# Patient Record
Sex: FEMALE | Race: WHITE | Employment: UNEMPLOYED | URBAN - METROPOLITAN AREA
[De-identification: names, ages, dates, MRNs, and addresses within clinical notes are randomized per-mention and may not be internally consistent; named-entity substitution may affect disease eponyms.]

---

## 2018-12-06 ENCOUNTER — OFFICE VISIT (OUTPATIENT)
Dept: FAMILY MEDICINE CLINIC | Age: 48
End: 2018-12-06

## 2018-12-06 ENCOUNTER — HOSPITAL ENCOUNTER (OUTPATIENT)
Dept: LAB | Age: 48
Discharge: HOME | End: 2018-12-06
Payer: MEDICAID

## 2018-12-06 VITALS
HEART RATE: 100 BPM | WEIGHT: 196.4 LBS | HEIGHT: 66 IN | TEMPERATURE: 97.3 F | DIASTOLIC BLOOD PRESSURE: 81 MMHG | RESPIRATION RATE: 16 BRPM | BODY MASS INDEX: 31.57 KG/M2 | SYSTOLIC BLOOD PRESSURE: 130 MMHG | OXYGEN SATURATION: 96 %

## 2018-12-06 DIAGNOSIS — R20.2 NUMBNESS AND TINGLING OF BOTH LOWER EXTREMITIES: ICD-10-CM

## 2018-12-06 DIAGNOSIS — R20.0 NUMBNESS AND TINGLING OF BOTH LOWER EXTREMITIES: ICD-10-CM

## 2018-12-06 DIAGNOSIS — Z76.89 ENCOUNTER TO ESTABLISH CARE: Primary | ICD-10-CM

## 2018-12-06 DIAGNOSIS — R20.0 NUMBNESS AND TINGLING OF BOTH UPPER EXTREMITIES: ICD-10-CM

## 2018-12-06 DIAGNOSIS — Z72.0 TOBACCO USE: ICD-10-CM

## 2018-12-06 DIAGNOSIS — K21.9 GASTROESOPHAGEAL REFLUX DISEASE, ESOPHAGITIS PRESENCE NOT SPECIFIED: ICD-10-CM

## 2018-12-06 DIAGNOSIS — E03.9 ACQUIRED HYPOTHYROIDISM: ICD-10-CM

## 2018-12-06 DIAGNOSIS — K52.832 LYMPHOCYTIC COLITIS: ICD-10-CM

## 2018-12-06 DIAGNOSIS — Z12.11 COLON CANCER SCREENING: ICD-10-CM

## 2018-12-06 DIAGNOSIS — F32.1 CURRENT MODERATE EPISODE OF MAJOR DEPRESSIVE DISORDER, UNSPECIFIED WHETHER RECURRENT (HCC): ICD-10-CM

## 2018-12-06 DIAGNOSIS — L65.9 HAIR LOSS: ICD-10-CM

## 2018-12-06 DIAGNOSIS — M79.7 FIBROMYALGIA: ICD-10-CM

## 2018-12-06 DIAGNOSIS — E78.5 HYPERLIPIDEMIA, UNSPECIFIED HYPERLIPIDEMIA TYPE: ICD-10-CM

## 2018-12-06 DIAGNOSIS — R20.2 NUMBNESS AND TINGLING OF BOTH UPPER EXTREMITIES: ICD-10-CM

## 2018-12-06 DIAGNOSIS — E66.9 OBESITY (BMI 30-39.9): ICD-10-CM

## 2018-12-06 DIAGNOSIS — Z23 ENCOUNTER FOR IMMUNIZATION: ICD-10-CM

## 2018-12-06 LAB
ALBUMIN SERPL-MCNC: 4.1 G/DL (ref 3.4–5)
ALBUMIN/GLOB SERPL: 1.3 {RATIO} (ref 0.8–1.7)
ALP SERPL-CCNC: 88 U/L (ref 45–117)
ALT SERPL-CCNC: 47 U/L (ref 13–56)
ANION GAP SERPL CALC-SCNC: 5 MMOL/L (ref 3–18)
AST SERPL-CCNC: 23 U/L (ref 15–37)
BASOPHILS # BLD: 0 K/UL (ref 0–0.1)
BASOPHILS NFR BLD: 1 % (ref 0–2)
BILIRUB SERPL-MCNC: 0.2 MG/DL (ref 0.2–1)
BUN SERPL-MCNC: 13 MG/DL (ref 7–18)
BUN/CREAT SERPL: 15 (ref 12–20)
CALCIUM SERPL-MCNC: 8.8 MG/DL (ref 8.5–10.1)
CHLORIDE SERPL-SCNC: 103 MMOL/L (ref 100–108)
CHOLEST SERPL-MCNC: 210 MG/DL
CO2 SERPL-SCNC: 29 MMOL/L (ref 21–32)
CREAT SERPL-MCNC: 0.88 MG/DL (ref 0.6–1.3)
DIFFERENTIAL METHOD BLD: ABNORMAL
EOSINOPHIL # BLD: 0.3 K/UL (ref 0–0.4)
EOSINOPHIL NFR BLD: 4 % (ref 0–5)
ERYTHROCYTE [DISTWIDTH] IN BLOOD BY AUTOMATED COUNT: 13.4 % (ref 11.6–14.5)
EST. AVERAGE GLUCOSE BLD GHB EST-MCNC: 114 MG/DL
GLOBULIN SER CALC-MCNC: 3.2 G/DL (ref 2–4)
GLUCOSE SERPL-MCNC: 101 MG/DL (ref 74–99)
HBA1C MFR BLD: 5.6 % (ref 4.2–5.6)
HCT VFR BLD AUTO: 45.5 % (ref 35–45)
HDLC SERPL-MCNC: 60 MG/DL (ref 40–60)
HDLC SERPL: 3.5 {RATIO} (ref 0–5)
HGB BLD-MCNC: 15 G/DL (ref 12–16)
LDLC SERPL CALC-MCNC: 126.4 MG/DL (ref 0–100)
LIPID PROFILE,FLP: ABNORMAL
LYMPHOCYTES # BLD: 2.5 K/UL (ref 0.9–3.6)
LYMPHOCYTES NFR BLD: 33 % (ref 21–52)
MCH RBC QN AUTO: 32.4 PG (ref 24–34)
MCHC RBC AUTO-ENTMCNC: 33 G/DL (ref 31–37)
MCV RBC AUTO: 98.3 FL (ref 74–97)
MONOCYTES # BLD: 0.3 K/UL (ref 0.05–1.2)
MONOCYTES NFR BLD: 4 % (ref 3–10)
NEUTS SEG # BLD: 4.5 K/UL (ref 1.8–8)
NEUTS SEG NFR BLD: 58 % (ref 40–73)
PLATELET # BLD AUTO: 272 K/UL (ref 135–420)
PMV BLD AUTO: 10.2 FL (ref 9.2–11.8)
POTASSIUM SERPL-SCNC: 4.7 MMOL/L (ref 3.5–5.5)
PROT SERPL-MCNC: 7.3 G/DL (ref 6.4–8.2)
RBC # BLD AUTO: 4.63 M/UL (ref 4.2–5.3)
SODIUM SERPL-SCNC: 137 MMOL/L (ref 136–145)
T4 FREE SERPL-MCNC: 0.8 NG/DL (ref 0.7–1.5)
TRIGL SERPL-MCNC: 118 MG/DL (ref ?–150)
TSH SERPL DL<=0.05 MIU/L-ACNC: 3.5 UIU/ML (ref 0.36–3.74)
VLDLC SERPL CALC-MCNC: 23.6 MG/DL
WBC # BLD AUTO: 7.7 K/UL (ref 4.6–13.2)

## 2018-12-06 PROCEDURE — 80053 COMPREHEN METABOLIC PANEL: CPT

## 2018-12-06 PROCEDURE — 85025 COMPLETE CBC W/AUTO DIFF WBC: CPT

## 2018-12-06 PROCEDURE — 84439 ASSAY OF FREE THYROXINE: CPT

## 2018-12-06 PROCEDURE — 84443 ASSAY THYROID STIM HORMONE: CPT

## 2018-12-06 PROCEDURE — 36415 COLL VENOUS BLD VENIPUNCTURE: CPT

## 2018-12-06 PROCEDURE — 80061 LIPID PANEL: CPT

## 2018-12-06 PROCEDURE — 83036 HEMOGLOBIN GLYCOSYLATED A1C: CPT

## 2018-12-06 RX ORDER — GABAPENTIN 300 MG/1
300 CAPSULE ORAL 3 TIMES DAILY
COMMUNITY
End: 2021-03-02 | Stop reason: DRUGHIGH

## 2018-12-06 RX ORDER — LEVOTHYROXINE SODIUM 112 UG/1
TABLET ORAL
COMMUNITY
End: 2020-05-12 | Stop reason: SDUPTHER

## 2018-12-06 RX ORDER — DULOXETIN HYDROCHLORIDE 60 MG/1
60 CAPSULE, DELAYED RELEASE ORAL DAILY
COMMUNITY

## 2018-12-06 RX ORDER — RANITIDINE 150 MG/1
150 TABLET, FILM COATED ORAL 2 TIMES DAILY
Qty: 180 TAB | Refills: 1 | Status: SHIPPED | OUTPATIENT
Start: 2018-12-06 | End: 2019-10-06 | Stop reason: SDUPTHER

## 2018-12-06 RX ORDER — ALPRAZOLAM 0.5 MG/1
2 TABLET ORAL
COMMUNITY
End: 2021-09-13 | Stop reason: DRUGHIGH

## 2018-12-06 RX ORDER — ARIPIPRAZOLE 10 MG/1
10 TABLET ORAL DAILY
COMMUNITY
End: 2022-04-26 | Stop reason: SINTOL

## 2018-12-06 NOTE — PROGRESS NOTES
HISTORY OF PRESENT ILLNESS  Amarjit Brito is a 46 y.o. female. Establish Care   The history is provided by the patient. Associated symptoms include shortness of breath. Pertinent negatives include no chest pain, no abdominal pain and no headaches. Past Medical History:   Diagnosis Date    Breast cyst     Depression     Emphysema lung ([address]     1 year and a half it was mentioned by ER Doc.  Fibromyalgia     Ovarian cyst        Past Surgical History:   Procedure Laterality Date    BREAST SURGERY PROCEDURE UNLISTED      HX APPENDECTOMY      HX GYN      Cyst, Uterine Ablation       Family History   Problem Relation Age of Onset    Heart Disease Mother     Heart Disease Father     Heart Disease Sister     MS Sister     Heart Disease Brother         3 heart attacks    Cancer Paternal Aunt         breast    Diabetes Neg Hx     Hypertension Neg Hx        Allergies   Allergen Reactions    Bactrim [Sulfamethoprim] Other (comments)     Thrush       Social History     Tobacco Use   Smoking Status Current Every Day Smoker    Packs/day: 0.50   Smokeless Tobacco Never Used     Social History     Substance and Sexual Activity   Alcohol Use Yes    Comment: [facility]     Health Maintenance Review:  Colonoscopy - ~10 years ago ? Lymphocytic colitis due, declines referral for colonoscopy, accepts FIT  Mammogram - within the past year? Pap Smear - over due    Immunizations:  Tetanus - due  Influenza - declines    Patient Active Problem List   Diagnosis Code    Current moderate episode of major depressive disorder (HCC) F32.1    Hyperlipidemia E78.5    Acquired hypothyroidism E03.9    Fibromyalgia M79.7    Obesity (BMI 30-39. 9) E66.9    Tobacco use Z72.0       Current Outpatient Medications:     DULoxetine (CYMBALTA) 60 mg capsule, Take 60 mg by mouth daily. , Disp: , Rfl:     gabapentin (NEURONTIN) 300 mg capsule, Take 300 mg by mouth three (3) times daily. , Disp: , Rfl:     ARIPiprazole (ABILIFY) 10 mg tablet, Take 10 mg by mouth daily. , Disp: , Rfl:     levothyroxine (SYNTHROID) 112 mcg tablet, Take  by mouth Daily (before breakfast). , Disp: , Rfl:     atorvastatin calcium (LIPITOR PO), Take  by mouth., Disp: , Rfl:     ALPRAZolam (XANAX) 0.5 mg tablet, Take  by mouth., Disp: , Rfl:       Review of Systems   Constitutional: Negative for chills, fever and weight loss. HENT: Negative for hearing loss. Eyes: Negative for blurred vision and double vision. Needs glasses   Respiratory: Positive for cough, shortness of breath and wheezing. Cardiovascular: Negative for chest pain, palpitations and leg swelling. Gastrointestinal: Positive for heartburn (quite often - takes TUMs). Negative for abdominal pain, blood in stool, constipation, diarrhea, melena, nausea and vomiting. Genitourinary: Negative for dysuria and urgency. Past uterine ablation   Musculoskeletal: Positive for myalgias (diffuse-fibromyalgia). Negative for joint pain. Skin: Negative for itching and rash. Diffuse hair loss   Neurological: Positive for dizziness (blance issue) and sensory change (ulnar hand numbness bilaterally-numb patch on the back of scalp, also numbness in feet-chronic). Negative for tingling, focal weakness and headaches. Endo/Heme/Allergies: Negative for environmental allergies. Psychiatric/Behavioral: Positive for depression. Negative for suicidal ideas. The patient is nervous/anxious. The patient does not have insomnia. Also reports PTSD, followed by psychiatry     Visit Vitals  /81 (BP 1 Location: Right arm, BP Patient Position: Sitting)   Pulse 100   Temp 97.3 °F (36.3 °C) (Oral)   Resp 16   Ht 5' 5.5\" (1.664 m)   Wt 196 lb 6.4 oz (89.1 kg)   SpO2 96%   BMI 32.19 kg/m²       Physical Exam   Constitutional: She is oriented to person, place, and time. She appears well-developed and well-nourished. HENT:   Head: Normocephalic.    Right Ear: Tympanic membrane and ear canal normal.   Left Ear: Tympanic membrane and ear canal normal.   Mouth/Throat: Oropharynx is clear and moist.   Eyes: Conjunctivae and EOM are normal. Pupils are equal, round, and reactive to light. Neck: Neck supple. Cardiovascular: Normal rate, regular rhythm, normal heart sounds and intact distal pulses. Pulmonary/Chest: Effort normal and breath sounds normal.   Abdominal: Soft. Bowel sounds are normal. She exhibits no mass. There is no tenderness. Musculoskeletal: She exhibits no edema. Neurological: She is alert and oriented to person, place, and time. She has normal reflexes. Skin: Skin is warm and dry. Psychiatric: She has a normal mood and affect. Her behavior is normal.   Nursing note and vitals reviewed. ASSESSMENT and PLAN    ICD-10-CM ICD-9-CM    1. Encounter to establish care Z76.89 V65.8    2. Current moderate episode of major depressive disorder, unspecified whether recurrent (HCC) F32.1 296.22    3. Hyperlipidemia, unspecified hyperlipidemia type E78.5 272.4 LIPID PANEL   4. Acquired hypothyroidism E03.9 244.9 TSH 3RD GENERATION      T4, FREE   5. Fibromyalgia M79.7 729.1    6. Obesity (BMI 30-39. 9) E66.9 278.00    7. Tobacco use Z72.0 305.1    8. Gastroesophageal reflux disease, esophagitis presence not specified K21.9 530.81 raNITIdine (ZANTAC) 150 mg tablet   9. Lymphocytic colitis K52.832 558.9    10. Hair loss L65.9 704.00 CBC WITH AUTOMATED DIFF   11. Numbness and tingling of both lower extremities R20.0 782.0 HEMOGLOBIN A1C WITH EAG    R36.6  METABOLIC PANEL, COMPREHENSIVE   12. Numbness and tingling of both upper extremities R20.0 782.0 HEMOGLOBIN A1C WITH EAG    T81.0  METABOLIC PANEL, COMPREHENSIVE   13. Encounter for immunization Z23 V03.89 TETANUS, DIPHTHERIA TOXOIDS AND ACELLULAR PERTUSSIS VACCINE (TDAP), IN INDIVIDS. >=7, IM   14. Colon cancer screening Z12.11 V76.51 OCCULT BLOOD IMMUNOASSAY,DIAGNOSTIC   Further disposition pending lab results if indicated.   Continue current medications. Take ranitidine (Zantac) 150 mg twice a day before breakfast and at bedtime. Avoid citrus, dairy, caffeine, tomato, alcohol and NSAIDs. Do not eat within 2-3 hours  of bedtime. Schedule well woman evaluation with Dr. Jatin Aragon. The patient was counseled on the dangers of tobacco use, and was advised to quit. Reviewed strategies to maximize success, including written materials. Avoid dietary starch and sugar and follow a program of regular aerobic exercise. Verbal and written recommendations provided. Return for follow up in 6 months, sooner with any problems.

## 2018-12-06 NOTE — PATIENT INSTRUCTIONS
Further disposition pending lab results if indicated. Continue current medications. Take ranitidine (Zantac) 150 mg twice a day before breakfast and at bedtime. Avoid citrus, dairy, caffeine, tomato, alcohol and NSAIDs. Do not eat within 2-3 hours  of bedtime. Schedule well woman evaluation with Dr. Adele Copeland. The patient was counseled on the dangers of tobacco use, and was advised to quit. Reviewed strategies to maximize success, including written materials. Avoid dietary starch and sugar and follow a program of regular aerobic exercise. Verbal and written recommendations provided. Return for follow up in 6 months, sooner with any problems. Well Visit, Women 48 to 72: Care Instructions  Your Care Instructions    Physical exams can help you stay healthy. Your doctor has checked your overall health and may have suggested ways to take good care of yourself. He or she also may have recommended tests. At home, you can help prevent illness with healthy eating, regular exercise, and other steps. Follow-up care is a key part of your treatment and safety. Be sure to make and go to all appointments, and call your doctor if you are having problems. It's also a good idea to know your test results and keep a list of the medicines you take. How can you care for yourself at home? · Reach and stay at a healthy weight. This will lower your risk for many problems, such as obesity, diabetes, heart disease, and high blood pressure. · Get at least 30 minutes of exercise on most days of the week. Walking is a good choice. You also may want to do other activities, such as running, swimming, cycling, or playing tennis or team sports. · Do not smoke. Smoking can make health problems worse. If you need help quitting, talk to your doctor about stop-smoking programs and medicines. These can increase your chances of quitting for good. · Protect your skin from too much sun.  When you're outdoors from 10 a.m. to 4 p.m., stay in the shade or cover up with clothing and a hat with a wide brim. Wear sunglasses that block UV rays. Even when it's cloudy, put broad-spectrum sunscreen (SPF 30 or higher) on any exposed skin. · See a dentist one or two times a year for checkups and to have your teeth cleaned. · Wear a seat belt in the car. · Limit alcohol to 1 drink a day. Too much alcohol can cause health problems. Follow your doctor's advice about when to have certain tests. These tests can spot problems early. · Cholesterol. Your doctor will tell you how often to have this done based on your age, family history, or other things that can increase your risk for heart attack and stroke. · Blood pressure. Have your blood pressure checked during a routine doctor visit. Your doctor will tell you how often to check your blood pressure based on your age, your blood pressure results, and other factors. · Mammogram. Ask your doctor how often you should have a mammogram, which is an X-ray of your breasts. A mammogram can spot breast cancer before it can be felt and when it is easiest to treat. · Pap test and pelvic exam. Ask your doctor how often you should have a Pap test. You may not need to have a Pap test as often as you used to. · Vision. Have your eyes checked every year or two or as often as your doctor suggests. Some experts recommend that you have yearly exams for glaucoma and other age-related eye problems starting at age 48. · Hearing. Tell your doctor if you notice any change in your hearing. You can have tests to find out how well you hear. · Diabetes. Ask your doctor whether you should have tests for diabetes. · Colon cancer. You should begin tests for colon cancer at age 48. You may have one of several tests. Your doctor will tell you how often to have tests based on your age and risk.  Risks include whether you already had a precancerous polyp removed from your colon or whether your parents, sisters and brothers, or children have had colon cancer. · Thyroid disease. Talk to your doctor about whether to have your thyroid checked as part of a regular physical exam. Women have an increased chance of a thyroid problem. · Osteoporosis. You should begin tests for bone density at age 72. If you are younger than 72, ask your doctor whether you have factors that may increase your risk for this disease. You may want to have this test before age 72. · Heart attack and stroke risk. At least every 4 to 6 years, you should have your risk for heart attack and stroke assessed. Your doctor uses factors such as your age, blood pressure, cholesterol, and whether you smoke or have diabetes to show what your risk for a heart attack or stroke is over the next 10 years. When should you call for help? Watch closely for changes in your health, and be sure to contact your doctor if you have any problems or symptoms that concern you. Where can you learn more? Go to http://ricardo-rashida.info/. Enter U022 in the search box to learn more about \"Well Visit, Women 50 to 72: Care Instructions. \"  Current as of: March 29, 2018  Content Version: 11.8  © 5774-3648 [employer]ppy. Care instructions adapted under license by [employer] (which disclaims liability or warranty for this information). If you have questions about a medical condition or this instruction, always ask your healthcare professional. [place] [address] any warranty or liability for your use of this information. Stopping Smoking: Care Instructions  Your Care Instructions  Cigarette smokers crave the nicotine in cigarettes. Giving it up is much harder than simply changing a habit. Your body has to stop craving the nicotine. It is hard to quit, but you can do it. There are many tools that people use to quit smoking. You may find that combining tools works best for you. There are several steps to quitting.  First you get ready to quit. Then you get support to help you. After that, you learn new skills and behaviors to become a nonsmoker. For many people, a necessary step is getting and using medicine. Your doctor will help you set up the plan that best meets your needs. You may want to attend a smoking cessation program to help you quit smoking. When you choose a program, look for one that has proven success. Ask your doctor for ideas. You will greatly increase your chances of success if you take medicine as well as get counseling or join a cessation program.  Some of the changes you feel when you first quit tobacco are uncomfortable. Your body will miss the nicotine at first, and you may feel short-tempered and grumpy. You may have trouble sleeping or concentrating. Medicine can help you deal with these symptoms. You may struggle with changing your smoking habits and rituals. The last step is the tricky one: Be prepared for the smoking urge to continue for a time. This is a lot to deal with, but keep at it. You will feel better. Follow-up care is a key part of your treatment and safety. Be sure to make and go to all appointments, and call your doctor if you are having problems. It's also a good idea to know your test results and keep a list of the medicines you take. How can you care for yourself at home? · Ask your family, friends, and coworkers for support. You have a better chance of quitting if you have help and support. · Join a support group, such as Nicotine Anonymous, for people who are trying to quit smoking. · Consider signing up for a smoking cessation program, such as the American Lung Association's Freedom from Smoking program.  · Get text messaging support. Go to the website at www.smokefree. gov to sign up for the [facility] program.  · Set a quit date. Pick your date carefully so that it is not right in the middle of a big deadline or stressful time. Once you quit, do not even take a puff.  Get rid of all ashtrays and lighters after your last cigarette. Clean your house and your clothes so that they do not smell of smoke. · Learn how to be a nonsmoker. Think about ways you can avoid those things that make you reach for a cigarette. ? Avoid situations that put you at greatest risk for smoking. For some people, it is hard to have a drink with friends without smoking. For others, they might skip a coffee break with coworkers who smoke. ? Change your daily routine. Take a different route to work or eat a meal in a different place. · Cut down on stress. Calm yourself or release tension by doing an activity you enjoy, such as reading a book, taking a hot bath, or gardening. · Talk to your doctor or pharmacist about nicotine replacement therapy, which replaces the nicotine in your body. You still get nicotine but you do not use tobacco. Nicotine replacement products help you slowly reduce the amount of nicotine you need. These products come in several forms, many of them available over-the-counter:  ? Nicotine patches  ? Nicotine gum and lozenges  ? Nicotine inhaler  · Ask your doctor about bupropion (Wellbutrin) or varenicline (Chantix), which are prescription medicines. They do not contain nicotine. They help you by reducing withdrawal symptoms, such as stress and anxiety. · Some people find hypnosis, acupuncture, and massage helpful for ending the smoking habit. · Eat a healthy diet and get regular exercise. Having healthy habits will help your body move past its craving for nicotine. · Be prepared to keep trying. Most people are not successful the first few times they try to quit. Do not get mad at yourself if you smoke again. Make a list of things you learned and think about when you want to try again, such as next week, next month, or next year. Where can you learn more? Go to http://ricardo-rashida.info/. Enter S143 in the search box to learn more about \"Stopping Smoking: Care Instructions. \"  Current as of: November 29, 2017  Content Version: 11.8  © 6253-6598 [employer]. Care instructions adapted under license by [employer] (which disclaims liability or warranty for this information). If you have questions about a medical condition or this instruction, always ask your healthcare professional. [address] any warranty or liability for your use of this information. Starting a Weight Loss Plan: Care Instructions  Your Care Instructions    If you are thinking about losing weight, it can be hard to know where to start. Your doctor can help you set up a weight loss plan that best meets your needs. You may want to take a class on nutrition or exercise, or join a weight loss support group. If you have questions about how to make changes to your eating or exercise habits, ask your doctor about seeing a registered dietitian or an exercise specialist.  It can be a big challenge to lose weight. But you do not have to make huge changes at once. Make small changes, and stick with them. When those changes become habit, add a few more changes. If you do not think you are ready to make changes right now, try to pick a date in the future. Make an appointment to see your doctor to discuss whether the time is right for you to start a plan. Follow-up care is a key part of your treatment and safety. Be sure to make and go to all appointments, and call your doctor if you are having problems. It's also a good idea to know your test results and keep a list of the medicines you take. How can you care for yourself at home? · Set realistic goals. Many people expect to lose much more weight than is likely. A weight loss of 5% to 10% of your body weight may be enough to improve your health. · Get family and friends involved to provide support. Talk to them about why you are trying to lose weight, and ask them to help.  They can help by participating in exercise and having meals with you, even if they may be eating something different. · Find what works best for you. If you do not have time or do not like to cook, a program that offers meal replacement bars or shakes may be better for you. Or if you like to prepare meals, finding a plan that includes daily menus and recipes may be best.  · Ask your doctor about other health professionals who can help you achieve your weight loss goals. ? A dietitian can help you make healthy changes in your diet. ? An exercise specialist or  can help you develop a safe and effective exercise program.  ? A counselor or psychiatrist can help you cope with issues such as depression, anxiety, or family problems that can make it hard to focus on weight loss. · Consider joining a support group for people who are trying to lose weight. Your doctor can suggest groups in your area. Where can you learn more? Go to http://ricardoNudgerashida.info/. Enter Z611 in the search box to learn more about \"Starting a Weight Loss Plan: Care Instructions. \"  Current as of: June 26, 2018  Content Version: 11.8  © 1187-5719 [employer]. Care instructions adapted under license by [employer] (which disclaims liability or warranty for this information). If you have questions about a medical condition or this instruction, always ask your healthcare professional. [address] any warranty or liability for your use of this information. Learning About Low-Carbohydrate Diets for Weight Loss  What is a low-carbohydrate diet? Low-carb diets avoid foods that are high in carbohydrate. These high-carb foods include pasta, bread, rice, cereal, fruits, and starchy vegetables. Instead, these diets usually have you eat foods that are high in fat and protein. Many people lose weight quickly on a low-carb diet. But the early weight loss is water.  People on this diet often gain the weight back after they start eating carbs again. Not all diet plans are safe or work well. A lot of the evidence shows that low-carb diets aren't healthy. That's because these diets often don't include healthy foods like fruits and vegetables. Losing weight safely means balancing protein, fat, and carbs with every meal and snack. And low-carb diets don't always provide the vitamins, minerals, and fiber you need. If you have a serious medical condition, talk to your doctor before you try any diet. These conditions include kidney disease, heart disease, type 2 diabetes, high cholesterol, and high blood pressure. If you are pregnant, it may not be safe for your baby if you are on a low-carb diet. How can you lose weight safely? You might have heard that a diet plan helped another person lose weight. But that doesn't mean that it will work for you. It is very hard to stay on a diet that includes lots of big changes in your eating habits. If you want to get to a healthy weight and stay there, making healthy lifestyle changes will often work better than dieting. These steps can help. · Make a plan for change. Work with your doctor to create a plan that is right for you. · See a dietitian. He or she can show you how to make healthy changes in your eating habits. · Manage stress. If you have a lot of stress in your life, it can be hard to focus on making healthy changes to your daily habits. · Track your food and activity. You are likely to do better at losing weight if you keep track of what you eat and what you do. Follow-up care is a key part of your treatment and safety. Be sure to make and go to all appointments, and call your doctor if you are having problems. It's also a good idea to know your test results and keep a list of the medicines you take. Where can you learn more? Go to http://ricardo-rashida.info/.   Enter [id number] in the search box to learn more about \"Learning About Low-Carbohydrate Diets for Weight Loss.\"  Current as of: March 29, 2018  Content Version: 11.8  © 7535-5395 [employer]. Care instructions adapted under license by [employer] (which disclaims liability or warranty for this information). If you have questions about a medical condition or this instruction, always ask your healthcare professional. [address] any warranty or liability for your use of this information. Gastroesophageal Reflux Disease (GERD): Care Instructions  Your Care Instructions    Gastroesophageal reflux disease (GERD) is the backward flow of stomach acid into the esophagus. The esophagus is the tube that leads from your throat to your stomach. A one-way valve prevents the stomach acid from moving up into this tube. When you have GERD, this valve does not close tightly enough. If you have mild GERD symptoms including heartburn, you may be able to control the problem with antacids or over-the-counter medicine. Changing your diet, losing weight, and making other lifestyle changes can also help reduce symptoms. Follow-up care is a key part of your treatment and safety. Be sure to make and go to all appointments, and call your doctor if you are having problems. It's also a good idea to know your test results and keep a list of the medicines you take. How can you care for yourself at home? · Take your medicines exactly as prescribed. Call your doctor if you think you are having a problem with your medicine. · Your doctor may recommend over-the-counter medicine. For mild or occasional indigestion, antacids, such as Tums, Gaviscon, Mylanta, or Maalox, may help. Your doctor also may recommend over-the-counter acid reducers, such as Pepcid AC, Tagamet HB, Zantac 75, or Prilosec. Read and follow all instructions on the label. If you use these medicines often, talk with your doctor. · Change your eating habits.   ? It's best to eat several small meals instead of two or three large meals.  ? After you eat, wait 2 to 3 hours before you lie down. ? Chocolate, mint, and alcohol can make GERD worse. ? Spicy foods, foods that have a lot of acid (like tomatoes and oranges), and coffee can make GERD symptoms worse in some people. If your symptoms are worse after you eat a certain food, you may want to stop eating that food to see if your symptoms get better. · Do not smoke or chew tobacco. Smoking can make GERD worse. If you need help quitting, talk to your doctor about stop-smoking programs and medicines. These can increase your chances of quitting for good. · If you have GERD symptoms at night, raise the head of your bed 6 to 8 inches by putting the frame on blocks or placing a foam wedge under the head of your mattress. (Adding extra pillows does not work.)  · Do not wear tight clothing around your middle. · Lose weight if you need to. Losing just 5 to 10 pounds can help. When should you call for help? Call your doctor now or seek immediate medical care if:    · You have new or different belly pain.     · Your stools are black and tarlike or have streaks of blood.    Watch closely for changes in your health, and be sure to contact your doctor if:    · Your symptoms have not improved after 2 days.     · Food seems to catch in your throat or chest.   Where can you learn more? Go to http://ricardo-rashida.info/. Enter D275 in the search box to learn more about \"Gastroesophageal Reflux Disease (GERD): Care Instructions. \"  Current as of: March 28, 2018  Content Version: 11.8  © 8907-9320 MovieLaLa. Care instructions adapted under license by [employer] (which disclaims liability or warranty for this information). If you have questions about a medical condition or this instruction, always ask your healthcare professional. [address] any warranty or liability for your use of this information.          Fecal Immunochemical Test (FIT): About This Test  What is it? A fecal immunochemical test, or FIT, checks for hidden blood in the stool. Your doctor gives you a kit that contains everything you need. At home, you follow simple steps to collect a small amount of stool. You return the kit to the doctor or to a lab. Why is this test done? This test is done to check for colorectal cancer and other types of gastrointestinal problems, such as hemorrhoids, anal fissures, and colon polyps, that can cause blood in the stools. How can you prepare for the test?  · Don't do the test during your menstrual period or if you are having bleeding from hemorrhoids. What happens during the test?  There are different types of home tests available. It is important to follow the instructions provided with any test.  Here are some general instructions:  · Check the expiration date on the package. Don't use a test kit after its expiration date. · Follow the instructions exactly. Do all the steps, in order, without skipping any of them. · After you finish your test, follow the instructions that you were given for returning the test.  There is a FIT test that shows the results right away. If your test shows that blood was found in your stool sample, call your doctor as soon as possible. Follow-up care is a key part of your treatment and safety. Be sure to make and go to all appointments, and call your doctor if you are having problems. It's also a good idea to keep a list of the medicines you take. Ask your doctor when you can expect to have your test results. Where can you learn more? Go to http://ricardo-rashida.info/. Enter M950 in the search box to learn more about \"Fecal Immunochemical Test (FIT): About This Test.\"  Current as of: March 28, 2018  Content Version: 11.8  © 9320-9336 [employer]whip. Care instructions adapted under license by [employer] (which disclaims liability or warranty for this information).  If you have questions about a medical condition or this instruction, always ask your healthcare professional. [place] [address] any warranty or liability for your use of this information.

## 2018-12-06 NOTE — PROGRESS NOTES
Matilde Delgado is a 46 y.o. female (: 1970) presenting to address:    Chief Complaint   Patient presents with   [facility].     Here to establish care. pt declined flu vaccine. There were no vitals filed for this visit. Hearing/Vision:   No exam data present    Learning Assessment:     Learning Assessment 2018   PRIMARY LEARNER Patient   HIGHEST LEVEL OF EDUCATION - PRIMARY LEARNER  DID NOT GRADUATE HIGH SCHOOL   BARRIERS PRIMARY LEARNER NONE   CO-LEARNER CAREGIVER No   PRIMARY LANGUAGE ENGLISH   LEARNER PREFERENCE PRIMARY LISTENING   ANSWERED BY patient   RELATIONSHIP SELF     Depression Screening:     PHQ over the last two weeks 2018   Little interest or pleasure in doing things Several days   Feeling down, depressed, irritable, or hopeless Several days   Total Score PHQ 2 2     Fall Risk Assessment:   No flowsheet data found. Abuse Screening:   No flowsheet data found. Coordination of Care Questionaire:   1. Have you been to the ER, urgent care clinic since your last visit? Hospitalized since your last visit? NO    2. Have you seen or consulted any other health care providers outside of the [address] [place] [address] since your last visit? Include any pap smears or colon screening. NO    Advanced Directive:   1. Do you have an Advanced Directive? NO    2. Would you like information on Advanced Directives?  NO

## 2018-12-16 ENCOUNTER — HOSPITAL ENCOUNTER (OUTPATIENT)
Dept: LAB | Age: 48
Discharge: HOME | End: 2018-12-16
Payer: MEDICAID

## 2018-12-16 DIAGNOSIS — Z12.11 COLON CANCER SCREENING: ICD-10-CM

## 2018-12-16 PROCEDURE — 82274 ASSAY TEST FOR BLOOD FECAL: CPT

## 2018-12-28 LAB — HEMOCCULT STL QL IA: NEGATIVE

## 2019-03-27 ENCOUNTER — OFFICE VISIT (OUTPATIENT)
Dept: FAMILY MEDICINE CLINIC | Age: 49
End: 2019-03-27

## 2019-03-27 ENCOUNTER — TELEPHONE (OUTPATIENT)
Dept: FAMILY MEDICINE CLINIC | Age: 49
End: 2019-03-27

## 2019-03-27 VITALS
WEIGHT: 206 LBS | HEART RATE: 95 BPM | DIASTOLIC BLOOD PRESSURE: 73 MMHG | RESPIRATION RATE: 22 BRPM | TEMPERATURE: 96.4 F | HEIGHT: 66 IN | BODY MASS INDEX: 33.11 KG/M2 | SYSTOLIC BLOOD PRESSURE: 132 MMHG | OXYGEN SATURATION: 99 %

## 2019-03-27 DIAGNOSIS — Z86.39 HISTORY OF VITAMIN D DEFICIENCY: Primary | ICD-10-CM

## 2019-03-27 DIAGNOSIS — E66.9 OBESITY (BMI 30-39.9): ICD-10-CM

## 2019-03-27 DIAGNOSIS — Z72.0 TOBACCO USE: ICD-10-CM

## 2019-03-27 DIAGNOSIS — J43.2 CENTRILOBULAR EMPHYSEMA (HCC): Primary | ICD-10-CM

## 2019-03-27 RX ORDER — AMITRIPTYLINE HYDROCHLORIDE 100 MG/1
TABLET, FILM COATED ORAL
COMMUNITY
End: 2020-05-12

## 2019-03-27 RX ORDER — FLUTICASONE PROPIONATE 110 UG/1
2 AEROSOL, METERED RESPIRATORY (INHALATION) EVERY 12 HOURS
Qty: 1 INHALER | Refills: 11 | Status: SHIPPED | OUTPATIENT
Start: 2019-03-27 | End: 2020-05-12 | Stop reason: SDUPTHER

## 2019-03-27 RX ORDER — ALBUTEROL SULFATE 90 UG/1
AEROSOL, METERED RESPIRATORY (INHALATION)
Qty: 1 INHALER | Refills: 11 | Status: SHIPPED | OUTPATIENT
Start: 2019-03-27 | End: 2020-05-12 | Stop reason: SDUPTHER

## 2019-03-27 NOTE — PROGRESS NOTES
Mally Niño is a 46 y.o. female (: 1970) presenting to address:    Chief Complaint   Patient presents with    Weight Gain    Cough     patient would like to discuss emphysema       Vitals:    19 1032   BP: 132/73   Pulse: 95   Resp: 22   Temp: 96.4 °F (35.8 °C)   TempSrc: Oral   SpO2: 99%   Weight: 206 lb (93.4 kg)   Height: 5' 5.5\" (1.664 m)       Hearing/Vision:   No exam data present    Learning Assessment:     Learning Assessment 2018   PRIMARY LEARNER Patient   HIGHEST LEVEL OF EDUCATION - PRIMARY LEARNER  DID NOT GRADUATE HIGH SCHOOL   BARRIERS PRIMARY LEARNER NONE   CO-LEARNER CAREGIVER No   PRIMARY LANGUAGE ENGLISH   LEARNER PREFERENCE PRIMARY LISTENING   ANSWERED BY patient   RELATIONSHIP SELF     Depression Screening:     3 most recent PHQ Screens 2018   Little interest or pleasure in doing things Several days   Feeling down, depressed, irritable, or hopeless Several days   Total Score PHQ 2 2     Fall Risk Assessment:   No flowsheet data found. Abuse Screening:   No flowsheet data found. Coordination of Care Questionaire:   1. Have you been to the ER, urgent care clinic since your last visit? Hospitalized since your last visit? NO    2. Have you seen or consulted any other health care providers outside of the [address] [place] [address] since your last visit? Include any pap smears or colon screening. NO    Advanced Directive:   1. Do you have an Advanced Directive? NO    2. Would you like information on Advanced Directives?  NO

## 2019-03-27 NOTE — TELEPHONE ENCOUNTER
Please advise that  is not here today, can't collect lab, but the order is in for a Vitamin D level which can be drawn anytime she can come by the office.

## 2019-03-27 NOTE — PATIENT INSTRUCTIONS
Albuterol inhaler, 2 puffs up to every 4 hours if needed for wheezing, cough, shortness of breath  Fluticasone inhaler - 2 puffs twice daily rinse mouth and throat after use. Return for follow up in 6 weeks, sooner with any problems. The patient was counseled on the dangers of tobacco use, and was advised to quit. Reviewed strategies to maximize success, including written materials. Avoid dietary starch and sugar and follow a program of regular aerobic exercise. Verbal and written recommendations provided. Chronic Obstructive Pulmonary Disease (COPD): Care Instructions  Your Care Instructions    Chronic obstructive pulmonary disease (COPD) is a general term for a group of lung diseases, including emphysema and chronic bronchitis. People with COPD have decreased airflow in and out of the lungs, which makes it hard to breathe. The airways also can get clogged with thick mucus. Cigarette smoking is a major cause of COPD. Although there is no cure for COPD, you can slow its progress. Following your treatment plan and taking care of yourself can help you feel better and live longer. Follow-up care is a key part of your treatment and safety. Be sure to make and go to all appointments, and call your doctor if you are having problems. It's also a good idea to know your test results and keep a list of the medicines you take. How can you care for yourself at home?   Staying healthy    · Do not smoke. This is the most important step you can take to prevent more damage to your lungs. If you need help quitting, talk to your doctor about stop-smoking programs and medicines. These can increase your chances of quitting for good.     · Avoid colds and flu. Get a pneumococcal vaccine shot. If you have had one before, ask your doctor whether you need a second dose. Get the flu vaccine every fall.  If you must be around people with colds or the flu, wash your hands often.     · Avoid secondhand smoke, air pollution, and high altitudes. Also avoid cold, dry air and hot, humid air. Stay at home with your windows closed when air pollution is bad.    Medicines and oxygen therapy    · Take your medicines exactly as prescribed. Call your doctor if you think you are having a problem with your medicine.     · You may be taking medicines such as:  ? Bronchodilators. These help open your airways and make breathing easier. Bronchodilators are either short-acting (work for 6 to 9 hours) or long-acting (work for 24 hours). You inhale most bronchodilators, so they start to act quickly. Always carry your quick-relief inhaler with you in case you need it while you are away from home. ? Corticosteroids (prednisone, budesonide). These reduce airway inflammation. They come in pill or inhaled form. You must take these medicines every day for them to work well.     · A spacer may help you get more inhaled medicine to your lungs. Ask your doctor or pharmacist if a spacer is right for you. If it is, ask how to use it properly.     · Do not take any vitamins, over-the-counter medicine, or herbal products without talking to your doctor first.     · If your doctor prescribed antibiotics, take them as directed. Do not stop taking them just because you feel better. You need to take the full course of antibiotics.     · Oxygen therapy boosts the amount of oxygen in your blood and helps you breathe easier. Use the flow rate your doctor has recommended, and do not change it without talking to your doctor first.   Activity    · Get regular exercise. Walking is an easy way to get exercise. Start out slowly, and walk a little more each day.     · Pay attention to your breathing.  You are exercising too hard if you cannot talk while you are exercising.     · Take short rest breaks when doing household chores and other activities.     · Learn breathing methods--such as breathing through pursed lips--to help you become less short of breath.     · If your doctor has not set you up with a pulmonary rehabilitation program, talk to him or her about whether rehab is right for you. Rehab includes exercise programs, education about your disease and how to manage it, help with diet and other changes, and emotional support. Diet    · Eat regular, healthy meals. Use bronchodilators about 1 hour before you eat to make it easier to eat. Eat several small meals instead of three large ones. Drink beverages at the end of the meal. Avoid foods that are hard to chew.     · Eat foods that contain protein so that you do not lose muscle mass.     · Talk with your doctor if you gain too much weight or if you lose weight without trying.    Mental health    · Talk to your family, friends, or a therapist about your feelings. It is normal to feel frightened, angry, hopeless, helpless, and even guilty. Talking openly about bad feelings can help you cope. If these feelings last, talk to your doctor. When should you call for help? Call 911 anytime you think you may need emergency care. For example, call if:    · You have severe trouble breathing.    Call your doctor now or seek immediate medical care if:    · You have new or worse trouble breathing.     · You cough up blood.     · You have a fever.    Watch closely for changes in your health, and be sure to contact your doctor if:    · You cough more deeply or more often, especially if you notice more mucus or a change in the color of your mucus.     · You have new or worse swelling in your legs or belly.     · You are not getting better as expected. Where can you learn more? Go to http://ricardo-rashida.info/. Stephanie Jensen in the search box to learn more about \"Chronic Obstructive Pulmonary Disease (COPD): Care Instructions. \"  Current as of: September 5, 2018  Content Version: 11.9  © 6378-1123 [employer].  Care instructions adapted under license by [employer] (which disclaims liability or warranty for this information). If you have questions about a medical condition or this instruction, always ask your healthcare professional. [address] any warranty or liability for your use of this information. Stopping Smoking: Care Instructions  Your Care Instructions  Cigarette smokers crave the nicotine in cigarettes. Giving it up is much harder than simply changing a habit. Your body has to stop craving the nicotine. It is hard to quit, but you can do it. There are many tools that people use to quit smoking. You may find that combining tools works best for you. There are several steps to quitting. First you get ready to quit. Then you get support to help you. After that, you learn new skills and behaviors to become a nonsmoker. For many people, a necessary step is getting and using medicine. Your doctor will help you set up the plan that best meets your needs. You may want to attend a smoking cessation program to help you quit smoking. When you choose a program, look for one that has proven success. Ask your doctor for ideas. You will greatly increase your chances of success if you take medicine as well as get counseling or join a cessation program.  Some of the changes you feel when you first quit tobacco are uncomfortable. Your body will miss the nicotine at first, and you may feel short-tempered and grumpy. You may have trouble sleeping or concentrating. Medicine can help you deal with these symptoms. You may struggle with changing your smoking habits and rituals. The last step is the tricky one: Be prepared for the smoking urge to continue for a time. This is a lot to deal with, but keep at it. You will feel better. Follow-up care is a key part of your treatment and safety. Be sure to make and go to all appointments, and call your doctor if you are having problems. It's also a good idea to know your test results and keep a list of the medicines you take.   How can you care for yourself at home? · Ask your family, friends, and coworkers for support. You have a better chance of quitting if you have help and support. · Join a support group, such as Nicotine Anonymous, for people who are trying to quit smoking. · Consider signing up for a smoking cessation program, such as the American Lung Association's Freedom from Smoking program.  · Get text messaging support. Go to the website at www.smokefree. gov to sign up for the [facility] program.  · Set a quit date. Pick your date carefully so that it is not right in the middle of a big deadline or stressful time. Once you quit, do not even take a puff. Get rid of all ashtrays and lighters after your last cigarette. Clean your house and your clothes so that they do not smell of smoke. · Learn how to be a nonsmoker. Think about ways you can avoid those things that make you reach for a cigarette. ? Avoid situations that put you at greatest risk for smoking. For some people, it is hard to have a drink with friends without smoking. For others, they might skip a coffee break with coworkers who smoke. ? Change your daily routine. Take a different route to work or eat a meal in a different place. · Cut down on stress. Calm yourself or release tension by doing an activity you enjoy, such as reading a book, taking a hot bath, or gardening. · Talk to your doctor or pharmacist about nicotine replacement therapy, which replaces the nicotine in your body. You still get nicotine but you do not use tobacco. Nicotine replacement products help you slowly reduce the amount of nicotine you need. These products come in several forms, many of them available over-the-counter:  ? Nicotine patches  ? Nicotine gum and lozenges  ? Nicotine inhaler  · Ask your doctor about bupropion (Wellbutrin) or varenicline (Chantix), which are prescription medicines. They do not contain nicotine. They help you by reducing withdrawal symptoms, such as stress and anxiety.   · Some people find hypnosis, acupuncture, and massage helpful for ending the smoking habit. · Eat a healthy diet and get regular exercise. Having healthy habits will help your body move past its craving for nicotine. · Be prepared to keep trying. Most people are not successful the first few times they try to quit. Do not get mad at yourself if you smoke again. Make a list of things you learned and think about when you want to try again, such as next week, next month, or next year. Where can you learn more? Go to http://ricardo-rashida.info/. Enter M594 in the search box to learn more about \"Stopping Smoking: Care Instructions. \"  Current as of: September 26, 2018  Content Version: 11.9  © 3838-4640 [employer]. Care instructions adapted under license by [employer] (which disclaims liability or warranty for this information). If you have questions about a medical condition or this instruction, always ask your healthcare professional. [place] [address] any warranty or liability for your use of this information. Starting a Weight Loss Plan: Care Instructions  Your Care Instructions    If you are thinking about losing weight, it can be hard to know where to start. Your doctor can help you set up a weight loss plan that best meets your needs. You may want to take a class on nutrition or exercise, or join a weight loss support group. If you have questions about how to make changes to your eating or exercise habits, ask your doctor about seeing a registered dietitian or an exercise specialist.  It can be a big challenge to lose weight. But you do not have to make huge changes at once. Make small changes, and stick with them. When those changes become habit, add a few more changes. If you do not think you are ready to make changes right now, try to pick a date in the future.  Make an appointment to see your doctor to discuss whether the time is right for you to start a plan. Follow-up care is a key part of your treatment and safety. Be sure to make and go to all appointments, and call your doctor if you are having problems. It's also a good idea to know your test results and keep a list of the medicines you take. How can you care for yourself at home? · Set realistic goals. Many people expect to lose much more weight than is likely. A weight loss of 5% to 10% of your body weight may be enough to improve your health. · Get family and friends involved to provide support. Talk to them about why you are trying to lose weight, and ask them to help. They can help by participating in exercise and having meals with you, even if they may be eating something different. · Find what works best for you. If you do not have time or do not like to cook, a program that offers meal replacement bars or shakes may be better for you. Or if you like to prepare meals, finding a plan that includes daily menus and recipes may be best.  · Ask your doctor about other health professionals who can help you achieve your weight loss goals. ? A dietitian can help you make healthy changes in your diet. ? An exercise specialist or  can help you develop a safe and effective exercise program.  ? A counselor or psychiatrist can help you cope with issues such as depression, anxiety, or family problems that can make it hard to focus on weight loss. · Consider joining a support group for people who are trying to lose weight. Your doctor can suggest groups in your area. Where can you learn more? Go to http://ricardo-rashida.info/. Enter E430 in the search box to learn more about \"Starting a Weight Loss Plan: Care Instructions. \"  Current as of: June 25, 2018  Content Version: 11.9  © 0307-1465 [employer]. Care instructions adapted under license by [employer] (which disclaims liability or warranty for this information).  If you have questions about a medical condition or this instruction, always ask your healthcare professional. [address] any warranty or liability for your use of this information. Learning About the Glycemic Index  What is the glycemic index? The glycemic index (GI) is a rating system for foods that contain carbohydrate. It helps you know how quickly these foods raise blood sugar. Carbohydrate raises blood sugar more quickly than other nutrients, like proteins and fats. Some carbohydrate foods raise blood sugar faster than others. · Low glycemic foods release sugar into the blood slowly. · High glycemic foods make blood sugar rise quickly. How does it work? Foods in the index are ranked by number. · High glycemic index foods are rated 70 and above. · Medium glycemic index foods are 56 to 69. · Low glycemic index foods are 55 or less. What do you need to know? Some people who have diabetes use the glycemic index to help them plan meals and manage blood sugar. · If you have diabetes, talk with your doctor, a dietitian, or a certified diabetes educator before using a low glycemic index eating plan. · Eating low glycemic foods is most helpful when used along with another eating plan for diabetes, such as carbohydrate counting. Counting carbs helps you know how much carbohydrate you're eating. The amount of carbohydrate you eat is more important than the glycemic index of foods in helping you control your blood sugar. · The rating of a food can change depending on ripeness, how it is prepared (juiced, mashed, ground), how it is cooked, and how long it is stored. · People respond differently to the glycemic content of foods. Many things affect the glycemic index. The only way to know for sure how a food affects your blood sugar is to check your blood sugar before and after you eat that food. · High GI foods are rarely eaten on their own.  This means that the glycemic index might not be helpful unless you're eating a food by itself. Eating foods together can change their rating. What are examples of foods in the glycemic index? · High glycemic index foods include:  ? Watermelon. ? Baked potatoes, such as a russet. ? Pumpkin. ? Instant oatmeal.  ? White bread. · Medium glycemic index foods include:  ? Hamburger buns (white). ? Brown rice. · Low glycemic index foods include:  ? Apples. ? Sweet potatoes. ? Whole-grain bread. ? Whole wheat spaghetti. ? Dried beans and lentils. Where can you learn more? Go to http://ricardo-rashida.info/. Enter B941 in the search box to learn more about \"Learning About the Glycemic Index. \"  Current as of: July 25, 2018  Content Version: 11.9  © 1141-4507 [employer]. Care instructions adapted under license by [employer] (which disclaims liability or warranty for this information). If you have questions about a medical condition or this instruction, always ask your healthcare professional. [place] [address] any warranty or liability for your use of this information. Counting Carbohydrates: Care Instructions  Your Care Instructions    You don't have to eat special foods when you have diabetes. You just have to be careful to eat healthy foods. Carbohydrates (carbs) raise blood sugar higher and quicker than any other nutrient. Carbs are found in desserts, breads and cereals, and fruit. They're also in starchy vegetables. These include potatoes, corn, and grains such as rice and pasta. Carbs are also in milk and yogurt. The more carbs you eat at one time, the higher your blood sugar will rise. Spreading carbs all through the day helps keep your blood sugar levels within your target range. Counting carbs is one of the best ways to keep your blood sugar under control.   If you use insulin, counting carbs helps you match the right amount of insulin to the number of grams of carbs in a meal. Then you can change your diet and insulin dose as needed. Testing your blood sugar several times a day can help you learn how carbs affect your blood sugar. A registered dietitian or certified diabetes educator can help you plan meals and snacks. Follow-up care is a key part of your treatment and safety. Be sure to make and go to all appointments, and call your doctor if you are having problems. It's also a good idea to know your test results and keep a list of the medicines you take. How can you care for yourself at home? Know your daily amount of carbohydrates  Your daily amount depends on several things, such as your weight, how active you are, which diabetes medicines you take, and what your goals are for your blood sugar levels. A registered dietitian or certified diabetes educator can help you plan how many carbs to include in each meal and snack. For most adults, a guideline for the daily amount of carbs is:  · 45 to 60 grams at each meal. That's about the same as 3 to 4 carbohydrate servings. · 15 to 20 grams at each snack. That's about the same as 1 carbohydrate serving. Count carbs  Counting carbs lets you know how much rapid-acting insulin to take before you eat. If you use an insulin pump, you get a constant rate of insulin during the day. So the pump must be programmed at meals. This gives you extra insulin to cover the rise in blood sugar after meals. If you take insulin:  · Learn your own insulin-to-carb ratio. You and your diabetes health professional will figure out the ratio. You can do this by testing your blood sugar after meals. For example, you may need a certain amount of insulin for every 15 grams of carbs. · Add up the carb grams in a meal. Then you can figure out how many units of insulin to take based on your insulin-to-carb ratio. · Exercise lowers blood sugar. You can use less insulin than you would if you were not doing exercise. Keep in mind that timing matters.  If you exercise within 1 hour after a meal, your body may need less insulin for that meal than it would if you exercised 3 hours after the meal. Test your blood sugar to find out how exercise affects your need for insulin. If you do or don't take insulin:  · Look at labels on packaged foods. This can tell you how many carbs are in a serving. You can also use guides from the American Diabetes Association. · Be aware of portions, or serving sizes. If a package has two servings and you eat the whole package, you need to double the number of grams of carbohydrate listed for one serving. · Protein, fat, and fiber do not raise blood sugar as much as carbs do. If you eat a lot of these nutrients in a meal, your blood sugar will rise more slowly than it would otherwise. Eat from all food groups  · Eat at least three meals a day. · Plan meals to include food from all the food groups. The food groups include grains, fruits, dairy, proteins, and vegetables. · Talk to your dietitian or diabetes educator about ways to add limited amounts of sweets into your meal plan. · If you drink alcohol, talk to your doctor. It may not be recommended when you are taking certain diabetes medicines. Where can you learn more? Go to http://ricardo-rashida.info/. Enter S420 in the search box to learn more about \"Counting Carbohydrates: Care Instructions. \"  Current as of: July 25, 2018  Content Version: 11.9  © 6984-3552 [employer]. Care instructions adapted under license by [employer] (which disclaims liability or warranty for this information). If you have questions about a medical condition or this instruction, always ask your healthcare professional. [place] [address] any warranty or liability for your use of this information.

## 2019-03-27 NOTE — PROGRESS NOTES
HISTORY OF PRESENT ILLNESS  Ramone Perez is a 46 y.o. female. Cough   The history is provided by the patient and medical records. Pertinent negatives include no chest pain. Patient Active Problem List   Diagnosis Code    Current moderate episode of major depressive disorder (HCC) F32.1    Hyperlipidemia E78.5    Acquired hypothyroidism E03.9    Fibromyalgia M79.7    Obesity (BMI 30-39. 9) E66.9    Tobacco use Z72.0    Gastroesophageal reflux disease K21.9    Lymphocytic colitis K52.832       Current Outpatient Medications:     amitriptyline (ELAVIL) 100 mg tablet, Take  by mouth nightly., Disp: , Rfl:     DULoxetine (CYMBALTA) 60 mg capsule, Take 60 mg by mouth daily. , Disp: , Rfl:     gabapentin (NEURONTIN) 300 mg capsule, Take 300 mg by mouth three (3) times daily. , Disp: , Rfl:     ARIPiprazole (ABILIFY) 10 mg tablet, Take 10 mg by mouth daily. , Disp: , Rfl:     levothyroxine (SYNTHROID) 112 mcg tablet, Take  by mouth Daily (before breakfast). , Disp: , Rfl:     atorvastatin calcium (LIPITOR PO), Take  by mouth., Disp: , Rfl:     ALPRAZolam (XANAX) 0.5 mg tablet, Take  by mouth., Disp: , Rfl:     raNITIdine (ZANTAC) 150 mg tablet, Take 1 Tab by mouth two (2) times a day., Disp: 180 Tab, Rfl: 1    ibuprofen (MOTRIN) 600 mg tablet, Take 1 Tab by mouth every six (6) hours as needed for Pain., Disp: 20 Tab, Rfl: 0    Allergies   Allergen Reactions    Bactrim [Sulfamethoprim] Unknown (comments)    Bactrim [Sulfamethoprim] Other (comments)     Thrush         Review of Systems   Constitutional: Negative for chills, fever and weight loss. Inability to lose weight   Respiratory: Positive for cough, sputum production (a few months) and wheezing. Cardiovascular: Negative for chest pain and palpitations.      Visit Vitals  /73 (BP 1 Location: Left arm, BP Patient Position: Sitting)   Pulse 95   Temp 96.4 °F (35.8 °C) (Oral)   Resp 22   Ht 5' 5.5\" (1.664 m)   Wt 206 lb (93.4 kg)   SpO2 99%   BMI 33.76 kg/m²       Physical Exam   Constitutional: She is oriented to person, place, and time. She appears well-developed and well-nourished. HENT:   Head: Normocephalic. Eyes: Conjunctivae and EOM are normal.   Neck: Neck supple. Cardiovascular: Normal rate, regular rhythm and normal heart sounds. Pulmonary/Chest: Effort normal and breath sounds normal.   Musculoskeletal: She exhibits no edema. Neurological: She is alert and oriented to person, place, and time. Skin: Skin is warm and dry. Psychiatric: She has a normal mood and affect. Her behavior is normal.   Nursing note and vitals reviewed. Chest CT from 2017 showed central lobar emphysema  ASSESSMENT and PLAN    ICD-10-CM ICD-9-CM    1. Centrilobular emphysema (HCC) J43.2 492.8 albuterol (PROVENTIL HFA, VENTOLIN HFA, PROAIR HFA) 90 mcg/actuation inhaler      fluticasone propionate (FLOVENT HFA) 110 mcg/actuation inhaler   2. Tobacco use Z72.0 305.1    3. Obesity (BMI 30-39. 9) E66.9 278.00    Albuterol inhaler, 2 puffs up to every 4 hours if needed for wheezing, cough, shortness of breath  Fluticasone inhaler - 2 puffs twice daily rinse mouth and throat after use. Return for follow up in 6 weeks, sooner with any problems. The patient was counseled on the dangers of tobacco use, and was advised to quit. Reviewed strategies to maximize success, including written materials. Avoid dietary starch and sugar and follow a program of regular aerobic exercise. Verbal and written recommendations provided.

## 2019-03-28 NOTE — TELEPHONE ENCOUNTER
Called and left message for patient that her orders are in and she can just stop by the office to get labs drawn.

## 2019-03-29 ENCOUNTER — HOSPITAL ENCOUNTER (OUTPATIENT)
Dept: LAB | Age: 49
Discharge: HOME | End: 2019-03-29
Payer: COMMERCIAL

## 2019-03-29 ENCOUNTER — TELEPHONE (OUTPATIENT)
Dept: FAMILY MEDICINE CLINIC | Age: 49
End: 2019-03-29

## 2019-03-29 DIAGNOSIS — Z86.39 HISTORY OF VITAMIN D DEFICIENCY: ICD-10-CM

## 2019-03-29 PROCEDURE — 36415 COLL VENOUS BLD VENIPUNCTURE: CPT

## 2019-03-29 PROCEDURE — 82306 VITAMIN D 25 HYDROXY: CPT

## 2019-03-29 NOTE — TELEPHONE ENCOUNTER
Pt called to request a transfer of care from Dr. Phoebe Everett to Jaswinder Tamayo. It states that she originally requested  and she was unavailable but states that she will be much happier with Jaswinder Tamayo and she heard that she deals with fibromyalgia and pt has that so she would like to see her for that too.  Pt call back [phone]

## 2019-03-29 NOTE — TELEPHONE ENCOUNTER
First of all, I am not a fibromyalgia specialist.  Also, I am not in agreement with patients switching among doctors within the same practice. Please give her some alternate practices in the area which she can call to establish herself with a new PCP.

## 2019-03-30 DIAGNOSIS — E55.9 VITAMIN D DEFICIENCY: Primary | ICD-10-CM

## 2019-03-30 LAB — 25(OH)D3 SERPL-MCNC: 17.1 NG/ML (ref 30–100)

## 2019-03-30 RX ORDER — ERGOCALCIFEROL 1.25 MG/1
50000 CAPSULE ORAL
Qty: 9 CAP | Refills: 0 | Status: SHIPPED | OUTPATIENT
Start: 2019-03-30 | End: 2019-07-24 | Stop reason: SDUPTHER

## 2019-03-30 NOTE — PROGRESS NOTES
The Vitamin D level was quite low. I recommend Vitamin D2, (ergocalciferol) 50,000 international units weekly, to be continued indefinitely pending future Vitamin D levels. The prescription(s) has been sent to the patient's pharmacy. I also recommend starting OTC Vitamin D3, 2,000 international units daily. A vitamin D level should be checked again in 6 months. I have been advised that she has chosen not to continue with me as her primary care provider so follow-up and refills will need to be with her new PCP.

## 2019-04-02 ENCOUNTER — TELEPHONE (OUTPATIENT)
Dept: FAMILY MEDICINE CLINIC | Age: 49
End: 2019-04-02

## 2019-04-02 NOTE — TELEPHONE ENCOUNTER
Pt called to switch providers from [facility] to [facility], told her Dr. Suzie Mo answer and now pt is wanting to switch to [facility] [place] ([facility]) or [facility]. \" Told pt that Dr. Farzaneh Acevedo is definitely not taking on new cases and that since Dr. Joon Noble has put a note on her chart, then the other provides will be able to see that too and it is not recommended to hop from one provider to another just because of dissatisfaction. Pt claims that it is more than that and Dr. Nisreen Mcneill is very dismissive with her medical needs and that he \"seemed like he just didn't care. \" Pt states other medical needs that she feel like Dr. Nisreen Mcneill has not addressed. Gave her the option of receiving care at [facility] and pt just asked for Dr. Honorio Lu. Told her that all I could do is ask and we will get back to her with an answer.  [phone]

## 2019-04-03 NOTE — TELEPHONE ENCOUNTER
No, recommend providers at [facility], [facility]..

## 2019-05-08 ENCOUNTER — HOSPITAL ENCOUNTER (OUTPATIENT)
Dept: LAB | Age: 49
Discharge: HOME | End: 2019-05-08
Payer: MEDICAID

## 2019-05-08 ENCOUNTER — OFFICE VISIT (OUTPATIENT)
Dept: FAMILY MEDICINE CLINIC | Age: 49
End: 2019-05-08

## 2019-05-08 VITALS
TEMPERATURE: 98.4 F | SYSTOLIC BLOOD PRESSURE: 136 MMHG | WEIGHT: 207.6 LBS | RESPIRATION RATE: 16 BRPM | BODY MASS INDEX: 33.37 KG/M2 | HEART RATE: 97 BPM | OXYGEN SATURATION: 98 % | HEIGHT: 66 IN | DIASTOLIC BLOOD PRESSURE: 86 MMHG

## 2019-05-08 DIAGNOSIS — E66.9 OBESITY (BMI 30-39.9): ICD-10-CM

## 2019-05-08 DIAGNOSIS — Z72.0 TOBACCO USE: ICD-10-CM

## 2019-05-08 DIAGNOSIS — L60.9 FINGERNAIL PROBLEM: ICD-10-CM

## 2019-05-08 DIAGNOSIS — R60.0 BILATERAL LOWER EXTREMITY EDEMA: ICD-10-CM

## 2019-05-08 DIAGNOSIS — E03.9 ACQUIRED HYPOTHYROIDISM: ICD-10-CM

## 2019-05-08 DIAGNOSIS — R53.82 CHRONIC FATIGUE: ICD-10-CM

## 2019-05-08 DIAGNOSIS — K21.9 GASTROESOPHAGEAL REFLUX DISEASE, ESOPHAGITIS PRESENCE NOT SPECIFIED: ICD-10-CM

## 2019-05-08 DIAGNOSIS — F32.1 CURRENT MODERATE EPISODE OF MAJOR DEPRESSIVE DISORDER, UNSPECIFIED WHETHER RECURRENT (HCC): ICD-10-CM

## 2019-05-08 DIAGNOSIS — E55.9 VITAMIN D DEFICIENCY: ICD-10-CM

## 2019-05-08 DIAGNOSIS — J43.2 CENTRILOBULAR EMPHYSEMA (HCC): ICD-10-CM

## 2019-05-08 DIAGNOSIS — M79.7 FIBROMYALGIA: Primary | ICD-10-CM

## 2019-05-08 DIAGNOSIS — E78.5 HYPERLIPIDEMIA, UNSPECIFIED HYPERLIPIDEMIA TYPE: ICD-10-CM

## 2019-05-08 LAB
ALBUMIN SERPL-MCNC: 4 G/DL (ref 3.4–5)
ALBUMIN/GLOB SERPL: 1.3 {RATIO} (ref 0.8–1.7)
ALP SERPL-CCNC: 98 U/L (ref 45–117)
ALT SERPL-CCNC: 45 U/L (ref 13–56)
ANION GAP SERPL CALC-SCNC: 9 MMOL/L (ref 3–18)
AST SERPL-CCNC: 34 U/L (ref 15–37)
BASOPHILS # BLD: 0 K/UL (ref 0–0.1)
BASOPHILS NFR BLD: 0 % (ref 0–2)
BILIRUB SERPL-MCNC: 0.5 MG/DL (ref 0.2–1)
BUN SERPL-MCNC: 11 MG/DL (ref 7–18)
BUN/CREAT SERPL: 12 (ref 12–20)
CALCIUM SERPL-MCNC: 9.1 MG/DL (ref 8.5–10.1)
CHLORIDE SERPL-SCNC: 101 MMOL/L (ref 100–108)
CHOLEST SERPL-MCNC: 200 MG/DL
CO2 SERPL-SCNC: 27 MMOL/L (ref 21–32)
CREAT SERPL-MCNC: 0.95 MG/DL (ref 0.6–1.3)
DIFFERENTIAL METHOD BLD: NORMAL
EOSINOPHIL # BLD: 0.3 K/UL (ref 0–0.4)
EOSINOPHIL NFR BLD: 4 % (ref 0–5)
ERYTHROCYTE [DISTWIDTH] IN BLOOD BY AUTOMATED COUNT: 12.9 % (ref 11.6–14.5)
GLOBULIN SER CALC-MCNC: 3.1 G/DL (ref 2–4)
GLUCOSE SERPL-MCNC: 94 MG/DL (ref 74–99)
HCT VFR BLD AUTO: 43.8 % (ref 35–45)
HDLC SERPL-MCNC: 44 MG/DL (ref 40–60)
HDLC SERPL: 4.5 {RATIO} (ref 0–5)
HGB BLD-MCNC: 14.2 G/DL (ref 12–16)
LDLC SERPL CALC-MCNC: 112.6 MG/DL (ref 0–100)
LIPID PROFILE,FLP: ABNORMAL
LYMPHOCYTES # BLD: 2.6 K/UL (ref 0.9–3.6)
LYMPHOCYTES NFR BLD: 35 % (ref 21–52)
MCH RBC QN AUTO: 31.1 PG (ref 24–34)
MCHC RBC AUTO-ENTMCNC: 32.4 G/DL (ref 31–37)
MCV RBC AUTO: 96.1 FL (ref 74–97)
MONOCYTES # BLD: 0.4 K/UL (ref 0.05–1.2)
MONOCYTES NFR BLD: 5 % (ref 3–10)
NEUTS SEG # BLD: 4.2 K/UL (ref 1.8–8)
NEUTS SEG NFR BLD: 56 % (ref 40–73)
PLATELET # BLD AUTO: 246 K/UL (ref 135–420)
PMV BLD AUTO: 10.4 FL (ref 9.2–11.8)
POTASSIUM SERPL-SCNC: 4.3 MMOL/L (ref 3.5–5.5)
PROT SERPL-MCNC: 7.1 G/DL (ref 6.4–8.2)
RBC # BLD AUTO: 4.56 M/UL (ref 4.2–5.3)
SODIUM SERPL-SCNC: 137 MMOL/L (ref 136–145)
T4 FREE SERPL-MCNC: 0.9 NG/DL (ref 0.7–1.5)
TRIGL SERPL-MCNC: 217 MG/DL (ref ?–150)
TSH SERPL DL<=0.05 MIU/L-ACNC: 1.69 UIU/ML (ref 0.36–3.74)
VLDLC SERPL CALC-MCNC: 43.4 MG/DL
WBC # BLD AUTO: 7.4 K/UL (ref 4.6–13.2)

## 2019-05-08 PROCEDURE — 36415 COLL VENOUS BLD VENIPUNCTURE: CPT

## 2019-05-08 PROCEDURE — 80061 LIPID PANEL: CPT

## 2019-05-08 PROCEDURE — 82306 VITAMIN D 25 HYDROXY: CPT

## 2019-05-08 PROCEDURE — 84443 ASSAY THYROID STIM HORMONE: CPT

## 2019-05-08 PROCEDURE — 80053 COMPREHEN METABOLIC PANEL: CPT

## 2019-05-08 PROCEDURE — 84439 ASSAY OF FREE THYROXINE: CPT

## 2019-05-08 PROCEDURE — 85025 COMPLETE CBC W/AUTO DIFF WBC: CPT

## 2019-05-08 RX ORDER — METHYLPREDNISOLONE 4 MG/1
TABLET ORAL
Qty: 1 DOSE PACK | Refills: 1 | Status: SHIPPED | OUTPATIENT
Start: 2019-05-08 | End: 2019-06-10 | Stop reason: ALTCHOICE

## 2019-05-08 NOTE — PROGRESS NOTES
Karthik Henderson is a 46 y.o. female (: 1970) presenting to address: Chief Complaint Patient presents with  Vitamin D Deficiency f/u  Weight Gain Vitals:  
 19 1305 BP: 136/86 Pulse: 97 Resp: 16 Temp: 98.4 °F (36.9 °C) TempSrc: Oral  
SpO2: 98% Weight: 207 lb 9.6 oz (94.2 kg) Height: 5' 5.5\" (1.664 m) PainSc:   6 PainLoc: Generalized Hearing/Vision: No exam data present Learning Assessment:  
 
Learning Assessment 2018 PRIMARY LEARNER Patient HIGHEST LEVEL OF EDUCATION - PRIMARY LEARNER  DID NOT GRADUATE HIGH SCHOOL  
BARRIERS PRIMARY LEARNER NONE  
CO-LEARNER CAREGIVER No  
PRIMARY LANGUAGE ENGLISH  
LEARNER PREFERENCE PRIMARY LISTENING  
ANSWERED BY patient RELATIONSHIP SELF Depression Screening:  
 
3 most recent PHQ Screens 2018 Little interest or pleasure in doing things Several days Feeling down, depressed, irritable, or hopeless Several days Total Score PHQ 2 2 Fall Risk Assessment:  
 
Fall Risk Assessment, last 12 mths 2019 Able to walk? Yes Fall in past 12 months? No  
 
Abuse Screening:  
 
Abuse Screening Questionnaire 2019 Do you ever feel afraid of your partner? Danyell Ores Are you in a relationship with someone who physically or mentally threatens you? Danyell Ores Is it safe for you to go home? Zeeshan Guardian Coordination of Care Questionaire: 1. Have you been to the ER, urgent care clinic since your last visit? Hospitalized since your last visit? NO 
 
2. Have you seen or consulted any other health care providers outside of the [address] [place] [address] since your last visit? Include any pap smears or colon screening. NO Advanced Directive: 1. Do you have an Advanced Directive? NO 
 
2. Would you like information on Advanced Directives?  NO

## 2019-05-08 NOTE — PATIENT INSTRUCTIONS
Further disposition pending lab results if indicated. Elevate the legs above horizontal as often as possible. Avoid salt and prolonged sitting and standing. Consider support hose or compression stockings. Avoid dietary starch sugar and salt and is much as possible follow a program of regular aerobic exercise. Medrol Dosepak as per Dosepak instructions, may repeat Dosepak x1 if needed. Dermatology referral 
  
Leg and Ankle Edema: Care Instructions Your Care Instructions Swelling in the legs, ankles, and feet is called edema. It is common after you sit or stand for a while. Long plane flights or car rides often cause swelling in the legs and feet. You may also have swelling if you have to stand for long periods of time at your job. Problems with the veins in the legs (varicose veins) and changes in hormones can also cause swelling. Sometimes the swelling in the ankles and feet is caused by a more serious problem, such as heart failure, infection, blood clots, or liver or kidney disease. Follow-up care is a key part of your treatment and safety. Be sure to make and go to all appointments, and call your doctor if you are having problems. It's also a good idea to know your test results and keep a list of the medicines you take. How can you care for yourself at home? · If your doctor gave you medicine, take it as prescribed. Call your doctor if you think you are having a problem with your medicine. · Whenever you are resting, raise your legs up. Try to keep the swollen area higher than the level of your heart. · Take breaks from standing or sitting in one position. ? Walk around to increase the blood flow in your lower legs. ? Move your feet and ankles often while you stand, or tighten and relax your leg muscles. · Wear support stockings. Put them on in the morning, before swelling gets worse. · Eat a balanced diet. Lose weight if you need to. · Limit the amount of salt (sodium) in your diet. Salt holds fluid in the body and may increase swelling. When should you call for help? Call 911 anytime you think you may need emergency care. For example, call if: 
  · You have symptoms of a blood clot in your lung (called a pulmonary embolism). These may include: 
? Sudden chest pain. ? Trouble breathing. ? Coughing up blood.  
 Call your doctor now or seek immediate medical care if: 
  · You have signs of a blood clot, such as: 
? Pain in your calf, back of the knee, thigh, or groin. ? Redness and swelling in your leg or groin.  
  · You have symptoms of infection, such as: 
? Increased pain, swelling, warmth, or redness. ? Red streaks or pus. ? A fever.  
 Watch closely for changes in your health, and be sure to contact your doctor if: 
  · Your swelling is getting worse.  
  · You have new or worsening pain in your legs.  
  · You do not get better as expected. Where can you learn more? Go to http://ricardo-rashida.info/. Enter E600 in the search box to learn more about \"Leg and Ankle Edema: Care Instructions. \" Current as of: September 23, 2018 Content Version: 11.9 © 4008-5233 [employer]. Care instructions adapted under license by [employer] (which disclaims liability or warranty for this information). If you have questions about a medical condition or this instruction, always ask your healthcare professional. [place] [address] any warranty or liability for your use of this information.

## 2019-05-08 NOTE — PROGRESS NOTES
HISTORY OF PRESENT ILLNESS Deepika Olson is a 46 y.o. female. Follow Up Chronic Condition The history is provided by the patient and medical records. Pertinent negatives include no chest pain, no abdominal pain and no shortness of breath. Mr#: [id number] Patient Active Problem List  
Diagnosis Code  Current moderate episode of major depressive disorder (HCC) F32.1  Hyperlipidemia E78.5  Acquired hypothyroidism E03.9  Fibromyalgia M79.7  Obesity (BMI 30-39. 9) E66.9  Tobacco use Z72.0  Gastroesophageal reflux disease K21.9  Lymphocytic colitis K52.832  Vitamin D deficiency E55.9 Current Outpatient Medications:  
  ergocalciferol (ERGOCALCIFEROL) 50,000 unit capsule, Take 1 Cap by mouth every seven (7) days. , Disp: 9 Cap, Rfl: 0 
  amitriptyline (ELAVIL) 100 mg tablet, Take  by mouth nightly., Disp: , Rfl:  
  albuterol (PROVENTIL HFA, VENTOLIN HFA, PROAIR HFA) 90 mcg/actuation inhaler, Albuterol inhaler, 2 puffs up to every 4 hours if needed for wheezing, cough, shortness of breath, Disp: 1 Inhaler, Rfl: 11 
  fluticasone propionate (FLOVENT HFA) 110 mcg/actuation inhaler, Take 2 Puffs by inhalation every twelve (12) hours. , Disp: 1 Inhaler, Rfl: 11 
  DULoxetine (CYMBALTA) 60 mg capsule, Take 60 mg by mouth daily. , Disp: , Rfl:  
  gabapentin (NEURONTIN) 300 mg capsule, Take 300 mg by mouth three (3) times daily. , Disp: , Rfl:  
  ARIPiprazole (ABILIFY) 10 mg tablet, Take 10 mg by mouth daily. , Disp: , Rfl:  
  levothyroxine (SYNTHROID) 112 mcg tablet, Take  by mouth Daily (before breakfast). , Disp: , Rfl:  
  atorvastatin calcium (LIPITOR PO), Take  by mouth., Disp: , Rfl:  
  ALPRAZolam (XANAX) 0.5 mg tablet, Take  by mouth., Disp: , Rfl:  
  raNITIdine (ZANTAC) 150 mg tablet, Take 1 Tab by mouth two (2) times a day., Disp: 180 Tab, Rfl: 1   ibuprofen (MOTRIN) 600 mg tablet, Take 1 Tab by mouth every six (6) hours as needed for Pain., Disp: 20 Tab, Rfl: 0 
 Allergies Allergen Reactions  Bactrim [Sulfamethoprim] Unknown (comments)  Bactrim [Sulfamethoprim] Other (comments) Patrice Murillo Review of Systems Constitutional: Positive for malaise/fatigue ( Feels excessively fatigued). Negative for fever and weight loss. Concerned about persistent weight gain despite appropriate diet Eyes: Negative for blurred vision. Respiratory: Positive for cough ( better with inhaler). Negative for shortness of breath. Cardiovascular: Positive for leg swelling ( That her ankles and feet swell off and on through the day regardless of her position and this occurs without associated pain). Negative for chest pain, palpitations and orthopnea. Gastrointestinal: Positive for heartburn ( Well-controlled on ranitidine). Negative for abdominal pain. Musculoskeletal: Positive for myalgias ( Reports that symptoms have flared recently, previously diagnosed with fibromyalgia). Recurrent swelling of feet and ankles, not painful Skin: Negative for rash. horizontal ridges on fingernails Neurological: Negative for dizziness, speech change and focal weakness. Psychiatric/Behavioral: Positive for depression ( Feels symptoms are adequately controlled on current medications). Negative for suicidal ideas. Visit Vitals /86 (BP 1 Location: Right arm, BP Patient Position: Sitting) Pulse 97 Temp 98.4 °F (36.9 °C) (Oral) Resp 16 Ht 5' 5.5\" (1.664 m) Wt 207 lb 9.6 oz (94.2 kg) SpO2 98% BMI 34.02 kg/m² Physical Exam  
Constitutional: She is oriented to person, place, and time. She appears well-developed and well-nourished. HENT:  
Head: Normocephalic. Eyes: Conjunctivae and EOM are normal.  
Neck: Neck supple. Cardiovascular: Normal rate, regular rhythm, normal heart sounds and intact distal pulses.   
Pulmonary/Chest: Effort normal and breath sounds normal.  
 Abdominal: Soft. Bowel sounds are normal. There is no tenderness. There is no rebound and no guarding. Musculoskeletal: She exhibits edema ( Trace pretibial edema, no ankle or pedal edema noted). Neurological: She is alert and oriented to person, place, and time. Skin: Skin is warm and dry. Superficial, uniform transverse ridges on all fingernails, appearances not in any way consistent with beau's lines Psychiatric: She has a normal mood and affect. Her behavior is normal.  
Nursing note and vitals reviewed. Results for Meera Campo (MRN [id number]) as of 5/8/2019 15:56 Ref. Range 12/6/2018 14:30 3/29/2019 08:57 Cholesterol, total Latest Ref Range: <200 MG/ (H) HDL Cholesterol Latest Ref Range: 40 - 60 MG/DL 60   
CHOL/HDL Ratio Latest Ref Range: 0 - 5.0   3.5 LDL, calculated Latest Ref Range: 0 - 100 MG/.4 (H) VLDL, calculated Latest Units: MG/DL 23.6 Hemoglobin A1c, (calculated) Latest Ref Range: 4.2 - 5.6 % 5.6 Est. average glucose Latest Units: mg/dL 114   
T4, Free Latest Ref Range: 0.7 - 1.5 NG/DL 0.8 TSH Latest Ref Range: 0.36 - 3.74 uIU/mL 3.50 Glucose Latest Ref Range: 74 - 99 mg/dL 101 (H) Vitamin D 25-Hydroxy Latest Ref Range: 30 - 100 ng/mL  17.1 (L) ASSESSMENT and PLAN 
  ICD-10-CM ICD-9-CM 1. Fibromyalgia M79.7 729.1 2. Acquired hypothyroidism E03.9 244.9 TSH 3RD GENERATION  
   T4, FREE 3. Hyperlipidemia, unspecified hyperlipidemia type E78.5 272.4 LIPID PANEL 4. Centrilobular emphysema (HCC) J43.2 492.8 5. Tobacco use Z72.0 305.1 6. Gastroesophageal reflux disease, esophagitis presence not specified K21.9 530.81   
7. Current moderate episode of major depressive disorder, unspecified whether recurrent (HCC) F32.1 296.22   
8. Obesity (BMI 30-39. 9) E66.9 278.00   
9. Vitamin D deficiency E55.9 268.9 VITAMIN D, 25 HYDROXY 10. Bilateral lower extremity edema M29.5 956.3 METABOLIC PANEL, COMPREHENSIVE  
 11. Fingernail problem L60.9 703.9 CBC WITH AUTOMATED DIFF  
   REFERRAL TO DERMATOLOGY 12. Chronic fatigue X05.34 812.90 METABOLIC PANEL, COMPREHENSIVE  
   TSH 3RD GENERATION  
   T4, FREE  
   CBC WITH AUTOMATED DIFF Further disposition pending lab results if indicated. Elevate the legs above horizontal as often as possible. Avoid salt and prolonged sitting and standing. Consider support hose or compression stockings. Avoid dietary starch sugar and salt and is much as possible follow a program of regular aerobic exercise. Medrol Dosepak as per Dosepak instructions, may repeat Dosepak x1 if needed.  
Dermatology referral

## 2019-05-09 LAB — 25(OH)D3 SERPL-MCNC: 35.2 NG/ML (ref 30–100)

## 2019-05-09 NOTE — PROGRESS NOTES
Please advise Ms. Carlene Aleman that her lab results show her thyroid and vitamin D levels in the normal range on her current medications which should be continued. Her cholesterol levels are minimally elevated on her current dose of atorvastatin which does not need to be increased, however she should continue to do her best to avoid dietary starch and sugar.   Her comprehensive metabolic panel and complete blood count results were completely normal.

## 2019-05-10 ENCOUNTER — TELEPHONE (OUTPATIENT)
Dept: FAMILY MEDICINE CLINIC | Age: 49
End: 2019-05-10

## 2019-05-10 NOTE — TELEPHONE ENCOUNTER
Pt called returning a call for their results. The nurse was unavailable so I informed them of Dr Monse Laws results. Pt voiced understanding and does not have any concerns at this time.

## 2019-05-17 NOTE — PROGRESS NOTES
Spoke with patient regarding recent lab results; gave patient referral name and number for dermatology.

## 2019-05-22 ENCOUNTER — TELEPHONE (OUTPATIENT)
Dept: FAMILY MEDICINE CLINIC | Age: 49
End: 2019-05-22

## 2019-05-22 NOTE — TELEPHONE ENCOUNTER
Please advised that the prednisone was prescribed to help her through the pain associated with a fibromyalgia flare. It is not recommended or safe to take prednisone frequently. It suppresses immunity, exacerbates edema, increase his weight gain and blood sugar levels.

## 2019-05-22 NOTE — TELEPHONE ENCOUNTER
Pt called today stating that was here to see Dr. Sasha Mccarty and he prescribed her 2 rounds of steroids for chronic fatigue. Pt states that the doses really helped and would like another refill.  [phone]

## 2019-05-22 NOTE — TELEPHONE ENCOUNTER
Spoke with patient regarding the side effects of long term steroid use; patient states she understands.

## 2019-06-09 NOTE — PROGRESS NOTES
HISTORY OF PRESENT ILLNESS  Jaskaran Puentes is a 46 y.o. female. Ms. Fannie Puentes presents for follow-up of chronic pain associated with fibromyalgia and is especially concerned about episodic swelling in her lower legs. Follow Up Chronic Condition   The history is provided by the patient and medical records. This is a chronic problem. Pertinent negatives include no chest pain, no abdominal pain and no shortness of breath. Mr#: [id number]      Patient Active Problem List   Diagnosis Code    [facility] F32.1    Hyperlipidemia E78.5    Acquired hypothyroidism E03.9    Fibromyalgia M79.7    Obesity (BMI 30-39. 9) E66.9    Tobacco use Z72.0    Gastroesophageal reflux disease K21.9    Lymphocytic colitis K52.832    Vitamin D deficiency E55.9         Current Outpatient Medications:     methylPREDNISolone (MEDROL DOSEPACK) 4 mg tablet, Follow Dosepak instructions, Disp: 1 Dose Pack, Rfl: 1    ergocalciferol (ERGOCALCIFEROL) 50,000 unit capsule, Take 1 Cap by mouth every seven (7) days. , Disp: 9 Cap, Rfl: 0    amitriptyline (ELAVIL) 100 mg tablet, Take  by mouth nightly., Disp: , Rfl:     albuterol (PROVENTIL HFA, VENTOLIN HFA, PROAIR HFA) 90 mcg/actuation inhaler, Albuterol inhaler, 2 puffs up to every 4 hours if needed for wheezing, cough, shortness of breath, Disp: 1 Inhaler, Rfl: 11    fluticasone propionate (FLOVENT HFA) 110 mcg/actuation inhaler, Take 2 Puffs by inhalation every twelve (12) hours. , Disp: 1 Inhaler, Rfl: 11    DULoxetine (CYMBALTA) 60 mg capsule, Take 60 mg by mouth daily. , Disp: , Rfl:     gabapentin (NEURONTIN) 300 mg capsule, Take 300 mg by mouth three (3) times daily. , Disp: , Rfl:     ARIPiprazole (ABILIFY) 10 mg tablet, Take 10 mg by mouth daily. , Disp: , Rfl:     levothyroxine (SYNTHROID) 112 mcg tablet, Take  by mouth Daily (before breakfast). , Disp: , Rfl:     atorvastatin calcium (LIPITOR PO), Take 20 mg by mouth daily. , Disp: , Rfl:     ALPRAZolam (XANAX) 0.5 mg tablet, Take  by mouth., Disp: , Rfl:     raNITIdine (ZANTAC) 150 mg tablet, Take 1 Tab by mouth two (2) times a day., Disp: 180 Tab, Rfl: 1    ibuprofen (MOTRIN) 600 mg tablet, Take 1 Tab by mouth every six (6) hours as needed for Pain., Disp: 20 Tab, Rfl: 0     Allergies   Allergen Reactions    Bactrim [Sulfamethoprim] Unknown (comments)    Bactrim [Sulfamethoprim] Other (comments)     Thrush         Review of Systems   Constitutional: Negative for fever and weight loss. Respiratory: Negative for shortness of breath. Cardiovascular: Positive for leg swelling ( Intermittent). Negative for chest pain and palpitations. Gastrointestinal: Negative for abdominal pain. Musculoskeletal: Positive for back pain ( Chronic) and myalgias ( Chronic). Neurological: Negative for focal weakness. Psychiatric/Behavioral: Positive for depression. Negative for suicidal ideas. The patient is nervous/anxious. Followed by psychiatry, doing reasonably well on current medications     Visit Vitals  /64 (BP 1 Location: Right arm, BP Patient Position: Sitting)   Pulse (!) 103   Temp 97.7 °F (36.5 °C) (Oral)   Resp 16   Ht 5' 5.5\" (1.664 m)   Wt 209 lb (94.8 kg)   SpO2 90%   BMI 34.25 kg/m²       Physical Exam   Constitutional: She is oriented to person, place, and time. She appears well-developed and well-nourished. HENT:   Head: Normocephalic. Eyes: Conjunctivae and EOM are normal.   Neck: Neck supple. Cardiovascular: Normal rate, regular rhythm and normal heart sounds. Pulmonary/Chest: Effort normal and breath sounds normal.   Musculoskeletal: She exhibits edema ( 1+ pitting edema, pretibial, bilateral). Neurological: She is alert and oriented to person, place, and time. Skin: Skin is warm and dry. Psychiatric: She has a normal mood and affect. Her behavior is normal.   Nursing note and vitals reviewed.       ASSESSMENT and PLAN    ICD-10-CM ICD-9-CM 1. Fibromyalgia M79.7 729.1    2. Bilateral lower extremity edema R60.0 782.3    3. Acquired hypothyroidism E50.1 858.4 METABOLIC PANEL, BASIC      TSH 3RD GENERATION      T4, FREE   4. Current moderate episode of major depressive disorder, unspecified whether recurrent (HCC) Z20.3 058.84 METABOLIC PANEL, BASIC   5. Vitamin D deficiency E55.9 268.9 VITAMIN D, 25 HYDROXY   6. Obesity (BMI 30-39. 9) E66.9 278.00    7. Hyperlipidemia, unspecified hyperlipidemia type E78.5 272.4 LIPID PANEL      METABOLIC PANEL, BASIC   Check with insurance to determine which rheumatologists except insurance and call back with a name for referral.  Elevate the legs above horizontal as often as possible. Avoid salt and prolonged sitting and standing. Consider support hose or compression stockings. Avoid dietary starch and sugar and is much as possible follow a program of regular aerobic exercise. Continue current medications. Return for follow-up in 5 months, sooner with any problems. Return for fasting lab a few days before that appointment.

## 2019-06-10 ENCOUNTER — OFFICE VISIT (OUTPATIENT)
Dept: FAMILY MEDICINE CLINIC | Age: 49
End: 2019-06-10

## 2019-06-10 VITALS
DIASTOLIC BLOOD PRESSURE: 64 MMHG | SYSTOLIC BLOOD PRESSURE: 106 MMHG | HEART RATE: 103 BPM | OXYGEN SATURATION: 90 % | RESPIRATION RATE: 16 BRPM | BODY MASS INDEX: 33.59 KG/M2 | TEMPERATURE: 97.7 F | WEIGHT: 209 LBS | HEIGHT: 66 IN

## 2019-06-10 DIAGNOSIS — R60.0 BILATERAL LOWER EXTREMITY EDEMA: ICD-10-CM

## 2019-06-10 DIAGNOSIS — E03.9 ACQUIRED HYPOTHYROIDISM: ICD-10-CM

## 2019-06-10 DIAGNOSIS — E66.9 OBESITY (BMI 30-39.9): ICD-10-CM

## 2019-06-10 DIAGNOSIS — E55.9 VITAMIN D DEFICIENCY: ICD-10-CM

## 2019-06-10 DIAGNOSIS — F32.1 CURRENT MODERATE EPISODE OF MAJOR DEPRESSIVE DISORDER, UNSPECIFIED WHETHER RECURRENT (HCC): ICD-10-CM

## 2019-06-10 DIAGNOSIS — M79.7 FIBROMYALGIA: Primary | ICD-10-CM

## 2019-06-10 DIAGNOSIS — E78.5 HYPERLIPIDEMIA, UNSPECIFIED HYPERLIPIDEMIA TYPE: ICD-10-CM

## 2019-06-10 RX ORDER — ACETAMINOPHEN 500 MG
TABLET ORAL
COMMUNITY

## 2019-06-10 NOTE — PATIENT INSTRUCTIONS
Check with insurance to determine which rheumatologists except insurance and call back with a name for referral. 
Elevate the legs above horizontal as often as possible. Avoid salt and prolonged sitting and standing. Consider support hose or compression stockings. Avoid dietary starch and sugar and is much as possible follow a program of regular aerobic exercise. Continue current medications. Return for follow-up in 5 months, sooner with any problems. Return for fasting lab a few days before that appointment. Learning About Venous Insufficiency What is it? Venous insufficiency is a problem with the flow of blood from the veins of the legs back to the heart. It's also called chronic venous insufficiency or chronic venous stasis. Your veins bring blood back to the heart after it flows through your body. Veins have valves that keep the blood moving in one directiontoward the heart. But with venous insufficiency, the veins of the legs might not work as they should. This can allow blood to leak backward. Fluid can pool in the legs. This can lead to problems that include varicose veins. What causes it? Venous insufficiency is sometimes caused by deep vein thrombosis and high blood pressure inside leg veins. You are more likely to have venous insufficiency if you: · Are older. · Are female. · Are overweight. · Don't get enough physical activity. · Smoke. · Have a family history of varicose veins. What are the symptoms? Symptoms of venous insufficiency affect the legs. They may include: · Swelling, often in the ankles. · A rash. · Varicose veins. · Itching. · Cramping. · Skin sores (ulcers). · Aching or a feeling of heaviness. · Changes in skin color. How is it diagnosed? Your doctor can diagnose venous insufficiency by examining your legs and by using a type of ultrasound test (duplex Doppler) to find out how well blood is flowing in your legs. How is it treated? To reduce swelling and relieve pain caused by venous insufficiency, you can wear compression stockings. They are tighter at the ankles than at the top of the legs. They also can help venous skin ulcers heal. But there are different types of stockings, and they need to fit right. So your doctor will recommend what you need. You also can try to: · Get more exercise, especially walking. It can increase blood flow. · Avoid standing still or sitting for a long time, which can make the fluid pool in your legs. · Try not to sit with your legs crossed at the knee. · Keep your legs raised above your heart when you're lying down. This reduces swelling. If these treatments don't work, you may need medicine or a procedure to help relieve symptoms. Procedures might be done to close the vein, to remove the vein, or to improve blood flow. Follow-up care is a key part of your treatment and safety. Be sure to make and go to all appointments, and call your doctor if you are having problems. It's also a good idea to know your test results and keep a list of the medicines you take. Current as of: September 26, 2018 Content Version: 11.9 © 8213-8526 [employer]. Care instructions adapted under license by [employer] (which disclaims liability or warranty for this information). If you have questions about a medical condition or this instruction, always ask your healthcare professional. [address] any warranty or liability for your use of this information. Low Back Pain: Exercises Your Care Instructions Here are some examples of typical rehabilitation exercises for your condition. Start each exercise slowly. Ease off the exercise if you start to have pain. Your doctor or physical therapist will tell you when you can start these exercises and which ones will work best for you. How to do the exercises Press-up 1. Lie on your stomach, supporting your body with your forearms. 2. Press your elbows down into the floor to raise your upper back. As you do this, relax your stomach muscles and allow your back to arch without using your back muscles. As your press up, do not let your hips or pelvis come off the floor. 3. Hold for 15 to 30 seconds, then relax. 4. Repeat 2 to 4 times. Alternate arm and leg (bird dog) exercise 1. Start on the floor, on your hands and knees. 2. Tighten your belly muscles. 3. Raise one leg off the floor, and hold it straight out behind you. Be careful not to let your hip drop down, because that will twist your trunk. 4. Hold for about 6 seconds, then lower your leg and switch to the other leg. 5. Repeat 8 to 12 times on each leg. 6. Over time, work up to holding for 10 to 30 seconds each time. 7. If you feel stable and secure with your leg raised, try raising the opposite arm straight out in front of you at the same time. Knee-to-chest exercise 1. Lie on your back with your knees bent and your feet flat on the floor. 2. Bring one knee to your chest, keeping the other foot flat on the floor (or keeping the other leg straight, whichever feels better on your lower back). 3. Keep your lower back pressed to the floor. Hold for at least 15 to 30 seconds. 4. Relax, and lower the knee to the starting position. 5. Repeat with the other leg. Repeat 2 to 4 times with each leg. 6. To get more stretch, put your other leg flat on the floor while pulling your knee to your chest. 
 
Curl-ups 1. Lie on the floor on your back with your knees bent at a 90-degree angle. Your feet should be flat on the floor, about 12 inches from your buttocks. 2. Cross your arms over your chest. If this bothers your neck, try putting your hands behind your neck (not your head), with your elbows spread apart. 3. Slowly tighten your belly muscles and raise your shoulder blades off the floor. 4. Keep your head in line with your body, and do not press your chin to your chest. 
5. Hold this position for 1 or 2 seconds, then slowly lower yourself back down to the floor. 6. Repeat 8 to 12 times. Pelvic tilt exercise 1. Lie on your back with your knees bent. 2. \"Brace\" your stomach. This means to tighten your muscles by pulling in and imagining your belly button moving toward your spine. You should feel like your back is pressing to the floor and your hips and pelvis are rocking back. 3. Hold for about 6 seconds while you breathe smoothly. 4. Repeat 8 to 12 times. Heel dig bridging 1. Lie on your back with both knees bent and your ankles bent so that only your heels are digging into the floor. Your knees should be bent about 90 degrees. 2. Then push your heels into the floor, squeeze your buttocks, and lift your hips off the floor until your shoulders, hips, and knees are all in a straight line. 3. Hold for about 6 seconds as you continue to breathe normally, and then slowly lower your hips back down to the floor and rest for up to 10 seconds. 4. Do 8 to 12 repetitions. Hamstring stretch in doorway 1. Lie on your back in a doorway, with one leg through the open door. 2. Slide your leg up the wall to straighten your knee. You should feel a gentle stretch down the back of your leg. 3. Hold the stretch for at least 15 to 30 seconds. Do not arch your back, point your toes, or bend either knee. Keep one heel touching the floor and the other heel touching the wall. 4. Repeat with your other leg. 5. Do 2 to 4 times for each leg. Hip flexor stretch 1. Kneel on the floor with one knee bent and one leg behind you. Place your forward knee over your foot. Keep your other knee touching the floor. 2. Slowly push your hips forward until you feel a stretch in the upper thigh of your rear leg. 3. Hold the stretch for at least 15 to 30 seconds. Repeat with your other leg. 4. Do 2 to 4 times on each side. Wall sit 1. Stand with your back 10 to 12 inches away from a wall. 2. Lean into the wall until your back is flat against it. 3. Slowly slide down until your knees are slightly bent, pressing your lower back into the wall. 4. Hold for about 6 seconds, then slide back up the wall. 5. Repeat 8 to 12 times. Follow-up care is a key part of your treatment and safety. Be sure to make and go to all appointments, and call your doctor if you are having problems. It's also a good idea to know your test results and keep a list of the medicines you take. Where can you learn more? Go to http://ricardo-rashida.info/. Enter W874 in the search box to learn more about \"Low Back Pain: Exercises. \" Current as of: September 20, 2018 Content Version: 11.9 © 4396-0469 [employer]. Care instructions adapted under license by [employer] (which disclaims liability or warranty for this information). If you have questions about a medical condition or this instruction, always ask your healthcare professional. [address] any warranty or liability for your use of this information. Counting Carbohydrates: Care Instructions Your Care Instructions You don't have to eat special foods when you have diabetes. You just have to be careful to eat healthy foods. Carbohydrates (carbs) raise blood sugar higher and quicker than any other nutrient. Carbs are found in desserts, breads and cereals, and fruit. They're also in starchy vegetables. These include potatoes, corn, and grains such as rice and pasta. Carbs are also in milk and yogurt. The more carbs you eat at one time, the higher your blood sugar will rise. Spreading carbs all through the day helps keep your blood sugar levels within your target range. Counting carbs is one of the best ways to keep your blood sugar under control. If you use insulin, counting carbs helps you match the right amount of insulin to the number of grams of carbs in a meal. Then you can change your diet and insulin dose as needed. Testing your blood sugar several times a day can help you learn how carbs affect your blood sugar. A registered dietitian or certified diabetes educator can help you plan meals and snacks. Follow-up care is a key part of your treatment and safety. Be sure to make and go to all appointments, and call your doctor if you are having problems. It's also a good idea to know your test results and keep a list of the medicines you take. How can you care for yourself at home? Know your daily amount of carbohydrates Your daily amount depends on several things, such as your weight, how active you are, which diabetes medicines you take, and what your goals are for your blood sugar levels. A registered dietitian or certified diabetes educator can help you plan how many carbs to include in each meal and snack. For most adults, a guideline for the daily amount of carbs is: · 45 to 60 grams at each meal. That's about the same as 3 to 4 carbohydrate servings. · 15 to 20 grams at each snack. That's about the same as 1 carbohydrate serving. Count carbs Counting carbs lets you know how much rapid-acting insulin to take before you eat. If you use an insulin pump, you get a constant rate of insulin during the day. So the pump must be programmed at meals. This gives you extra insulin to cover the rise in blood sugar after meals. If you take insulin: 
· Learn your own insulin-to-carb ratio. You and your diabetes health professional will figure out the ratio. You can do this by testing your blood sugar after meals. For example, you may need a certain amount of insulin for every 15 grams of carbs. · Add up the carb grams in a meal. Then you can figure out how many units of insulin to take based on your insulin-to-carb ratio. · Exercise lowers blood sugar. You can use less insulin than you would if you were not doing exercise. Keep in mind that timing matters. If you exercise within 1 hour after a meal, your body may need less insulin for that meal than it would if you exercised 3 hours after the meal. Test your blood sugar to find out how exercise affects your need for insulin. If you do or don't take insulin: 
· Look at labels on packaged foods. This can tell you how many carbs are in a serving. You can also use guides from the American Diabetes Association. · Be aware of portions, or serving sizes. If a package has two servings and you eat the whole package, you need to double the number of grams of carbohydrate listed for one serving. · Protein, fat, and fiber do not raise blood sugar as much as carbs do. If you eat a lot of these nutrients in a meal, your blood sugar will rise more slowly than it would otherwise. Eat from all food groups · Eat at least three meals a day. · Plan meals to include food from all the food groups. The food groups include grains, fruits, dairy, proteins, and vegetables. · Talk to your dietitian or diabetes educator about ways to add limited amounts of sweets into your meal plan. · If you drink alcohol, talk to your doctor. It may not be recommended when you are taking certain diabetes medicines. Where can you learn more? Go to http://ricardo-rashida.info/. Enter I157 in the search box to learn more about \"Counting Carbohydrates: Care Instructions. \" Current as of: July 25, 2018 Content Version: 11.9 © 0732-1100 [employer]. Care instructions adapted under license by [employer] (which disclaims liability or warranty for this information). If you have questions about a medical condition or this instruction, always ask your healthcare professional. [address] any warranty or liability for your use of this information.

## 2019-06-10 NOTE — PROGRESS NOTES
Jose Luis Jo is a 46 y.o. female (: 1970) presenting to address:    Chief Complaint   Patient presents with    Ankle swelling     happens 2-3 times/week    Medication Evaluation     steroids helped a lot    Back Pain       Vitals:    06/10/19 1317   BP: 106/64   Pulse: (!) 103   Resp: 16   Temp: 97.7 °F (36.5 °C)   TempSrc: Oral   SpO2: 90%   Weight: 209 lb (94.8 kg)   Height: 5' 5.5\" (1.664 m)   PainSc:   6   PainLoc: Generalized       Hearing/Vision:   No exam data present    Learning Assessment:     Learning Assessment 2018   PRIMARY LEARNER Patient   HIGHEST LEVEL OF EDUCATION - PRIMARY LEARNER  DID NOT GRADUATE HIGH SCHOOL   BARRIERS PRIMARY LEARNER NONE   CO-LEARNER CAREGIVER No   PRIMARY LANGUAGE ENGLISH   LEARNER PREFERENCE PRIMARY LISTENING   ANSWERED BY patient   RELATIONSHIP SELF     Depression Screening:     3 most recent PHQ Screens 2018   Little interest or pleasure in doing things Several days   Feeling down, depressed, irritable, or hopeless Several days   Total Score PHQ 2 2     Fall Risk Assessment:     Fall Risk Assessment, last 12 mths 2019   Able to walk? Yes   Fall in past 12 months? No     Abuse Screening:     Abuse Screening Questionnaire 2019   Do you ever feel afraid of your partner? N   Are you in a relationship with someone who physically or mentally threatens you? N   Is it safe for you to go home? Y     Coordination of Care Questionaire:   1. Have you been to the ER, urgent care clinic since your last visit? Hospitalized since your last visit? NO    2. Have you seen or consulted any other health care providers outside of the [address] [place] [address] since your last visit? Include any pap smears or colon screening. YES; sees derm on 19    Advanced Directive:   1. Do you have an Advanced Directive? NO    2. Would you like information on Advanced Directives?  NO

## 2019-06-18 ENCOUNTER — TELEPHONE (OUTPATIENT)
Dept: FAMILY MEDICINE CLINIC | Age: 49
End: 2019-06-18

## 2019-06-18 NOTE — TELEPHONE ENCOUNTER
Patient called and requested a referral for rheumatology to the following:    [facility] [address] [facility], [address]  [phone] phone  [phone] fax

## 2019-07-01 ENCOUNTER — OFFICE VISIT (OUTPATIENT)
Dept: OBGYN CLINIC | Age: 49
End: 2019-07-01

## 2019-07-01 ENCOUNTER — HOSPITAL ENCOUNTER (OUTPATIENT)
Dept: LAB | Age: 49
Discharge: HOME | End: 2019-07-01
Payer: MEDICAID

## 2019-07-01 VITALS
HEIGHT: 65 IN | DIASTOLIC BLOOD PRESSURE: 75 MMHG | HEART RATE: 100 BPM | WEIGHT: 210 LBS | RESPIRATION RATE: 18 BRPM | SYSTOLIC BLOOD PRESSURE: 98 MMHG | BODY MASS INDEX: 34.99 KG/M2

## 2019-07-01 DIAGNOSIS — Z01.419 ENCOUNTER FOR WELL WOMAN EXAM WITH ROUTINE GYNECOLOGICAL EXAM: Primary | ICD-10-CM

## 2019-07-01 PROCEDURE — 87624 HPV HI-RISK TYP POOLED RSLT: CPT

## 2019-07-01 PROCEDURE — 88175 CYTOPATH C/V AUTO FLUID REDO: CPT

## 2019-07-01 NOTE — PROGRESS NOTES
Subjective:   46 y.o. female for Well Woman Check. No LMP recorded. Patient has had an ablation. Social History: single partner, contraception - none. Pertinent past medical hstory: current smoker, no history of HTN, DVT, CAD, DM, liver disease, or migraines. Current Outpatient Medications   Medication Sig Dispense Refill    acetaminophen (TYLENOL EXTRA STRENGTH) 500 mg tablet Take  by mouth every six (6) hours as needed for Pain.  ergocalciferol (ERGOCALCIFEROL) 50,000 unit capsule Take 1 Cap by mouth every seven (7) days. 9 Cap 0    amitriptyline (ELAVIL) 100 mg tablet Take  by mouth nightly.  albuterol (PROVENTIL HFA, VENTOLIN HFA, PROAIR HFA) 90 mcg/actuation inhaler Albuterol inhaler, 2 puffs up to every 4 hours if needed for wheezing, cough, shortness of breath 1 Inhaler 11    fluticasone propionate (FLOVENT HFA) 110 mcg/actuation inhaler Take 2 Puffs by inhalation every twelve (12) hours. 1 Inhaler 11    DULoxetine (CYMBALTA) 60 mg capsule Take 60 mg by mouth daily.  gabapentin (NEURONTIN) 300 mg capsule Take 300 mg by mouth three (3) times daily.  ARIPiprazole (ABILIFY) 10 mg tablet Take 10 mg by mouth daily.  levothyroxine (SYNTHROID) 112 mcg tablet Take  by mouth Daily (before breakfast).  atorvastatin calcium (LIPITOR PO) Take 20 mg by mouth daily.  ALPRAZolam (XANAX) 0.5 mg tablet Take  by mouth.  raNITIdine (ZANTAC) 150 mg tablet Take 1 Tab by mouth two (2) times a day. 180 Tab 1     Allergies   Allergen Reactions    Bactrim [Sulfamethoprim] Unknown (comments)    Bactrim [Sulfamethoprim] Other (comments)     Thrush     Past Medical History:   Diagnosis Date    Breast cyst     Depression     Emphysema lung ([address]     1 year and a half it was mentioned by ER Doc.     Fibromyalgia     Hyperlipidemia     Hypothyroidism     Ovarian cyst      Past Surgical History:   Procedure Laterality Date    BREAST SURGERY PROCEDURE UNLISTED      HX APPENDECTOMY  HX GYN      Cyst, Uterine Ablation    HX TUBAL LIGATION       Family History   Problem Relation Age of Onset    Heart Disease Mother     Heart Disease Father     Heart Disease Sister     MS Sister     Heart Disease Brother         3 heart attacks    Cancer Paternal Aunt         breast    Diabetes Neg Hx     Hypertension Neg Hx      Social History     Tobacco Use    Smoking status: Current Every Day Smoker     Packs/day: 0.50    Smokeless tobacco: Never Used   Substance Use Topics    Alcohol use: Yes     Comment: occ        ROS:  Feeling well. No dyspnea or chest pain on exertion. No abdominal pain, change in bowel habits, black or bloody stools. No urinary tract symptoms. GYN ROS: no breast pain or new or enlarging lumps on self exam, no vaginal bleeding, no discharge or pelvic pain, she complains of hot flashes, night sweats, and weight gain. No neurological complaints. Objective:     Visit Vitals  BP 98/75 (BP 1 Location: Left arm, BP Patient Position: Sitting)   Pulse 100   Resp 18   Ht 5' 5\" (1.651 m)   Wt 210 lb (95.3 kg)   BMI 34.95 kg/m²     The patient appears well, alert, oriented x 3, in no distress. ENT normal.  Neck supple. No adenopathy or thyromegaly. RICHARD. Lungs are clear, good air entry, no wheezes, rhonchi or rales. S1 and S2 normal, no murmurs, regular rate and rhythm. Abdomen soft without tenderness, guarding, mass or organomegaly. Extremities show no edema, normal peripheral pulses. Neurological is normal, no focal findings. BREAST EXAM: breasts appear normal, no suspicious masses, no skin or nipple changes or axillary nodes    PELVIC EXAM: normal external genitalia, vulva, vagina, cervix, uterus and adnexa, exam limited by obesity, PAP: Pap smear done today, HPV test    Assessment/Plan:   well woman  no contraindication to begin hormonal therapy  mammogram  pap smear  return annually or prn  Plan of care discussed. Patient expressed understanding.

## 2019-07-03 ENCOUNTER — TELEPHONE (OUTPATIENT)
Dept: OBGYN CLINIC | Age: 49
End: 2019-07-03

## 2019-07-03 DIAGNOSIS — N60.02 BILATERAL BREAST CYSTS: Primary | ICD-10-CM

## 2019-07-03 DIAGNOSIS — N60.01 BILATERAL BREAST CYSTS: Primary | ICD-10-CM

## 2019-07-03 RX ORDER — ESTRADIOL AND NORETHINDRONE ACETATE .5; .1 MG/1; MG/1
1 TABLET ORAL DAILY
Qty: 90 TAB | Refills: 3 | Status: SHIPPED | OUTPATIENT
Start: 2019-07-03 | End: 2020-05-12

## 2019-07-03 NOTE — TELEPHONE ENCOUNTER
Rx for Activella sent to the patient's pharmacy. I don't know anything about a rheumatology referral- is this message for a different provider?

## 2019-07-03 NOTE — TELEPHONE ENCOUNTER
Patient called stating that she was seen yesterday and prescribed Prempro and was told by Dr. Mino Osuna that her insurance may not cover it and to call back and she would prescribe something else. Patient states that insurance did not cover the above medication.  Please advise

## 2019-07-03 NOTE — TELEPHONE ENCOUNTER
Pt called to say that she found a Rheumotologist that takes her insurance. Eugene Wolff or any other provider  @ [place] [address].  Phone # [phone]

## 2019-07-03 NOTE — TELEPHONE ENCOUNTER
Patient called [facility]ry to schedule her mammogram. Patient informed the order needs to be changed to diagnostic due to cysts in breast tissue. Please fax updated order to [phone].  Apt is scheduled on [email or web address]

## 2019-07-08 ENCOUNTER — OFFICE VISIT (OUTPATIENT)
Dept: FAMILY MEDICINE CLINIC | Age: 49
End: 2019-07-08

## 2019-07-08 ENCOUNTER — TELEPHONE (OUTPATIENT)
Dept: FAMILY MEDICINE CLINIC | Age: 49
End: 2019-07-08

## 2019-07-08 VITALS
RESPIRATION RATE: 14 BRPM | TEMPERATURE: 98.1 F | HEART RATE: 87 BPM | BODY MASS INDEX: 36.49 KG/M2 | DIASTOLIC BLOOD PRESSURE: 76 MMHG | SYSTOLIC BLOOD PRESSURE: 121 MMHG | HEIGHT: 65 IN | OXYGEN SATURATION: 95 % | WEIGHT: 219 LBS

## 2019-07-08 DIAGNOSIS — E66.9 OBESITY (BMI 30-39.9): ICD-10-CM

## 2019-07-08 DIAGNOSIS — M79.7 FIBROMYALGIA: ICD-10-CM

## 2019-07-08 DIAGNOSIS — R60.0 BILATERAL LOWER EXTREMITY EDEMA: Primary | ICD-10-CM

## 2019-07-08 NOTE — TELEPHONE ENCOUNTER
Pt would like to change pcp to Cheryl Weber after seeing her today. Cheryl Weber states that pt needs one month follow up. Pt is more comfortable with female provider.  F/u scheduled for 8/12

## 2019-07-08 NOTE — PROGRESS NOTES
Madison Farah is a 46 y.o. female (: 1970) presenting to address:    Chief Complaint   Patient presents with    Foot Swelling     x 5 days however on & off x couple months     Ankle swelling       Vitals:    19 1200   BP: 121/76   Pulse: 87   Resp: 14   Temp: 98.1 °F (36.7 °C)   TempSrc: Oral   SpO2: 95%   Weight: 219 lb (99.3 kg)   Height: 5' 5\" (1.651 m)   PainSc:   5   PainLoc: Foot       Hearing/Vision:   No exam data present    Learning Assessment:     Learning Assessment 2018   PRIMARY LEARNER Patient   HIGHEST LEVEL OF EDUCATION - PRIMARY LEARNER  DID NOT GRADUATE HIGH SCHOOL   BARRIERS PRIMARY LEARNER NONE   CO-LEARNER CAREGIVER No   PRIMARY LANGUAGE ENGLISH   LEARNER PREFERENCE PRIMARY LISTENING   ANSWERED BY patient   RELATIONSHIP SELF     Depression Screening:     3 most recent PHQ Screens 2018   Little interest or pleasure in doing things Several days   Feeling down, depressed, irritable, or hopeless Several days   Total Score PHQ 2 2     Fall Risk Assessment:     Fall Risk Assessment, last 12 mths 2019   Able to walk? Yes   Fall in past 12 months? No     Abuse Screening:     Abuse Screening Questionnaire 2019   Do you ever feel afraid of your partner? N   Are you in a relationship with someone who physically or mentally threatens you? N   Is it safe for you to go home? Y     Coordination of Care Questionaire:   1. Have you been to the ER, urgent care clinic since your last visit? Hospitalized since your last visit? NO    2. Have you seen or consulted any other health care providers outside of the [address] [place] [address] since your last visit? Include any pap smears or colon screening. YES GYN upstairs. Advanced Directive:   1. Do you have an Advanced Directive? NO    2. Would you like information on Advanced Directives?  NO

## 2019-07-08 NOTE — PATIENT INSTRUCTIONS
[facility]  [address] [facility], [address], [address] [place] [address]  Phone: [phone]  Fax: [phone]    Patient recently saw Nimco Ayers will be starting hormone replacement therapy. Continue with GYN for follow-up on this. See back 4 weeks for follow-up on fluid in bilateral lower extremities. Patient given instruction low-sodium diet, increase water, elevation as needed, and monitor swelling. Use compression stocking during day.

## 2019-07-08 NOTE — PROGRESS NOTES
Apollo Freeman is a 46 y.o.  female and presents with    Chief Complaint   Patient presents with    Foot Swelling     x 5 days however on & off x couple months     Ankle swelling         Subjective:  Patient presents with complaints of foot swelling ankle swelling x5 days however has had intermittent swelling x2 months. Patient states previously elevating her feet in the evenings has helped reduce mild swelling. Patient denies watching diet to include low sodium increasing water or other interventions. Patient recently saw GYN and is starting hormone replacement this week. GYN per patient stated swelling could be associated with some cycle and hormone changes. Current Outpatient Medications   Medication Sig Dispense Refill    estradiol-norethindrone (ACTIVELLA) 0.5-0.1 mg per tablet Take 1 Tab by mouth daily. 90 Tab 3    acetaminophen (TYLENOL EXTRA STRENGTH) 500 mg tablet Take  by mouth every six (6) hours as needed for Pain.  amitriptyline (ELAVIL) 100 mg tablet Take  by mouth nightly.  albuterol (PROVENTIL HFA, VENTOLIN HFA, PROAIR HFA) 90 mcg/actuation inhaler Albuterol inhaler, 2 puffs up to every 4 hours if needed for wheezing, cough, shortness of breath 1 Inhaler 11    fluticasone propionate (FLOVENT HFA) 110 mcg/actuation inhaler Take 2 Puffs by inhalation every twelve (12) hours. 1 Inhaler 11    DULoxetine (CYMBALTA) 60 mg capsule Take 60 mg by mouth daily.  gabapentin (NEURONTIN) 300 mg capsule Take 300 mg by mouth three (3) times daily.  ARIPiprazole (ABILIFY) 10 mg tablet Take 10 mg by mouth daily.  levothyroxine (SYNTHROID) 112 mcg tablet Take  by mouth Daily (before breakfast).  atorvastatin calcium (LIPITOR PO) Take 20 mg by mouth daily.  ALPRAZolam (XANAX) 0.5 mg tablet Take 1 mg by mouth three (3) times daily as needed.  raNITIdine (ZANTAC) 150 mg tablet Take 1 Tab by mouth two (2) times a day.  180 Tab 1    estrogen, conjugated,-medroxyPROGESTERone (PREMPRO) 0.3-1.5 mg tab Take 1 Tab by mouth daily. 90 Tab 3    ergocalciferol (ERGOCALCIFEROL) 50,000 unit capsule Take 1 Cap by mouth every seven (7) days. 9 Cap 0     Allergies   Allergen Reactions    Bactrim [Sulfamethoprim] Unknown (comments)    Bactrim [Sulfamethoprim] Other (comments)     Thrush       Review of Systems   Constitutional: Negative for chills and fever. HENT: Negative. Eyes: Negative for blurred vision. Respiratory: Negative for shortness of breath. Cardiovascular: Positive for leg swelling. Negative for chest pain. Gastrointestinal: Negative for abdominal pain, constipation, diarrhea, nausea and vomiting. Genitourinary: Negative. Musculoskeletal: Positive for joint pain and myalgias. Skin: Negative. Neurological: Negative for headaches. Psychiatric/Behavioral: Negative. Objective:  Vitals:    07/08/19 1200   BP: 121/76   Pulse: 87   Resp: 14   Temp: 98.1 °F (36.7 °C)   TempSrc: Oral   SpO2: 95%   Weight: 219 lb (99.3 kg)   Height: 5' 5\" (1.651 m)   PainSc:   5   PainLoc: Foot     General:   Well-groomed, well-nourished, in no distress, pleasant, alert, appropriate    Cardiovasc:   RRR,  no murmur, rubs or gallops. Pulmonary:    Lungs clear bilaterally, no wheezing, rales or rhonchi. Extremities:   No erythema, deformity,  + to [place] again take something he had Dr. Darlene Pack in the region 3 patient there is less graduate so position  Which she can I make an or TTP,  LEs warm and well-perfused. 1+ PE bilateral lower extremities. Assessment/Plan:      1. Bilateral lower extremity edema  Patient recently saw Odessa Rhodes will be starting hormone replacement therapy. Continue with GYN for follow-up on this. See back 4 weeks for follow-up on fluid in bilateral lower extremities. Can consider prn lasix but for now should follow instructions.  Patient given instruction low-sodium diet, increase water, elevation as needed, watch diet and decrease calories for weight mgmt, and monitor swelling. Use compression stocking during day. 2. Fibromyalgia  Continue with arthritis consultants     3. Obesity (BMI 30-39. 9)  Patient recently saw Jason Urena will be starting hormone replacement therapy. As above work on weight control     I have discussed the diagnosis with the patient and the intended plan as seen in the above orders. The patient has received an after-visit summary and questions were answered concerning future plans. I have discussed medication side effects and warnings with the patient as well. I have reviewed the plan of care with the patient, accepted their input and they are in agreement with the treatment goals. Follow-up and Dispositions    · Return in about 1 month (around 8/5/2019). More than 1/2 of this 15 minute visit was spent in counselling and coordination of care, as described above.     Berna Tavarez [facility]-BC

## 2019-07-08 NOTE — PROGRESS NOTES
Pap letter written  and given to Dr Babita Butler  for signature  and given to her  nurse Conchita Dawson To mail  to mail.

## 2019-07-10 ENCOUNTER — TELEPHONE (OUTPATIENT)
Dept: FAMILY MEDICINE CLINIC | Age: 49
End: 2019-07-10

## 2019-07-10 NOTE — TELEPHONE ENCOUNTER
Discussed with patient a rheumatologist that accepts her insurance:    [facility] & [facility]  [address] [facility] [place], [address] [facility]  [phone]

## 2019-07-10 NOTE — TELEPHONE ENCOUNTER
Pt said the labs, notes and referral could be sent to [address] [place] [address] Rheumatology & Pain Management  [address] [place] [address], [address] [facility]

## 2019-07-10 NOTE — TELEPHONE ENCOUNTER
Faxed referral and office notes to [facility] [place] [facility].

## 2019-07-24 DIAGNOSIS — E55.9 VITAMIN D DEFICIENCY: ICD-10-CM

## 2019-07-26 RX ORDER — ERGOCALCIFEROL 1.25 MG/1
CAPSULE ORAL
Qty: 9 CAP | Refills: 0 | Status: SHIPPED | OUTPATIENT
Start: 2019-07-26 | End: 2020-05-12 | Stop reason: SDUPTHER

## 2019-08-12 ENCOUNTER — OFFICE VISIT (OUTPATIENT)
Dept: FAMILY MEDICINE CLINIC | Age: 49
End: 2019-08-12

## 2019-08-12 VITALS
DIASTOLIC BLOOD PRESSURE: 78 MMHG | TEMPERATURE: 97.3 F | BODY MASS INDEX: 36.32 KG/M2 | HEIGHT: 65 IN | OXYGEN SATURATION: 91 % | SYSTOLIC BLOOD PRESSURE: 118 MMHG | RESPIRATION RATE: 20 BRPM | HEART RATE: 104 BPM | WEIGHT: 218 LBS

## 2019-08-12 DIAGNOSIS — R60.0 BILATERAL LOWER EXTREMITY EDEMA: Primary | ICD-10-CM

## 2019-08-12 DIAGNOSIS — E66.9 OBESITY (BMI 30-39.9): ICD-10-CM

## 2019-08-12 RX ORDER — FUROSEMIDE 20 MG/1
20 TABLET ORAL DAILY
Qty: 30 TAB | Refills: 0 | Status: SHIPPED | OUTPATIENT
Start: 2019-08-12 | End: 2019-09-12 | Stop reason: SDUPTHER

## 2019-08-12 NOTE — PATIENT INSTRUCTIONS
Leg and Ankle Edema: Care Instructions  Your Care Instructions  Swelling in the legs, ankles, and feet is called edema. It is common after you sit or stand for a while. Long plane flights or car rides often cause swelling in the legs and feet. You may also have swelling if you have to stand for long periods of time at your job. Problems with the veins in the legs (varicose veins) and changes in hormones can also cause swelling. Sometimes the swelling in the ankles and feet is caused by a more serious problem, such as heart failure, infection, blood clots, or liver or kidney disease. Follow-up care is a key part of your treatment and safety. Be sure to make and go to all appointments, and call your doctor if you are having problems. It's also a good idea to know your test results and keep a list of the medicines you take. How can you care for yourself at home? · If your doctor gave you medicine, take it as prescribed. Call your doctor if you think you are having a problem with your medicine. · Whenever you are resting, raise your legs up. Try to keep the swollen area higher than the level of your heart. · Take breaks from standing or sitting in one position. ? Walk around to increase the blood flow in your lower legs. ? Move your feet and ankles often while you stand, or tighten and relax your leg muscles. · Wear support stockings. Put them on in the morning, before swelling gets worse. · Eat a balanced diet. Lose weight if you need to. · Limit the amount of salt (sodium) in your diet. Salt holds fluid in the body and may increase swelling. When should you call for help? Call 911 anytime you think you may need emergency care. For example, call if:    · You have symptoms of a blood clot in your lung (called a pulmonary embolism). These may include:  ? Sudden chest pain. ? Trouble breathing. ?  Coughing up blood.    Call your doctor now or seek immediate medical care if:    · You have signs of a blood clot, such as:  ? Pain in your calf, back of the knee, thigh, or groin. ? Redness and swelling in your leg or groin.     · You have symptoms of infection, such as:  ? Increased pain, swelling, warmth, or redness. ? Red streaks or pus. ? A fever.    Watch closely for changes in your health, and be sure to contact your doctor if:    · Your swelling is getting worse.     · You have new or worsening pain in your legs.     · You do not get better as expected. Where can you learn more? Go to http://ricardo-rashida.info/. Enter X893 in the search box to learn more about \"Leg and Ankle Edema: Care Instructions. \"  Current as of: September 23, 2018  Content Version: 12.1  © 3193-9436 [employer]. Care instructions adapted under license by [employer] (which disclaims liability or warranty for this information). If you have questions about a medical condition or this instruction, always ask your healthcare professional. [address] any warranty or liability for your use of this information.

## 2019-08-12 NOTE — PROGRESS NOTES
Rubio Anderson is a 46 y.o. female (: 1970) presenting to address:    Chief Complaint   Patient presents with    Back Pain     patient c/o back, ankle, and foot pain         pain scale 6/10       Vitals:    19 1303   BP: 118/78   Pulse: (!) 104   Resp: 20   Temp: 97.3 °F (36.3 °C)   TempSrc: Oral   SpO2: 91%   Weight: 218 lb (98.9 kg)   Height: 5' 5\" (1.651 m)   PainSc:   6   PainLoc: Back       Hearing/Vision:   No exam data present    Learning Assessment:     Learning Assessment 2018   PRIMARY LEARNER Patient   HIGHEST LEVEL OF EDUCATION - PRIMARY LEARNER  DID NOT GRADUATE HIGH SCHOOL   BARRIERS PRIMARY LEARNER NONE   CO-LEARNER CAREGIVER No   PRIMARY LANGUAGE ENGLISH   LEARNER PREFERENCE PRIMARY LISTENING   ANSWERED BY patient   RELATIONSHIP SELF     Depression Screening:     3 most recent PHQ Screens 2018   Little interest or pleasure in doing things Several days   Feeling down, depressed, irritable, or hopeless Several days   Total Score PHQ 2 2     Fall Risk Assessment:     Fall Risk Assessment, last 12 mths 2019   Able to walk? Yes   Fall in past 12 months? No     Abuse Screening:     Abuse Screening Questionnaire 2019   Do you ever feel afraid of your partner? N   Are you in a relationship with someone who physically or mentally threatens you? N   Is it safe for you to go home? Y     Coordination of Care Questionaire:   1. Have you been to the ER, urgent care clinic since your last visit? Hospitalized since your last visit? NO    2. Have you seen or consulted any other health care providers outside of the [address] [place] [address] since your last visit? Include any pap smears or colon screening. NO    Advanced Directive:   1. Do you have an Advanced Directive? NO    2. Would you like information on Advanced Directives?  NO

## 2019-08-12 NOTE — PROGRESS NOTES
Misael Melgar is a 46 y.o.  female and presents with    Chief Complaint   Patient presents with    Back Pain     patient c/o ankle, and foot pain         pain scale 6/10         Subjective:  Patient presents with continued concerns of bilateral lower extremity swelling. Patient states pain is a 6 out of 10 when swelling is present. Patient has been following low-fat, low-sodium diet and has increased her water intake while trying to walk daily. Patient states mild improvement for 1 to 2 weeks, but after that no noticeable improvement. Current Outpatient Medications   Medication Sig Dispense Refill    furosemide (LASIX) 20 mg tablet Take 1 Tab by mouth daily. 30 Tab 0    VITAMIN D2 50,000 unit capsule TAKE 1 CAPSULE BY MOUTH ONCE A WEEK 9 Cap 0    estradiol-norethindrone (ACTIVELLA) 0.5-0.1 mg per tablet Take 1 Tab by mouth daily. 90 Tab 3    estrogen, conjugated,-medroxyPROGESTERone (PREMPRO) 0.3-1.5 mg tab Take 1 Tab by mouth daily. 90 Tab 3    acetaminophen (TYLENOL EXTRA STRENGTH) 500 mg tablet Take  by mouth every six (6) hours as needed for Pain.  amitriptyline (ELAVIL) 100 mg tablet Take  by mouth nightly.  albuterol (PROVENTIL HFA, VENTOLIN HFA, PROAIR HFA) 90 mcg/actuation inhaler Albuterol inhaler, 2 puffs up to every 4 hours if needed for wheezing, cough, shortness of breath 1 Inhaler 11    fluticasone propionate (FLOVENT HFA) 110 mcg/actuation inhaler Take 2 Puffs by inhalation every twelve (12) hours. 1 Inhaler 11    DULoxetine (CYMBALTA) 60 mg capsule Take 60 mg by mouth daily.  gabapentin (NEURONTIN) 300 mg capsule Take 300 mg by mouth three (3) times daily.  ARIPiprazole (ABILIFY) 10 mg tablet Take 10 mg by mouth daily.  levothyroxine (SYNTHROID) 112 mcg tablet Take  by mouth Daily (before breakfast).  atorvastatin calcium (LIPITOR PO) Take 20 mg by mouth daily.  ALPRAZolam (XANAX) 0.5 mg tablet Take 1 mg by mouth three (3) times daily as needed.  raNITIdine (ZANTAC) 150 mg tablet Take 1 Tab by mouth two (2) times a day. 180 Tab 1     Allergies   Allergen Reactions    Bactrim [Sulfamethoprim] Unknown (comments)    Bactrim [Sulfamethoprim] Other (comments)     Thrush       Review of Systems   Constitutional: Negative for chills and fever. HENT: Negative. Eyes: Negative for blurred vision. Respiratory: Negative for shortness of breath. Cardiovascular: Positive for leg swelling. Negative for chest pain. Gastrointestinal: Negative for abdominal pain, constipation, diarrhea, nausea and vomiting. Genitourinary: Negative. Musculoskeletal: Positive for joint pain. Skin: Negative. Neurological: Negative for headaches. Psychiatric/Behavioral: Negative. Objective:  Vitals:    08/12/19 1303   BP: 118/78   Pulse: (!) 104   Resp: 20   Temp: 97.3 °F (36.3 °C)   TempSrc: Oral   SpO2: 91%   Weight: 218 lb (98.9 kg)   Height: 5' 5\" (1.651 m)   PainSc:   6   PainLoc: Back     General:   Well-groomed, well-nourished, in no distress, pleasant, alert, appropriate    Cardiovasc:   RRR,  no murmur, rubs or gallops. Pulmonary:    Lungs clear bilaterally, no wheezing, rales or rhonchi. Abdomen:   Obese Abdomen soft, normal bowel sounds. No masses, tenderness,    rebound/rigidity or CVA tenderness. No hepatosplenomegaly. Extremities:   No erythema, deformity, LEs warm and well-perfused. + 2 pitting edema, and TTP today on exam.       Assessment/Plan:      1. Bilateral lower extremity edema  We will start Lasix advised patient to do intermittent use 3 days each time to start with a fluid does not improve can change to once daily dosing see back in 2 weeks for laboratory testing. Advised to eat potassium rich diet as she may deplete in this area due to Lasix use. - NT-PRO BNP; Future  - METABOLIC PANEL, BASIC; Future  - CBC W/O DIFF; Future    2. Obesity (BMI 30-39. 9)  Advised weight may play apart in condition to some degree and discussed good diet and exercise regiment for patient. Should continue to focus on weight mgmt for better health.   - METABOLIC PANEL, BASIC; Future  - CBC W/O DIFF; Future    I have discussed the diagnosis with the patient and the intended plan as seen in the above orders. The patient has received an after-visit summary and questions were answered concerning future plans. I have discussed medication side effects and warnings with the patient as well. I have reviewed the plan of care with the patient, accepted their input and they are in agreement with the treatment goals. Follow-up and Dispositions    · Return in about 1 month (around 9/9/2019). More than 1/2 of this 15 minute visit was spent in counselling and coordination of care, as described above.     Dinah MIRAMONTES-BC

## 2019-09-12 ENCOUNTER — HOSPITAL ENCOUNTER (OUTPATIENT)
Dept: LAB | Age: 49
Discharge: HOME | End: 2019-09-12
Payer: MEDICAID

## 2019-09-12 ENCOUNTER — OFFICE VISIT (OUTPATIENT)
Dept: FAMILY MEDICINE CLINIC | Age: 49
End: 2019-09-12

## 2019-09-12 VITALS
OXYGEN SATURATION: 96 % | BODY MASS INDEX: 36.49 KG/M2 | WEIGHT: 219 LBS | HEART RATE: 98 BPM | HEIGHT: 65 IN | SYSTOLIC BLOOD PRESSURE: 125 MMHG | RESPIRATION RATE: 20 BRPM | DIASTOLIC BLOOD PRESSURE: 79 MMHG | TEMPERATURE: 98.8 F

## 2019-09-12 DIAGNOSIS — J02.9 SORE THROAT: ICD-10-CM

## 2019-09-12 DIAGNOSIS — E66.9 OBESITY (BMI 30-39.9): ICD-10-CM

## 2019-09-12 DIAGNOSIS — R60.0 BILATERAL LOWER EXTREMITY EDEMA: ICD-10-CM

## 2019-09-12 DIAGNOSIS — E78.5 HYPERLIPIDEMIA, UNSPECIFIED HYPERLIPIDEMIA TYPE: ICD-10-CM

## 2019-09-12 DIAGNOSIS — Z23 ENCOUNTER FOR IMMUNIZATION: ICD-10-CM

## 2019-09-12 DIAGNOSIS — R60.0 BILATERAL LOWER EXTREMITY EDEMA: Primary | ICD-10-CM

## 2019-09-12 LAB
ANION GAP SERPL CALC-SCNC: 10 MMOL/L (ref 3–18)
BUN SERPL-MCNC: 12 MG/DL (ref 7–18)
BUN/CREAT SERPL: 11 (ref 12–20)
CALCIUM SERPL-MCNC: 10.1 MG/DL (ref 8.5–10.1)
CHLORIDE SERPL-SCNC: 99 MMOL/L (ref 100–111)
CO2 SERPL-SCNC: 27 MMOL/L (ref 21–32)
CREAT SERPL-MCNC: 1.09 MG/DL (ref 0.6–1.3)
ERYTHROCYTE [DISTWIDTH] IN BLOOD BY AUTOMATED COUNT: 13.3 % (ref 11.6–14.5)
GLUCOSE SERPL-MCNC: 112 MG/DL (ref 74–99)
HCT VFR BLD AUTO: 45 % (ref 35–45)
HGB BLD-MCNC: 14.8 G/DL (ref 12–16)
MCH RBC QN AUTO: 31.8 PG (ref 24–34)
MCHC RBC AUTO-ENTMCNC: 32.9 G/DL (ref 31–37)
MCV RBC AUTO: 96.8 FL (ref 74–97)
PLATELET # BLD AUTO: 274 K/UL (ref 135–420)
PMV BLD AUTO: 10.7 FL (ref 9.2–11.8)
POTASSIUM SERPL-SCNC: 4.4 MMOL/L (ref 3.5–5.5)
RBC # BLD AUTO: 4.65 M/UL (ref 4.2–5.3)
SODIUM SERPL-SCNC: 136 MMOL/L (ref 136–145)
TSH SERPL DL<=0.05 MIU/L-ACNC: 1.73 UIU/ML (ref 0.36–3.74)
WBC # BLD AUTO: 7.5 K/UL (ref 4.6–13.2)

## 2019-09-12 PROCEDURE — 80061 LIPID PANEL: CPT

## 2019-09-12 PROCEDURE — 84439 ASSAY OF FREE THYROXINE: CPT

## 2019-09-12 PROCEDURE — 84443 ASSAY THYROID STIM HORMONE: CPT

## 2019-09-12 PROCEDURE — 80048 BASIC METABOLIC PNL TOTAL CA: CPT

## 2019-09-12 PROCEDURE — 85027 COMPLETE CBC AUTOMATED: CPT

## 2019-09-12 PROCEDURE — 82306 VITAMIN D 25 HYDROXY: CPT

## 2019-09-12 PROCEDURE — 36415 COLL VENOUS BLD VENIPUNCTURE: CPT

## 2019-09-12 RX ORDER — FUROSEMIDE 20 MG/1
20 TABLET ORAL DAILY
Qty: 90 TAB | Refills: 3 | Status: SHIPPED | OUTPATIENT
Start: 2019-09-12 | End: 2020-06-17

## 2019-09-12 NOTE — PROGRESS NOTES
Cass Aggarwal is a 46 y.o. female (: 1970) presenting to address:    Chief Complaint   Patient presents with    Medication Evaluation     edema    Sore Throat       Vitals:    19 1432   BP: 125/79   Pulse: 98   Resp: 20   Temp: 98.8 °F (37.1 °C)   TempSrc: Oral   SpO2: 96%   Weight: 219 lb (99.3 kg)   Height: 5' 5\" (1.651 m)   PainSc:   0 - No pain       Hearing/Vision:   No exam data present    Learning Assessment:     Learning Assessment 2018   PRIMARY LEARNER Patient   HIGHEST LEVEL OF EDUCATION - PRIMARY LEARNER  DID NOT GRADUATE HIGH SCHOOL   BARRIERS PRIMARY LEARNER NONE   CO-LEARNER CAREGIVER No   PRIMARY LANGUAGE ENGLISH   LEARNER PREFERENCE PRIMARY LISTENING   ANSWERED BY patient   RELATIONSHIP SELF     Depression Screening:     3 most recent PHQ Screens 2018   Little interest or pleasure in doing things Several days   Feeling down, depressed, irritable, or hopeless Several days   Total Score PHQ 2 2     Fall Risk Assessment:     Fall Risk Assessment, last 12 mths 2019   Able to walk? Yes   Fall in past 12 months? No     Abuse Screening:     Abuse Screening Questionnaire 2019   Do you ever feel afraid of your partner? N   Are you in a relationship with someone who physically or mentally threatens you? N   Is it safe for you to go home? Y     Coordination of Care Questionaire:   1. Have you been to the ER, urgent care clinic since your last visit? Hospitalized since your last visit? NO    2. Have you seen or consulted any other health care providers outside of the [address] [place] [address] since your last visit? Include any pap smears or colon screening. YES Dr. Nika Gusman    Advanced Directive:   1. Do you have an Advanced Directive? NO    2. Would you like information on Advanced Directives? NO    Flu Immunization/s administered 2019 by Sally Taylor with  consent. Patient tolerated procedure well. No reactions noted.

## 2019-09-13 LAB
25(OH)D3 SERPL-MCNC: 29.1 NG/ML (ref 30–100)
CHOLEST SERPL-MCNC: 285 MG/DL
HDLC SERPL-MCNC: 49 MG/DL (ref 40–60)
HDLC SERPL: 5.8 {RATIO} (ref 0–5)
LDLC SERPL CALC-MCNC: 172.2 MG/DL (ref 0–100)
LIPID PROFILE,FLP: ABNORMAL
T4 FREE SERPL-MCNC: 1 NG/DL (ref 0.7–1.5)
TRIGL SERPL-MCNC: 319 MG/DL (ref ?–150)
VLDLC SERPL CALC-MCNC: 63.8 MG/DL

## 2019-09-18 NOTE — PATIENT INSTRUCTIONS
Leg and Ankle Edema: Care Instructions  Your Care Instructions  Swelling in the legs, ankles, and feet is called edema. It is common after you sit or stand for a while. Long plane flights or car rides often cause swelling in the legs and feet. You may also have swelling if you have to stand for long periods of time at your job. Problems with the veins in the legs (varicose veins) and changes in hormones can also cause swelling. Sometimes the swelling in the ankles and feet is caused by a more serious problem, such as heart failure, infection, blood clots, or liver or kidney disease. Follow-up care is a key part of your treatment and safety. Be sure to make and go to all appointments, and call your doctor if you are having problems. It's also a good idea to know your test results and keep a list of the medicines you take. How can you care for yourself at home? · If your doctor gave you medicine, take it as prescribed. Call your doctor if you think you are having a problem with your medicine. · Whenever you are resting, raise your legs up. Try to keep the swollen area higher than the level of your heart. · Take breaks from standing or sitting in one position. ? Walk around to increase the blood flow in your lower legs. ? Move your feet and ankles often while you stand, or tighten and relax your leg muscles. · Wear support stockings. Put them on in the morning, before swelling gets worse. · Eat a balanced diet. Lose weight if you need to. · Limit the amount of salt (sodium) in your diet. Salt holds fluid in the body and may increase swelling. When should you call for help? Call 911 anytime you think you may need emergency care. For example, call if:    · You have symptoms of a blood clot in your lung (called a pulmonary embolism). These may include:  ? Sudden chest pain. ? Trouble breathing. ?  Coughing up blood.    Call your doctor now or seek immediate medical care if:    · You have signs of a blood clot, such as:  ? Pain in your calf, back of the knee, thigh, or groin. ? Redness and swelling in your leg or groin.     · You have symptoms of infection, such as:  ? Increased pain, swelling, warmth, or redness. ? Red streaks or pus. ? A fever.    Watch closely for changes in your health, and be sure to contact your doctor if:    · Your swelling is getting worse.     · You have new or worsening pain in your legs.     · You do not get better as expected. Where can you learn more? Go to http://ricardo-rashida.info/. Enter W232 in the search box to learn more about \"Leg and Ankle Edema: Care Instructions. \"  Current as of: September 23, 2018  Content Version: 12.1  © 3108-9351 [employer]. Care instructions adapted under license by [employer] (which disclaims liability or warranty for this information). If you have questions about a medical condition or this instruction, always ask your healthcare professional. [place] [address] any warranty or liability for your use of this information.

## 2019-09-18 NOTE — PROGRESS NOTES
Overall labs look pretty good, patient has some changes to kidney function mild we will continue to monitor repeat labs in 6 months, cholesterol is elevated need to confirm patient is still taking Lipitor 20 mg daily at bedtime if so will adjust medication to higher dose. If patient is not currently taking medication will restart.

## 2019-09-18 NOTE — PROGRESS NOTES
Marleny Quiñonez is a 46 y.o.  female and presents with    Chief Complaint   Patient presents with    Medication Evaluation     edema    Sore Throat         Subjective:  Patient presents for one-month follow-up medication evaluation on edema. Patient started Lasix and states medication working well fluid significantly reduced at this time. Patient also with sore throat started 1 day ago would like further evaluation. Patient denies Any other upper respiratory symptoms. Current Outpatient Medications   Medication Sig Dispense Refill    furosemide (LASIX) 20 mg tablet Take 1 Tab by mouth daily. 90 Tab 3    VITAMIN D2 50,000 unit capsule TAKE 1 CAPSULE BY MOUTH ONCE A WEEK 9 Cap 0    estradiol-norethindrone (ACTIVELLA) 0.5-0.1 mg per tablet Take 1 Tab by mouth daily. 90 Tab 3    estrogen, conjugated,-medroxyPROGESTERone (PREMPRO) 0.3-1.5 mg tab Take 1 Tab by mouth daily. 90 Tab 3    acetaminophen (TYLENOL EXTRA STRENGTH) 500 mg tablet Take  by mouth every six (6) hours as needed for Pain.  amitriptyline (ELAVIL) 100 mg tablet Take  by mouth nightly.  albuterol (PROVENTIL HFA, VENTOLIN HFA, PROAIR HFA) 90 mcg/actuation inhaler Albuterol inhaler, 2 puffs up to every 4 hours if needed for wheezing, cough, shortness of breath 1 Inhaler 11    fluticasone propionate (FLOVENT HFA) 110 mcg/actuation inhaler Take 2 Puffs by inhalation every twelve (12) hours. 1 Inhaler 11    DULoxetine (CYMBALTA) 60 mg capsule Take 60 mg by mouth daily.  gabapentin (NEURONTIN) 300 mg capsule Take 300 mg by mouth three (3) times daily.  ARIPiprazole (ABILIFY) 10 mg tablet Take 10 mg by mouth daily.  levothyroxine (SYNTHROID) 112 mcg tablet Take  by mouth Daily (before breakfast).  atorvastatin calcium (LIPITOR PO) Take 20 mg by mouth daily.  ALPRAZolam (XANAX) 0.5 mg tablet Take 2 mg by mouth three (3) times daily as needed.  Indications: patient taking 2 mg as needed      raNITIdine (ZANTAC) 150 mg tablet Take 1 Tab by mouth two (2) times a day. 180 Tab 1     Allergies   Allergen Reactions    Bactrim [Sulfamethoprim] Unknown (comments)    Bactrim [Sulfamethoprim] Other (comments)     Thrush       Review of Systems   Constitutional: Negative for chills and fever. HENT: Positive for sore throat. Eyes: Negative for blurred vision. Respiratory: Negative for shortness of breath. Cardiovascular: Positive for leg swelling. Negative for chest pain. Gastrointestinal: Negative for abdominal pain, constipation, diarrhea, nausea and vomiting. Neurological: Negative for headaches. Objective:  Vitals:    09/12/19 1432   BP: 125/79   Pulse: 98   Resp: 20   Temp: 98.8 °F (37.1 °C)   TempSrc: Oral   SpO2: 96%   Weight: 219 lb (99.3 kg)   Height: 5' 5\" (1.651 m)   PainSc:   0 - No pain     General:   Well-groomed, well-nourished, in no distress, pleasant, alert, appropriate    Throat:  No erythema or exudate  Cardiovasc:   RRR,  no murmur, rubs or gallops. Pulmonary:    Lungs clear bilaterally, no wheezing, rales or rhonchi. Abdomen:   Abdomen soft, normal bowel sounds. No masses, tenderness,    rebound/rigidity or CVA tenderness. No hepatosplenomegaly. Extremities:   No erythema, deformity, or TTP,  LEs warm and well-perfused. + trace edema bilateral pitting      Assessment/Plan:      1. Bilateral lower extremity edema  Continue Lasix for now no change    2. Encounter for immunization  - INFLUENZA VIRUS VAC QUAD,SPLIT,PRESV FREE SYRINGE IM    3. Hyperlipidemia, unspecified hyperlipidemia type  Labs will call with results    4. Sore throat  No findings on exam today recommend gargling salt water 3 times a day, throat lozenges as needed return to care if worsens. I have discussed the diagnosis with the patient and the intended plan as seen in the above orders. The patient has received an after-visit summary and questions were answered concerning future plans.   I have discussed medication side effects and warnings with the patient as well. I have reviewed the plan of care with the patient, accepted their input and they are in agreement with the treatment goals. More than 1/2 of this 15 minute visit was spent face to face in counselling and coordination of care, as described above. This document may have been created with the aid of dictation software. Text may contain errors, particularly phonetic errors.      [facility]

## 2019-09-19 RX ORDER — ATORVASTATIN CALCIUM 40 MG/1
40 TABLET, FILM COATED ORAL DAILY
Qty: 90 TAB | Refills: 0 | Status: SHIPPED | OUTPATIENT
Start: 2019-09-19 | End: 2020-03-03

## 2019-10-06 DIAGNOSIS — K21.9 GASTROESOPHAGEAL REFLUX DISEASE, ESOPHAGITIS PRESENCE NOT SPECIFIED: ICD-10-CM

## 2019-10-08 RX ORDER — RANITIDINE 150 MG/1
TABLET, FILM COATED ORAL
Qty: 180 TAB | Refills: 1 | Status: SHIPPED | OUTPATIENT
Start: 2019-10-08 | End: 2020-03-03

## 2019-10-22 ENCOUNTER — APPOINTMENT (OUTPATIENT)
Dept: PHYSICAL THERAPY | Age: 49
End: 2019-10-22

## 2019-10-25 ENCOUNTER — APPOINTMENT (OUTPATIENT)
Dept: PHYSICAL THERAPY | Age: 49
End: 2019-10-25

## 2019-10-29 ENCOUNTER — HOSPITAL ENCOUNTER (OUTPATIENT)
Dept: PHYSICAL THERAPY | Age: 49
Discharge: HOME | End: 2019-10-29
Payer: MEDICAID

## 2019-10-29 PROCEDURE — 97162 PT EVAL MOD COMPLEX 30 MIN: CPT

## 2019-10-29 PROCEDURE — 97140 MANUAL THERAPY 1/> REGIONS: CPT

## 2019-10-29 NOTE — PROGRESS NOTES
[address] [place] [address], [address] - Phone: [phone]  Fax: [phone] OF CARE / [address] [facility]  Patient Name: Norman Luke : 1970   Medical   Diagnosis: Low back pain [M54.5] Treatment Diagnosis: Low back pain [M54.5]   Onset Date: ~1yr ago     Referral Source: Laz Aragon MD Start of Care [facility]): 10/29/2019   Prior Hospitalization: See medical history Provider #: [id number]   Prior Level of Function: Pain free standing   Comorbidities: Fibromyalgia, depression, OA, Thyroid Problem   Medications: Verified on Patient Summary List   The Plan of Care and following information is based on the information from the initial evaluation.   ===========================================================================================  Assessment / key information:  Norman Luke is a 44 y.o.  yo female with Dx of Low back pain [M54.5]. She reports insidious onset of mid to lower back pain ~1yr ago. She currently rates her pain as 7/10 at worst, 3/10 at best, primarily located at lower thoracic to lower lumbar region. She complains of difficulty and increase pain with prolonged standing. Objective Findings:  Lumbar ROM: Flx  = limited by 30%, Ext = limited by 50%, Rot: R = limited by 50%, L = limited by 20%. Manual Muscle Testing: L hip abd is Reduced. All other LE strength are WNL. Lumbar/SI Screening:  R iliosacral ant innominate, L3-5 L FRS noted.   Pt instructed in HEP and will f/u in clinic for PT.  ===========================================================================================  Eval Complexity: History HIGH Complexity :3+ comorbidities / personal factors will impact the outcome/ POC ;  Examination  MEDIUM Complexity : 3 Standardized tests and measures addressing body structure, function, activity limitation and / or participation in recreation ; Presentation MEDIUM Complexity : Evolving with changing characteristics ; Decision Making MEDIUM Complexity : FOTO score of 26-74; Overall Complexity MEDIUM  Problem List: pain affecting function, decrease ROM, decrease strength, decrease ADL/ functional abilitiies, decrease activity tolerance, decrease flexibility/ joint mobility and decrease transfer abilities   Treatment Plan may include any combination of the following: Therapeutic exercise, Therapeutic activities, Neuromuscular re-education, Physical agent/modality, Manual therapy, Patient education, Self Care training and Functional mobility training  Patient / Family readiness to learn indicated by: asking questions  Persons(s) to be included in education: patient (P)  Barriers to Learning/Limitations: None  Measures taken, if barriers to learning:    Patient Goal (s): Decrease pain    Patient self reported health status: fair  Rehabilitation Potential: fair     Short Term Goals: To be accomplished in  1-2  weeks:  1. Independent with HEP. 2. Decrease max pain 25-50% to assist with ADLs   Long Term Goals: To be accomplished in  3-4  weeks:  1. Decrease max pain 50-75% to assist with ADLs  2. Increase FOTO score to 50% to show functional improvment. 3.  Will rate  >/= +5 on Global Rating of Change and be prepared to DC to HEP. Frequency / Duration:   Patient to be seen  2-3  times per week for 3-4  weeks:  Patient / Caregiver education and instruction: self care and exercises    Therapist Signature: Paige Vasquez, BRENDEN, OCS, SCS, CSCS Date: 86/51/0187   Certification Period: na Time: 11:59 AM   ===========================================================================================  I certify that the above Physical Therapy Services are being furnished while the patient is under my care. I agree with the treatment plan and certify that this therapy is necessary.     Physician Signature:        Date:       Time:     Please sign and return to [facility] or you may fax the signed copy to [phone]. Thank you.

## 2019-11-06 ENCOUNTER — HOSPITAL ENCOUNTER (OUTPATIENT)
Dept: PHYSICAL THERAPY | Age: 49
Discharge: HOME | End: 2019-11-06
Payer: MEDICAID

## 2019-11-06 PROCEDURE — 97110 THERAPEUTIC EXERCISES: CPT

## 2019-11-06 PROCEDURE — 97140 MANUAL THERAPY 1/> REGIONS: CPT

## 2019-11-06 NOTE — PROGRESS NOTES
PHYSICAL THERAPY - DAILY TREATMENT NOTE    Patient Name: Shannon Montgomery        Date: 2019  : 1970   yes Patient  Verified  Visit #:   2   of   12  Insurance: Payor: Rabia Montez / Plan: [facility] [employer] / Product Type: Managed Care Medicaid /      In time: 2:28 PM Out time: 3:06 PM   Total Treatment Time: 38     Medicare/BCBS Time Tracking (below)   Total Timed Codes (min):  n/a 1:1 Treatment Time:  n/a     TREATMENT AREA =  Low back pain [M54.5]    SUBJECTIVE  Pain Level (on 0 to 10 scale):  6  / 10   Medication Changes/New allergies or changes in medical history, any new surgeries or procedures?    no  If yes, update Summary List   Subjective Functional Status/Changes:  []  No changes reported     Patient reports having to do a lot of sweeping and vacuuming a few hours before coming in because her pet pig made a mess. OBJECTIVE    23 min Therapeutic Exercise:  [x]  See flow sheet   Rationale:      increase ROM and increase strength to improve the patients ability to perform prolonged walking activities. 15 min Manual Therapy: MET correction for R anterior innominate; DTM to L/S and T/S paraspinals   Rationale:      decrease pain, increase ROM and increase tissue extensibility to improve patient's ability to perform prolonged standing activities. Billed With/As:   [x] TE   [] TA   [] Neuro   [] Self Care Patient Education: [x] Review HEP    [] Progressed/Changed HEP based on:   [] positioning   [] body mechanics   [] transfers   [] heat/ice application    [] other:      Other Objective/Functional Measures:    Initiated therex per POC to improve spinal mobility and glute/core stability for functional ADLs. Req'd cues 100 % of therex for proper form/technique due to 1st F/U appt. Challenged with glute and TA activation during therex. Increase TTP along R>L L/S paraspinals and upper glutes during MT.       Post Treatment Pain Level (on 0 to 10) scale:   5  / 10 ASSESSMENT  Assessment/Changes in Function:     Patient noted minimal reduction in pain level following PT session indicating fair tolerance with initiation of PT interventions. []  See Progress Note/Recertification   Patient will continue to benefit from skilled PT services to modify and progress therapeutic interventions, address functional mobility deficits, address ROM deficits, address strength deficits, analyze and address soft tissue restrictions, analyze and cue movement patterns, analyze and modify body mechanics/ergonomics and assess and modify postural abnormalities to attain remaining goals. Progress toward goals / Updated goals: · Short Term Goals: To be accomplished in  1-2  weeks:  1. Independent with HEP. 2. Decrease max pain 25-50% to assist with ADLs  · Long Term Goals: To be accomplished in  3-4  weeks:  1. Decrease max pain 50-75% to assist with ADLs  2. Increase FOTO score to 50% to show functional improvment. 3.  Will rate  >/= +5 on Global Rating of Change and be prepared to DC to HEP.     No significant progress towards PT goals due to 1st F/U appt     PLAN  [x]  Upgrade activities as tolerated yes Continue plan of care   []  Discharge due to :    []  Other:      Therapist: NASIM Alva    Date: 11/6/2019 Time: 6:25 PM     Future Appointments   Date Time Provider Erica Barron   11/6/2019  2:30 PM [address] [facility] [address]   11/13/2019  2:30 PM Chiquita Berumen [facility]   11/20/2019  2:30 PM Marcos Gay [facility]   11/27/2019  2:30 PM John Gayvard [facility]   12/12/2019  2:30 PM Lance Paulino NP [facility]

## 2019-11-13 ENCOUNTER — HOSPITAL ENCOUNTER (OUTPATIENT)
Dept: PHYSICAL THERAPY | Age: 49
End: 2019-11-13
Payer: MEDICAID

## 2019-11-20 ENCOUNTER — HOSPITAL ENCOUNTER (OUTPATIENT)
Dept: PHYSICAL THERAPY | Age: 49
Discharge: HOME | End: 2019-11-20
Payer: MEDICAID

## 2019-11-20 PROCEDURE — 97110 THERAPEUTIC EXERCISES: CPT

## 2019-11-20 NOTE — PROGRESS NOTES
PHYSICAL THERAPY - DAILY TREATMENT NOTE    Patient Name: Steven Jacobo        Date: 2019  : 1970   yes Patient  Verified  Visit #:   3   of   12  Insurance: Payor: Anthony Tor / Plan: [facility] CCCP / Product Type: Managed Care Medicaid /      In time: 2:23 PM Out time: 3:01 PM   Total Treatment Time: 38     Medicare/BCBS Time Tracking (below)   Total Timed Codes (min):  n/a 1:1 Treatment Time:  n/a     TREATMENT AREA =  Low back pain [M54.5]    SUBJECTIVE  Pain Level (on 0 to 10 scale):  6  / 10   Medication Changes/New allergies or changes in medical history, any new surgeries or procedures?    no  If yes, update Summary List   Subjective Functional Status/Changes:  []  No changes reported     Patient reports no new complaints of pain. States her back is acting up more today because of the weather. OBJECTIVE    38 min Therapeutic Exercise:  [x]  See flow sheet   Rationale:      increase ROM and increase strength to improve the patients ability to perform prolonged walking activities. held min Manual Therapy: MET correction for R anterior innominate; DTM to L/S and T/S paraspinals   Rationale:      decrease pain, increase ROM and increase tissue extensibility to improve patient's ability to perform prolonged standing activities. Billed With/As:   [x] TE   [] TA   [] Neuro   [] Self Care Patient Education: [x] Review HEP    [] Progressed/Changed HEP based on:   [] positioning   [] body mechanics   [] transfers   [] heat/ice application    [] other:      Other Objective/Functional Measures:    Issued and reviewed HEP. Continued therex per flowsheet. Post Treatment Pain Level (on 0 to 10) scale:    / 10     ASSESSMENT  Assessment/Changes in Function:     Challenged with R LE strengthening therex.   Able to reduce pain level following PT session      []  See Progress Note/Recertification   Patient will continue to benefit from skilled PT services to modify and progress therapeutic interventions, address functional mobility deficits, address ROM deficits, address strength deficits, analyze and address soft tissue restrictions, analyze and cue movement patterns, analyze and modify body mechanics/ergonomics and assess and modify postural abnormalities to attain remaining goals. Progress toward goals / Updated goals: · Short Term Goals: To be accomplished in  1-2  weeks:  1. Independent with HEP. Progressing 11/20; HEP established   2. Decrease max pain 25-50% to assist with ADLs  · Long Term Goals: To be accomplished in  3-4  weeks:  1. Decrease max pain 50-75% to assist with ADLs  2. Increase FOTO score to 50% to show functional improvment. 3.  Will rate  >/= +5 on Global Rating of Change and be prepared to DC to HEP.        PLAN  [x]  Upgrade activities as tolerated yes Continue plan of care   []  Discharge due to :    []  Other:      Therapist: NASIM Ramirez    Date: 11/20/2019 Time: 6:27 PM     Future Appointments   Date Time Provider Erica Barron   11/20/2019  2:30 PM Leatha Gay [facility]   11/27/2019  2:30 PM John Gay [facility]   12/12/2019  2:30 PM Anita Ziegler NP [facility]

## 2019-11-27 ENCOUNTER — APPOINTMENT (OUTPATIENT)
Dept: PHYSICAL THERAPY | Age: 49
End: 2019-11-27
Payer: MEDICAID

## 2019-12-03 ENCOUNTER — TELEPHONE (OUTPATIENT)
Dept: FAMILY MEDICINE CLINIC | Age: 49
End: 2019-12-03

## 2019-12-03 NOTE — TELEPHONE ENCOUNTER
Patient states that she has been taking ranitidine but it has been recalled. Patient would like to know if something different can be sent in the pharmacy. Please advise.

## 2019-12-04 RX ORDER — PHENOL/SODIUM PHENOLATE
20 AEROSOL, SPRAY (ML) MUCOUS MEMBRANE DAILY
Qty: 90 TAB | Refills: 0 | Status: SHIPPED | OUTPATIENT
Start: 2019-12-04 | End: 2020-03-03

## 2019-12-12 ENCOUNTER — OFFICE VISIT (OUTPATIENT)
Dept: FAMILY MEDICINE CLINIC | Age: 49
End: 2019-12-12

## 2019-12-12 VITALS
RESPIRATION RATE: 20 BRPM | TEMPERATURE: 97.8 F | OXYGEN SATURATION: 95 % | BODY MASS INDEX: 36.49 KG/M2 | HEIGHT: 65 IN | HEART RATE: 109 BPM | SYSTOLIC BLOOD PRESSURE: 144 MMHG | DIASTOLIC BLOOD PRESSURE: 94 MMHG | WEIGHT: 219 LBS

## 2019-12-12 DIAGNOSIS — R53.82 CHRONIC FATIGUE: Primary | ICD-10-CM

## 2019-12-12 DIAGNOSIS — M79.7 FIBROMYALGIA: ICD-10-CM

## 2019-12-12 NOTE — PROGRESS NOTES
Jeniffer Diaz is a 44 y.o. female (: 1970) presenting to address:    Chief Complaint   Patient presents with    Follow-up    Fatigue       Vitals:    19 1133   BP: (!) 144/94   Pulse: (!) 109   Resp: 20   Temp: 97.8 °F (36.6 °C)   TempSrc: Oral   SpO2: 95%   Weight: 219 lb (99.3 kg)   Height: 5' 5\" (1.651 m)   PainSc:   8   PainLoc: Generalized       Hearing/Vision:   No exam data present    Learning Assessment:     Learning Assessment 2018   PRIMARY LEARNER Patient   HIGHEST LEVEL OF EDUCATION - PRIMARY LEARNER  DID NOT GRADUATE HIGH SCHOOL   BARRIERS PRIMARY LEARNER NONE   CO-LEARNER CAREGIVER No   PRIMARY LANGUAGE ENGLISH   LEARNER PREFERENCE PRIMARY LISTENING   ANSWERED BY patient   RELATIONSHIP SELF     Depression Screening:     3 most recent PHQ Screens 2018   Little interest or pleasure in doing things Several days   Feeling down, depressed, irritable, or hopeless Several days   Total Score PHQ 2 2     Fall Risk Assessment:     Fall Risk Assessment, last 12 mths 2019   Able to walk? Yes   Fall in past 12 months? No     Abuse Screening:     Abuse Screening Questionnaire 2019   Do you ever feel afraid of your partner? N   Are you in a relationship with someone who physically or mentally threatens you? N   Is it safe for you to go home? Y     Coordination of Care Questionaire:   1. Have you been to the ER, urgent care clinic since your last visit? Hospitalized since your last visit? NO    2. Have you seen or consulted any other health care providers outside of the [address] [place] [address] since your last visit? Include any pap smears or colon screening. YES physical therapy, psychiatrist    Advanced Directive:   1. Do you have an Advanced Directive? NO    2. Would you like information on Advanced Directives?  NO

## 2019-12-17 NOTE — PROGRESS NOTES
Steven Jacobo is a 44 y.o.  female and presents with    Chief Complaint   Patient presents with    Follow-up    Fatigue         Subjective:  Patient presents for follow-up on chronic fatigue. Patient was wanting to discuss whether or not additional labs should be completed or if there is any thing related to her chronic fatigue that can be addressed. Patient does have a rheumatologist was recently seen and had lab work approximately 3 months ago. Patient has a diagnosis of chronic arthritis, back pain, fibromyalgia. Patient also has DX of depression. Current Outpatient Medications   Medication Sig Dispense Refill    vortioxetine (TRINTELLIX) 5 mg tablet Take  by mouth daily.  atorvastatin (LIPITOR) 40 mg tablet Take 1 Tab by mouth daily. 90 Tab 0    acetaminophen (TYLENOL EXTRA STRENGTH) 500 mg tablet Take  by mouth every six (6) hours as needed for Pain.  amitriptyline (ELAVIL) 100 mg tablet Take  by mouth nightly.  albuterol (PROVENTIL HFA, VENTOLIN HFA, PROAIR HFA) 90 mcg/actuation inhaler Albuterol inhaler, 2 puffs up to every 4 hours if needed for wheezing, cough, shortness of breath 1 Inhaler 11    DULoxetine (CYMBALTA) 60 mg capsule Take 60 mg by mouth daily.  gabapentin (NEURONTIN) 300 mg capsule Take 300 mg by mouth three (3) times daily.  levothyroxine (SYNTHROID) 112 mcg tablet Take  by mouth Daily (before breakfast).  ALPRAZolam (XANAX) 0.5 mg tablet Take 2 mg by mouth three (3) times daily as needed. Indications: patient taking 2 mg as needed      Omeprazole delayed release (PRILOSEC D/R) 20 mg tablet Take 1 Tab by mouth daily. 90 Tab 0    raNITIdine (ZANTAC) 150 mg tablet TAKE 1 TABLET BY MOUTH TWICE DAILY 180 Tab 1    furosemide (LASIX) 20 mg tablet Take 1 Tab by mouth daily. 90 Tab 3    VITAMIN D2 50,000 unit capsule TAKE 1 CAPSULE BY MOUTH ONCE A WEEK 9 Cap 0    estradiol-norethindrone (ACTIVELLA) 0.5-0.1 mg per tablet Take 1 Tab by mouth daily.  [address] Tab 3    estrogen, conjugated,-medroxyPROGESTERone (PREMPRO) 0.3-1.5 mg tab Take 1 Tab by mouth daily. 90 Tab 3    fluticasone propionate (FLOVENT HFA) 110 mcg/actuation inhaler Take 2 Puffs by inhalation every twelve (12) hours. 1 Inhaler 11    ARIPiprazole (ABILIFY) 10 mg tablet Take 10 mg by mouth daily. Allergies   Allergen Reactions    Bactrim [Sulfamethoprim] Unknown (comments)    Bactrim [Sulfamethoprim] Other (comments)     Thrush       Review of Systems   Constitutional: Positive for malaise/fatigue. Musculoskeletal: Positive for joint pain and myalgias. Objective:  Vitals:    12/12/19 1133   BP: (!) 144/94   Pulse: (!) 109   Resp: 20   Temp: 97.8 °F (36.6 °C)   TempSrc: Oral   SpO2: 95%   Weight: 219 lb (99.3 kg)   Height: 5' 5\" (1.651 m)   PainSc:   8   PainLoc: Generalized     General:   Well-groomed, well-nourished, in no distress, pleasant, alert, appropriate    Cardiovasc:   RRR,  no murmur, rubs or gallops. Pulmonary:    Lungs clear bilaterally, no wheezing, rales or rhonchi. MSK:   limited range of motion, myalgia, tenderness in all joint spaces      Assessment/Plan:      1. Chronic fatigue  Advised patient to go to rheumatology again and ask for complete work-up or review of labs for her chronic fatigue also advised patient that many of the medications she takes as well as some of the diagnoses she has can cause chronic fatigue and this may be ongoing for her and unable to curb the issue easily    2. Fibromyalgia  As above    I have discussed the diagnosis with the patient and the intended plan as seen in the above orders. The patient has received an after-visit summary and questions were answered concerning future plans. I have discussed medication side effects and warnings with the patient as well. I have reviewed the plan of care with the patient, accepted their input and they are in agreement with the treatment goals.      Follow-up and Dispositions    · Return in about 6 months (around 6/12/2020). More than 1/2 of this 15 minute visit was spent face to face in counselling and coordination of care, as described above. This document may have been created with the aid of dictation software. Text may contain errors, particularly phonetic errors.      Cydney Thompson [facility]

## 2019-12-17 NOTE — PATIENT INSTRUCTIONS
Advised patient to go to rheumatology again and ask for complete work-up or review of labs for her chronic fatigue also advised patient that many of the medications she takes as well as some of the diagnoses she has can cause chronic fatigue and this may be ongoing for her and unable to curb the issue easily

## 2019-12-24 NOTE — PROGRESS NOTES
[facility] PHYSICAL THERAPY  [address] [place] [address] 201,[facility], [address] [facility] - Phone: [phone]  Fax: [phone]    DISCHARGE NOTE  Patient Name: Jeniffer Diaz : 1970   Treatment/Medical Diagnosis: Low back pain [M54.5]   Referral Source: Zahira Roy MD     Date of Initial Visit: 10/29/19 Attended Visits: 3 Missed Visits: 1       SUMMARY OF TREATMENT  Pt attended only initial evaluation and     2    follow-ups and then did not return. Therefore a formal reassessment of goals was not performed. RECOMMENDATIONS  Discontinue physical therapy due to patient not returning. If you have any questions/comments please contact us directly at [phone]. Thank you for allowing us to assist in the care of your patient. Therapist Signature: NASIM Nicole/Jadiel Logan, PT, OCS, CSCS Date: 19     Time: 8:59 AM     NOTE TO PHYSICIAN:  Your patient's insurance requires this discharge note be signed and returned. PLEASE COMPLETE THE ORDERS BELOW AND RETURN TO:  [facility] PHYSICAL THERAPY    ___ I have read the above report and request that my patient be discharged from therapy.      Physician Signature:        Date:       Time:

## 2020-02-28 DIAGNOSIS — K21.9 GASTROESOPHAGEAL REFLUX DISEASE, ESOPHAGITIS PRESENCE NOT SPECIFIED: Primary | ICD-10-CM

## 2020-02-28 DIAGNOSIS — E78.5 HYPERLIPIDEMIA, UNSPECIFIED HYPERLIPIDEMIA TYPE: ICD-10-CM

## 2020-03-03 RX ORDER — OMEPRAZOLE 20 MG/1
CAPSULE, DELAYED RELEASE ORAL
Qty: 30 CAP | Refills: 0 | Status: SHIPPED | OUTPATIENT
Start: 2020-03-03 | End: 2020-05-11

## 2020-03-03 RX ORDER — ATORVASTATIN CALCIUM 40 MG/1
TABLET, FILM COATED ORAL
Qty: 30 TAB | Refills: 0 | Status: SHIPPED | OUTPATIENT
Start: 2020-03-03 | End: 2020-05-12 | Stop reason: SDUPTHER

## 2020-03-03 NOTE — TELEPHONE ENCOUNTER
Last OV 12/12/2019    Last refill Prilosec 12/04/2019 qty 90 with 0 refills  Last refill Lipitor 09/19/2019 qty 90 with 0 refills    No future appointments. DAKOTA Moran is no longer practicing.   Pt to re-establish care for further refills

## 2020-03-04 ENCOUNTER — TELEPHONE (OUTPATIENT)
Dept: FAMILY MEDICINE CLINIC | Age: 50
End: 2020-03-04

## 2020-05-05 DIAGNOSIS — K21.9 GASTROESOPHAGEAL REFLUX DISEASE, ESOPHAGITIS PRESENCE NOT SPECIFIED: ICD-10-CM

## 2020-05-05 DIAGNOSIS — E78.5 HYPERLIPIDEMIA, UNSPECIFIED HYPERLIPIDEMIA TYPE: ICD-10-CM

## 2020-05-11 NOTE — TELEPHONE ENCOUNTER
Future Appointments   Date Time Provider Erica Barron   5/12/2020  1:00 PM Aaliyah Byrnes MD [facility]

## 2020-05-12 ENCOUNTER — VIRTUAL VISIT (OUTPATIENT)
Dept: FAMILY MEDICINE CLINIC | Age: 50
End: 2020-05-12

## 2020-05-12 ENCOUNTER — TELEPHONE (OUTPATIENT)
Dept: FAMILY MEDICINE CLINIC | Age: 50
End: 2020-05-12

## 2020-05-12 DIAGNOSIS — F32.1 CURRENT MODERATE EPISODE OF MAJOR DEPRESSIVE DISORDER, UNSPECIFIED WHETHER RECURRENT (HCC): ICD-10-CM

## 2020-05-12 DIAGNOSIS — E55.9 VITAMIN D DEFICIENCY: ICD-10-CM

## 2020-05-12 DIAGNOSIS — F41.0 PANIC ATTACK: ICD-10-CM

## 2020-05-12 DIAGNOSIS — J43.2 CENTRILOBULAR EMPHYSEMA (HCC): ICD-10-CM

## 2020-05-12 DIAGNOSIS — E78.5 HYPERLIPIDEMIA, UNSPECIFIED HYPERLIPIDEMIA TYPE: ICD-10-CM

## 2020-05-12 DIAGNOSIS — R53.82 CHRONIC FATIGUE: ICD-10-CM

## 2020-05-12 DIAGNOSIS — M79.7 FIBROMYALGIA: ICD-10-CM

## 2020-05-12 DIAGNOSIS — E66.9 OBESITY (BMI 30-39.9): ICD-10-CM

## 2020-05-12 DIAGNOSIS — E03.9 ACQUIRED HYPOTHYROIDISM: Primary | ICD-10-CM

## 2020-05-12 DIAGNOSIS — K21.9 GASTROESOPHAGEAL REFLUX DISEASE, ESOPHAGITIS PRESENCE NOT SPECIFIED: ICD-10-CM

## 2020-05-12 DIAGNOSIS — F41.1 GAD (GENERALIZED ANXIETY DISORDER): ICD-10-CM

## 2020-05-12 DIAGNOSIS — F43.10 PTSD (POST-TRAUMATIC STRESS DISORDER): ICD-10-CM

## 2020-05-12 RX ORDER — ALBUTEROL SULFATE 90 UG/1
AEROSOL, METERED RESPIRATORY (INHALATION)
Qty: 1 INHALER | Refills: 11 | Status: SHIPPED | OUTPATIENT
Start: 2020-05-12 | End: 2022-01-05 | Stop reason: SDUPTHER

## 2020-05-12 RX ORDER — ERGOCALCIFEROL 1.25 MG/1
50000 CAPSULE ORAL
Qty: 12 CAP | Refills: 0 | Status: SHIPPED | OUTPATIENT
Start: 2020-05-12 | End: 2020-08-27 | Stop reason: SDUPTHER

## 2020-05-12 RX ORDER — OMEPRAZOLE 20 MG/1
CAPSULE, DELAYED RELEASE ORAL
Qty: 90 CAP | Refills: 1 | Status: SHIPPED | OUTPATIENT
Start: 2020-05-12 | End: 2020-10-13

## 2020-05-12 RX ORDER — LEVOTHYROXINE SODIUM 112 UG/1
112 TABLET ORAL
Qty: 90 TAB | Refills: 0 | Status: SHIPPED | OUTPATIENT
Start: 2020-05-12 | End: 2020-08-07 | Stop reason: DRUGHIGH

## 2020-05-12 RX ORDER — FLUTICASONE PROPIONATE 110 UG/1
2 AEROSOL, METERED RESPIRATORY (INHALATION) EVERY 12 HOURS
Qty: 1 INHALER | Refills: 11 | Status: SHIPPED | OUTPATIENT
Start: 2020-05-12 | End: 2022-01-05 | Stop reason: SDUPTHER

## 2020-05-12 RX ORDER — ATORVASTATIN CALCIUM 40 MG/1
TABLET, FILM COATED ORAL
Qty: 90 TAB | Refills: 1 | OUTPATIENT
Start: 2020-05-12

## 2020-05-12 RX ORDER — ATORVASTATIN CALCIUM 40 MG/1
40 TABLET, FILM COATED ORAL DAILY
Qty: 90 TAB | Refills: 0 | Status: SHIPPED | OUTPATIENT
Start: 2020-05-12 | End: 2020-10-13

## 2020-05-12 NOTE — PROGRESS NOTES
Nuris Vasques is a 44 y.o. female who was seen by synchronous (real-time) audio-video technology on 5/12/2020. Consent: Nuris Vasques, who was seen by synchronous (real-time) audio-video technology, and/or her healthcare decision maker, is aware that this patient-initiated, Telehealth encounter on 5/12/2020 is a billable service, with coverage as determined by her insurance carrier. She is aware that she may receive a bill and has provided verbal consent to proceed: Yes. This service was provided through (Telehealth, Virtual check in viaDoxy. me) patient  was at home provider Gilda Costello was at [place] [address]       She is here to establish care and get medication refill     Patient has multiple medical problems are listed below    Medication has been reviewed and updated    She is following up with Dr. Kiera Nguyen, for depression anxiety and panic attack, she has been stable on current medications    Assessment & Plan:   Diagnoses and all orders for this visit:    1. Acquired hypothyroidism  -     levothyroxine (Synthroid) 112 mcg tablet; Take 1 Tab by mouth Daily (before breakfast). -     CBC WITH AUTOMATED DIFF; Future  -     METABOLIC PANEL, COMPREHENSIVE; Future  -     TSH AND FREE T4; Future    2. Hyperlipidemia, unspecified hyperlipidemia type  -     atorvastatin (LIPITOR) 40 mg tablet; Take 1 Tab by mouth daily.  -     CBC WITH AUTOMATED DIFF; Future  -     LIPID PANEL; Future  -     METABOLIC PANEL, COMPREHENSIVE; Future    3. Gastroesophageal reflux disease, esophagitis presence not specified    Stable on current medications    4. Chronic fatigue  -     CBC WITH AUTOMATED DIFF; Future    5. Fibromyalgia  And she is on gabapentin for pain    6. Obesity (BMI 30-39. 9)    Lifestyle modification has been discussed with patient in details, decrease carbohydrates, decrease or eliminate sugar intake, gradually increase physical activity as tolerated to be about 4 to 5 hours a week.     7. Current moderate episode of major depressive disorder, unspecified whether recurrent (HCC)    Stable on current medications    8. PTSD (post-traumatic stress disorder)    9. Panic attack    10. ISA (generalized anxiety disorder)    Stable on current medications    11. Vitamin D deficiency  -     ergocalciferol (Vitamin D2) 1,250 mcg (50,000 unit) capsule; Take 1 Cap by mouth every seven (7) days for 90 days. -     VITAMIN D, 25 HYDROXY; Future    12. Centrilobular emphysema (HCC)  -     albuterol (PROVENTIL HFA, VENTOLIN HFA, PROAIR HFA) 90 mcg/actuation inhaler; Albuterol inhaler, 2 puffs up to every 4 hours if needed for wheezing, cough, shortness of breath  -     fluticasone propionate (FLOVENT HFA) 110 mcg/actuation inhaler; Take 2 Puffs by inhalation every twelve (12) hours. Subjective:   Ana Riojas is a 44 y.o. female who was seen for Medication Evaluation; Medication Refill; and Follow-up      Prior to Admission medications    Medication Sig Start Date End Date Taking? Authorizing Provider   atorvastatin (LIPITOR) 40 mg tablet Take 1 Tab by mouth daily. 5/12/20  Yes Yohana Malik MD   ergocalciferol (Vitamin D2) 1,250 mcg (50,000 unit) capsule Take 1 Cap by mouth every seven (7) days for 90 days. 5/12/20 8/10/20 Yes Yohana Malik MD   levothyroxine (Synthroid) 112 mcg tablet Take 1 Tab by mouth Daily (before breakfast). 5/12/20  Yes Yohana Malik MD   albuterol (PROVENTIL HFA, VENTOLIN HFA, PROAIR HFA) 90 mcg/actuation inhaler Albuterol inhaler, 2 puffs up to every 4 hours if needed for wheezing, cough, shortness of breath 5/12/20  Yes Yohana Malik MD   fluticasone propionate (FLOVENT HFA) 110 mcg/actuation inhaler Take 2 Puffs by inhalation every twelve (12) hours. 5/12/20  Yes Yohana Malik MD   omeprazole (PRILOSEC) 20 mg capsule Take 1 capsule by mouth once daily 3/3/20  Yes Yohana Malik MD   vortioxetine (TRINTELLIX) 5 mg tablet Take  by mouth daily.    Yes Provider, Historical   furosemide (LASIX) 20 mg tablet Take 1 Tab by mouth daily. 9/12/19  Yes Toby Berry NP   acetaminophen (TYLENOL EXTRA STRENGTH) 500 mg tablet Take  by mouth every six (6) hours as needed for Pain. Yes Provider, Historical   DULoxetine (CYMBALTA) 60 mg capsule Take 60 mg by mouth daily. Yes Provider, Historical   gabapentin (NEURONTIN) 300 mg capsule Take 300 mg by mouth three (3) times daily. Yes Provider, Historical   ARIPiprazole (ABILIFY) 10 mg tablet Take 10 mg by mouth daily. Yes Provider, Historical   ALPRAZolam (XANAX) 0.5 mg tablet Take 2 mg by mouth three (3) times daily as needed. Indications: patient taking 2 mg as needed   Yes Provider, Historical   atorvastatin (LIPITOR) 40 mg tablet Take 1 tablet by mouth once daily 3/3/20 5/12/20  Cecy Fuller MD   VITAMIN D2 50,000 unit capsule TAKE 1 CAPSULE BY MOUTH ONCE A WEEK 7/26/19 5/12/20  Toby Berry NP   estradiol-norethindrone (ACTIVELLA) 0.5-0.1 mg per tablet Take 1 Tab by mouth daily. 7/3/19 5/12/20  Theresa Leiva DO   estrogen, conjugated,-medroxyPROGESTERone (PREMPRO) 0.3-1.5 mg tab Take 1 Tab by mouth daily. 7/1/19 5/12/20  Theresa Leiva DO   amitriptyline (ELAVIL) 100 mg tablet Take  by mouth nightly. 5/12/20  Provider, Historical   albuterol (PROVENTIL HFA, VENTOLIN HFA, PROAIR HFA) 90 mcg/actuation inhaler Albuterol inhaler, 2 puffs up to every 4 hours if needed for wheezing, cough, shortness of breath 3/27/19 5/12/20  Brooke Mireles MD   fluticasone propionate (FLOVENT HFA) 110 mcg/actuation inhaler Take 2 Puffs by inhalation every twelve (12) hours. 3/27/19 5/12/20  Brooke Mireles MD   levothyroxine (SYNTHROID) 112 mcg tablet Take  by mouth Daily (before breakfast).   5/12/20  Provider, Historical     Allergies   Allergen Reactions    Bactrim [Sulfamethoprim] Unknown (comments)    Bactrim [Sulfamethoprim] Other (comments)     Thrush       Patient Active Problem List   Diagnosis Code  Current moderate episode of major depressive disorder ([facility] F32.1    Hyperlipidemia E78.5    Acquired hypothyroidism E03.9    Fibromyalgia M79.7    Obesity (BMI 30-39. 9) E66.9    Tobacco use Z72.0    Gastroesophageal reflux disease K21.9    Lymphocytic colitis K52.832    Vitamin D deficiency E55.9    ISA (generalized anxiety disorder) F41.1    PTSD (post-traumatic stress disorder) F43.10    Panic attack F41.0    Chronic fatigue R53.82     Patient Active Problem List    Diagnosis Date Noted    ISA (generalized anxiety disorder) 05/12/2020    PTSD (post-traumatic stress disorder) 05/12/2020    Panic attack 05/12/2020    Chronic fatigue 05/12/2020    Vitamin D deficiency 03/30/2019    Current moderate episode of major depressive disorder ([address] 12/06/2018    Hyperlipidemia 12/06/2018    Acquired hypothyroidism 12/06/2018    Fibromyalgia 12/06/2018    Obesity (BMI 30-39.9) 12/06/2018    Tobacco use 12/06/2018    Gastroesophageal reflux disease 12/06/2018    Lymphocytic colitis 12/06/2018     Current Outpatient Medications   Medication Sig Dispense Refill    atorvastatin (LIPITOR) 40 mg tablet Take 1 Tab by mouth daily. 90 Tab 0    ergocalciferol (Vitamin D2) 1,250 mcg (50,000 unit) capsule Take 1 Cap by mouth every seven (7) days for 90 days. 12 Cap 0    levothyroxine (Synthroid) 112 mcg tablet Take 1 Tab by mouth Daily (before breakfast). 90 Tab 0    albuterol (PROVENTIL HFA, VENTOLIN HFA, PROAIR HFA) 90 mcg/actuation inhaler Albuterol inhaler, 2 puffs up to every 4 hours if needed for wheezing, cough, shortness of breath 1 Inhaler 11    fluticasone propionate (FLOVENT HFA) 110 mcg/actuation inhaler Take 2 Puffs by inhalation every twelve (12) hours. 1 Inhaler 11    omeprazole (PRILOSEC) 20 mg capsule Take 1 capsule by mouth once daily 30 Cap 0    vortioxetine (TRINTELLIX) 5 mg tablet Take  by mouth daily.  furosemide (LASIX) 20 mg tablet Take 1 Tab by mouth daily.  90 Tab 3    acetaminophen (TYLENOL EXTRA STRENGTH) 500 mg tablet Take  by mouth every six (6) hours as needed for Pain.  DULoxetine (CYMBALTA) 60 mg capsule Take 60 mg by mouth daily.  gabapentin (NEURONTIN) 300 mg capsule Take 300 mg by mouth three (3) times daily.  ARIPiprazole (ABILIFY) 10 mg tablet Take 10 mg by mouth daily.  ALPRAZolam (XANAX) 0.5 mg tablet Take 2 mg by mouth three (3) times daily as needed. Indications: patient taking 2 mg as needed       Allergies   Allergen Reactions    Bactrim [Sulfamethoprim] Unknown (comments)    Bactrim [Sulfamethoprim] Other (comments)     Thrush     Past Medical History:   Diagnosis Date    Breast cyst     Depression     Emphysema lung ([address]     1 year and a half it was mentioned by ER Doc.  Fibromyalgia     Hyperlipidemia     Hypothyroidism     Ovarian cyst      Past Surgical History:   Procedure Laterality Date    BREAST SURGERY PROCEDURE UNLISTED      HX APPENDECTOMY      HX GYN      Cyst, Uterine Ablation    HX TUBAL LIGATION       Family History   Problem Relation Age of Onset    Heart Disease Mother     Heart Disease Father     Heart Disease Sister     MS Sister     Heart Disease Brother         3 heart attacks    Cancer Paternal Aunt         breast    Diabetes Neg Hx     Hypertension Neg Hx      Social History     Tobacco Use    Smoking status: Former Smoker     Packs/day: 0.50    Smokeless tobacco: Current User    Tobacco comment: patient stated she quit three weeks ago cig and now uses vape   Substance Use Topics    Alcohol use: Yes     Comment: occ       Review of Systems   Constitutional: Negative for chills, fever, malaise/fatigue and weight loss. HENT: Negative for congestion, ear discharge, ear pain, hearing loss and nosebleeds. Eyes: Negative for blurred vision, double vision and discharge. Respiratory: Positive for cough, sputum production, shortness of breath and wheezing. Negative for hemoptysis. Cardiovascular: Negative for chest pain, palpitations, claudication and leg swelling. Gastrointestinal: Negative for abdominal pain, constipation, diarrhea, nausea and vomiting. Genitourinary: Negative for dysuria, frequency and urgency. Musculoskeletal: Positive for back pain, joint pain and myalgias. Skin: Negative for itching and rash. Neurological: Negative for dizziness, tingling, sensory change, speech change, focal weakness, weakness and headaches. Psychiatric/Behavioral: Negative for depression and suicidal ideas. Objective:   Vital Signs: (As obtained by patient/caregiver at home)  There were no vitals taken for this visit.      [INSTRUCTIONS:  \"[x]\" Indicates a positive item  \"[]\" Indicates a negative item  -- DELETE ALL ITEMS NOT EXAMINED]    Constitutional: [x] Appears well-developed and well-nourished [x] No apparent distress      [] Abnormal -     Mental status: [x] Alert and awake  [x] Oriented to person/place/time [x] Able to follow commands    [] Abnormal -     Eyes:   EOM    [x]  Normal    [] Abnormal -   Sclera  [x]  Normal    [] Abnormal -          Discharge [x]  None visible   [] Abnormal -     HENT: [x] Normocephalic, atraumatic  [] Abnormal -   [x] Mouth/Throat: Mucous membranes are moist    External Ears [x] Normal  [] Abnormal -    Neck: [x] No visualized mass [] Abnormal -     Pulmonary/Chest: [x] Respiratory effort normal   [x] No visualized signs of difficulty breathing or respiratory distress        [] Abnormal -      Musculoskeletal:   [x] Normal gait with no signs of ataxia         [x] Normal range of motion of neck        [] Abnormal -     Neurological:        [x] No Facial Asymmetry (Cranial nerve 7 motor function) (limited exam due to video visit)          [x] No gaze palsy        [] Abnormal -          Skin:        [x] No significant exanthematous lesions or discoloration noted on facial skin         [] Abnormal -            Psychiatric:       [x] Normal Affect [] Abnormal -        [x] No Hallucinations    Other pertinent observable physical exam findings:-        We discussed the expected course, resolution and complications of the diagnosis(es) in detail. Medication risks, benefits, costs, interactions, and alternatives were discussed as indicated. I advised her to contact the office if her condition worsens, changes or fails to improve as anticipated. She expressed understanding with the diagnosis(es) and plan. Swati Tapia is a 44 y.o. female who was evaluated by a video visit encounter for concerns as above. Patient identification was verified prior to start of the visit. A caregiver was present when appropriate. Due to this being a TeleHealth encounter (During [address] emergency), evaluation of the following organ systems was limited: Vitals/Constitutional/EENT/Resp/CV/GI//MS/Neuro/Skin/Heme-Lymph-Imm. Pursuant to the emergency declaration under the [facility], [facility] waiver authority and the [employer] Act, this Virtual  Visit was conducted, with patient's (and/or legal guardian's) consent, to reduce the patient's risk of exposure to COVID-19 and provide necessary medical care. Services were provided through a video synchronous discussion virtually to substitute for in-person clinic visit. Patient and provider were located at their individual homes.       Brooke Wilson MD

## 2020-05-12 NOTE — TELEPHONE ENCOUNTER
Appt scheduled.      Future Appointments   Date Time Provider Erica Beatrice   8/7/2020  2:15 PM Adrian Curry MD [facility]

## 2020-05-14 ENCOUNTER — HOSPITAL ENCOUNTER (OUTPATIENT)
Dept: LAB | Age: 50
Discharge: HOME | End: 2020-05-14
Payer: MEDICAID

## 2020-05-14 DIAGNOSIS — R53.82 CHRONIC FATIGUE: ICD-10-CM

## 2020-05-14 DIAGNOSIS — E78.5 HYPERLIPIDEMIA, UNSPECIFIED HYPERLIPIDEMIA TYPE: ICD-10-CM

## 2020-05-14 DIAGNOSIS — E03.9 ACQUIRED HYPOTHYROIDISM: ICD-10-CM

## 2020-05-14 DIAGNOSIS — E55.9 VITAMIN D DEFICIENCY: ICD-10-CM

## 2020-05-14 LAB
25(OH)D3 SERPL-MCNC: 24.4 NG/ML (ref 30–100)
ALBUMIN SERPL-MCNC: 4.4 G/DL (ref 3.4–5)
ALBUMIN/GLOB SERPL: 1.3 {RATIO} (ref 0.8–1.7)
ALP SERPL-CCNC: 111 U/L (ref 45–117)
ALT SERPL-CCNC: 31 U/L (ref 13–56)
ANION GAP SERPL CALC-SCNC: 8 MMOL/L (ref 3–18)
AST SERPL-CCNC: 22 U/L (ref 10–38)
BASOPHILS # BLD: 0 K/UL (ref 0–0.1)
BASOPHILS NFR BLD: 0 % (ref 0–2)
BILIRUB SERPL-MCNC: 0.4 MG/DL (ref 0.2–1)
BUN SERPL-MCNC: 11 MG/DL (ref 7–18)
BUN/CREAT SERPL: 11 (ref 12–20)
CALCIUM SERPL-MCNC: 9.3 MG/DL (ref 8.5–10.1)
CHLORIDE SERPL-SCNC: 103 MMOL/L (ref 100–111)
CHOLEST SERPL-MCNC: 194 MG/DL
CO2 SERPL-SCNC: 29 MMOL/L (ref 21–32)
CREAT SERPL-MCNC: 1 MG/DL (ref 0.6–1.3)
DIFFERENTIAL METHOD BLD: ABNORMAL
EOSINOPHIL # BLD: 0.2 K/UL (ref 0–0.4)
EOSINOPHIL NFR BLD: 2 % (ref 0–5)
ERYTHROCYTE [DISTWIDTH] IN BLOOD BY AUTOMATED COUNT: 12.9 % (ref 11.6–14.5)
GLOBULIN SER CALC-MCNC: 3.4 G/DL (ref 2–4)
GLUCOSE SERPL-MCNC: 105 MG/DL (ref 74–99)
HCT VFR BLD AUTO: 46.5 % (ref 35–45)
HDLC SERPL-MCNC: 41 MG/DL (ref 40–60)
HDLC SERPL: 4.7 {RATIO} (ref 0–5)
HGB BLD-MCNC: 15.6 G/DL (ref 12–16)
LDLC SERPL CALC-MCNC: 85.8 MG/DL (ref 0–100)
LIPID PROFILE,FLP: ABNORMAL
LYMPHOCYTES # BLD: 2.4 K/UL (ref 0.9–3.6)
LYMPHOCYTES NFR BLD: 27 % (ref 21–52)
MCH RBC QN AUTO: 32.7 PG (ref 24–34)
MCHC RBC AUTO-ENTMCNC: 33.5 G/DL (ref 31–37)
MCV RBC AUTO: 97.5 FL (ref 74–97)
MONOCYTES # BLD: 0.4 K/UL (ref 0.05–1.2)
MONOCYTES NFR BLD: 4 % (ref 3–10)
NEUTS SEG # BLD: 5.7 K/UL (ref 1.8–8)
NEUTS SEG NFR BLD: 67 % (ref 40–73)
PLATELET # BLD AUTO: 271 K/UL (ref 135–420)
PMV BLD AUTO: 10.8 FL (ref 9.2–11.8)
POTASSIUM SERPL-SCNC: 4.3 MMOL/L (ref 3.5–5.5)
PROT SERPL-MCNC: 7.8 G/DL (ref 6.4–8.2)
RBC # BLD AUTO: 4.77 M/UL (ref 4.2–5.3)
SODIUM SERPL-SCNC: 140 MMOL/L (ref 136–145)
T4 FREE SERPL-MCNC: 1.2 NG/DL (ref 0.7–1.5)
TRIGL SERPL-MCNC: 336 MG/DL (ref ?–150)
TSH SERPL DL<=0.05 MIU/L-ACNC: 1.36 UIU/ML (ref 0.36–3.74)
VLDLC SERPL CALC-MCNC: 67.2 MG/DL
WBC # BLD AUTO: 8.7 K/UL (ref 4.6–13.2)

## 2020-05-14 PROCEDURE — 82306 VITAMIN D 25 HYDROXY: CPT

## 2020-05-14 PROCEDURE — 80053 COMPREHEN METABOLIC PANEL: CPT

## 2020-05-14 PROCEDURE — 85025 COMPLETE CBC W/AUTO DIFF WBC: CPT

## 2020-05-14 PROCEDURE — 80061 LIPID PANEL: CPT

## 2020-05-14 PROCEDURE — 84439 ASSAY OF FREE THYROXINE: CPT

## 2020-05-14 PROCEDURE — 36415 COLL VENOUS BLD VENIPUNCTURE: CPT

## 2020-05-26 ENCOUNTER — TELEPHONE (OUTPATIENT)
Dept: FAMILY MEDICINE CLINIC | Age: 50
End: 2020-05-26

## 2020-05-26 DIAGNOSIS — J43.2 CENTRILOBULAR EMPHYSEMA (HCC): Primary | ICD-10-CM

## 2020-05-26 NOTE — TELEPHONE ENCOUNTER
Called patient to find out what the pulmonary referral was needed for she states to follow up on her emphysema

## 2020-06-17 ENCOUNTER — VIRTUAL VISIT (OUTPATIENT)
Dept: FAMILY MEDICINE CLINIC | Age: 50
End: 2020-06-17

## 2020-06-17 DIAGNOSIS — R10.9 ABDOMINAL PAIN, UNSPECIFIED ABDOMINAL LOCATION: ICD-10-CM

## 2020-06-17 DIAGNOSIS — R19.7 DIARRHEA, UNSPECIFIED TYPE: Primary | ICD-10-CM

## 2020-06-17 DIAGNOSIS — R14.0 BLOATED ABDOMEN: ICD-10-CM

## 2020-06-17 NOTE — PROGRESS NOTES
Malinda Portillo is a 44 y.o. female who was seen by synchronous (real-time) audio-video technology on 6/17/2020. Consent: Malinda Portillo, who was seen by synchronous (real-time) audio-video technology, and/or her healthcare decision maker, is aware that this patient-initiated, Telehealth encounter on 6/17/2020 is a billable service, with coverage as determined by her insurance carrier. She is aware that she may receive a bill and has provided verbal consent to proceed: Yes. This service was provided through (Telehealth, Virtual check in viaDoxy. me) patient  was at home provider Amber Walden was at [address] [place] [address]         Patient is complaining about diarrhea for the last 4weeks proceed with abdominal pain, stool is yellowish no mucus no blood. Not related to specific food, she also has abdominal bloating. Patient had colonoscopy about 10 years ago and she had a history of lymphocytic colitis as per patient    She is not currently on any medication and not on follow-up with gastroenterologist.    Patient has no weight loss no fever no chills no decreased appetite      Assessment & Plan:     1. Unspecified diarrhea    2. Abdominal bloating  3. Abdominal pain unspecified location    Patient be referred for gastroenterology for further evaluation and possible colonoscopy and stool studies      Subjective:   Malinda Portillo is a 44 y.o. female who was seen for Diarrhea (4 weeks ) and Follow-up      Prior to Admission medications    Medication Sig Start Date End Date Taking? Authorizing Provider   omeprazole (PRILOSEC) 20 mg capsule Take 1 capsule by mouth once daily 5/12/20  Yes Catracho Suh MD   atorvastatin (LIPITOR) 40 mg tablet Take 1 Tab by mouth daily. 5/12/20  Yes Catracho Suh MD   ergocalciferol (Vitamin D2) 1,250 mcg (50,000 unit) capsule Take 1 Cap by mouth every seven (7) days for 90 days.  5/12/20 8/10/20 Yes Catracho Suh MD   levothyroxine (Synthroid) 112 mcg tablet Take 1 Tab by mouth Daily (before breakfast). 5/12/20  Yes Catracho Suh MD   albuterol (PROVENTIL HFA, VENTOLIN HFA, PROAIR HFA) 90 mcg/actuation inhaler Albuterol inhaler, 2 puffs up to every 4 hours if needed for wheezing, cough, shortness of breath 5/12/20  Yes Catracho Suh MD   fluticasone propionate (FLOVENT HFA) 110 mcg/actuation inhaler Take 2 Puffs by inhalation every twelve (12) hours. 5/12/20  Yes Catracho Suh MD   vortioxetine (TRINTELLIX) 5 mg tablet Take  by mouth daily. Yes Provider, Historical   acetaminophen (TYLENOL EXTRA STRENGTH) 500 mg tablet Take  by mouth every six (6) hours as needed for Pain. Yes Provider, Historical   DULoxetine (CYMBALTA) 60 mg capsule Take 60 mg by mouth daily. Yes Provider, Historical   gabapentin (NEURONTIN) 300 mg capsule Take 300 mg by mouth three (3) times daily. Yes Provider, Historical   ARIPiprazole (ABILIFY) 10 mg tablet Take 10 mg by mouth daily. Yes Provider, Historical   ALPRAZolam (XANAX) 0.5 mg tablet Take 2 mg by mouth three (3) times daily as needed. Indications: patient taking 2 mg as needed   Yes Provider, Historical     Allergies   Allergen Reactions    Bactrim [Sulfamethoprim] Unknown (comments)    Bactrim [Sulfamethoprim] Other (comments)     Thrush       Patient Active Problem List   Diagnosis Code    Current moderate episode of major depressive disorder [facility] F32.1    Hyperlipidemia E78.5    Acquired hypothyroidism E03.9    Fibromyalgia M79.7    Obesity (BMI 30-39. 9) E66.9    Tobacco use Z72.0    Gastroesophageal reflux disease K21.9    Lymphocytic colitis K52.832    Vitamin D deficiency E55.9    ISA (generalized anxiety disorder) F41.1    PTSD (post-traumatic stress disorder) F43.10    Panic attack F41.0    Chronic fatigue R53.82     Patient Active Problem List    Diagnosis Date Noted    ISA (generalized anxiety disorder) 05/12/2020    PTSD (post-traumatic stress disorder) 05/12/2020    Panic attack 05/12/2020    Chronic fatigue 05/12/2020    Vitamin D deficiency 03/30/2019    Current moderate episode of major depressive disorder ([facility] 12/06/2018    Hyperlipidemia 12/06/2018    Acquired hypothyroidism 12/06/2018    Fibromyalgia 12/06/2018    Obesity (BMI 30-39.9) 12/06/2018    Tobacco use 12/06/2018    Gastroesophageal reflux disease 12/06/2018    Lymphocytic colitis 12/06/2018     Current Outpatient Medications   Medication Sig Dispense Refill    omeprazole (PRILOSEC) 20 mg capsule Take 1 capsule by mouth once daily 90 Cap 1    atorvastatin (LIPITOR) 40 mg tablet Take 1 Tab by mouth daily. 90 Tab 0    ergocalciferol (Vitamin D2) 1,250 mcg (50,000 unit) capsule Take 1 Cap by mouth every seven (7) days for 90 days. 12 Cap 0    levothyroxine (Synthroid) 112 mcg tablet Take 1 Tab by mouth Daily (before breakfast). 90 Tab 0    albuterol (PROVENTIL HFA, VENTOLIN HFA, PROAIR HFA) 90 mcg/actuation inhaler Albuterol inhaler, 2 puffs up to every 4 hours if needed for wheezing, cough, shortness of breath 1 Inhaler 11    fluticasone propionate (FLOVENT HFA) 110 mcg/actuation inhaler Take 2 Puffs by inhalation every twelve (12) hours. 1 Inhaler 11    vortioxetine (TRINTELLIX) 5 mg tablet Take  by mouth daily.  acetaminophen (TYLENOL EXTRA STRENGTH) 500 mg tablet Take  by mouth every six (6) hours as needed for Pain.  DULoxetine (CYMBALTA) 60 mg capsule Take 60 mg by mouth daily.  gabapentin (NEURONTIN) 300 mg capsule Take 300 mg by mouth three (3) times daily.  ARIPiprazole (ABILIFY) 10 mg tablet Take 10 mg by mouth daily.  ALPRAZolam (XANAX) 0.5 mg tablet Take 2 mg by mouth three (3) times daily as needed.  Indications: patient taking 2 mg as needed       Allergies   Allergen Reactions    Bactrim [Sulfamethoprim] Unknown (comments)    Bactrim [Sulfamethoprim] Other (comments)     Thrush     Past Medical History:   Diagnosis Date    Breast cyst     Depression     Emphysema lung [facility])     1 year and a half it was mentioned by ER Doc.  Fibromyalgia     Hyperlipidemia     Hypothyroidism     Ovarian cyst      Past Surgical History:   Procedure Laterality Date    BREAST SURGERY PROCEDURE UNLISTED      HX APPENDECTOMY      HX GYN      Cyst, Uterine Ablation    HX TUBAL LIGATION       Family History   Problem Relation Age of Onset    Heart Disease Mother     Heart Disease Father     Heart Disease Sister     MS Sister     Heart Disease Brother         3 heart attacks    Cancer Paternal Aunt         breast    Diabetes Neg Hx     Hypertension Neg Hx      Social History     Tobacco Use    Smoking status: Former Smoker     Packs/day: 0.50    Smokeless tobacco: Current User    Tobacco comment: patient stated she quit three weeks ago cig and now uses vape   Substance Use Topics    Alcohol use: Yes     Comment: occ       Review of Systems   Constitutional: Negative for chills, fever, malaise/fatigue and weight loss. HENT: Negative for congestion, ear discharge, ear pain, hearing loss, nosebleeds, sinus pain and sore throat. Eyes: Negative for blurred vision, double vision and discharge. Respiratory: Negative for cough. Cardiovascular: Negative for chest pain, palpitations, claudication and leg swelling. Gastrointestinal: Positive for abdominal pain, diarrhea, heartburn and nausea. Negative for blood in stool, constipation, melena and vomiting. Genitourinary: Negative for dysuria, frequency and urgency. Musculoskeletal: Negative for myalgias. Skin: Negative for itching and rash. Neurological: Negative for dizziness, tingling, sensory change, speech change, focal weakness, weakness and headaches. Objective:   Vital Signs: (As obtained by patient/caregiver at home)  There were no vitals taken for this visit.      [INSTRUCTIONS:  \"[x]\" Indicates a positive item  \"[]\" Indicates a negative item  -- DELETE ALL ITEMS NOT EXAMINED]    Constitutional: [x] Appears well-developed and well-nourished [x] No apparent distress      [] Abnormal -     Mental status: [x] Alert and awake  [x] Oriented to person/place/time [x] Able to follow commands    [] Abnormal -     Eyes:   EOM    [x]  Normal    [] Abnormal -   Sclera  [x]  Normal    [] Abnormal -          Discharge [x]  None visible   [] Abnormal -     HENT: [x] Normocephalic, atraumatic  [] Abnormal -   [x] Mouth/Throat: Mucous membranes are moist    External Ears [x] Normal  [] Abnormal -    Neck: [x] No visualized mass [] Abnormal -     Pulmonary/Chest: [x] Respiratory effort normal   [x] No visualized signs of difficulty breathing or respiratory distress        [] Abnormal -      Musculoskeletal:   [x] Normal gait with no signs of ataxia         [x] Normal range of motion of neck        [] Abnormal -     Neurological:        [x] No Facial Asymmetry (Cranial nerve 7 motor function) (limited exam due to video visit)          [x] No gaze palsy        [] Abnormal -          Skin:        [x] No significant exanthematous lesions or discoloration noted on facial skin         [] Abnormal -            Psychiatric:       [x] Normal Affect [] Abnormal -        [x] No Hallucinations    Other pertinent observable physical exam findings:-        We discussed the expected course, resolution and complications of the diagnosis(es) in detail. Medication risks, benefits, costs, interactions, and alternatives were discussed as indicated. I advised her to contact the office if her condition worsens, changes or fails to improve as anticipated. She expressed understanding with the diagnosis(es) and plan. Rosalba Gómez is a 44 y.o. female who was evaluated by a video visit encounter for concerns as above. Patient identification was verified prior to start of the visit. A caregiver was present when appropriate.  Due to this being a [facility] encounter [facility]), evaluation of the following organ systems was limited: Vitals/Constitutional/EENT/Resp/CV/GI//MS/Neuro/Skin/Heme-Lymph-Imm. Pursuant to the emergency declaration under the [facility], [facility] waiver authority and the [employer] Act, this Virtual  Visit was conducted, with patient's (and/or legal guardian's) consent, to reduce the patient's risk of exposure to COVID-19 and provide necessary medical care. Services were provided through a video synchronous discussion virtually to substitute for in-person clinic visit. Patient and provider were located at their individual homes.       Nina Levine MD

## 2020-06-26 ENCOUNTER — TELEPHONE (OUTPATIENT)
Dept: FAMILY MEDICINE CLINIC | Age: 50
End: 2020-06-26

## 2020-06-26 DIAGNOSIS — R10.9 ABDOMINAL PAIN, UNSPECIFIED ABDOMINAL LOCATION: ICD-10-CM

## 2020-06-26 DIAGNOSIS — R19.7 DIARRHEA, UNSPECIFIED TYPE: Primary | ICD-10-CM

## 2020-06-26 DIAGNOSIS — R14.0 BLOATED ABDOMEN: ICD-10-CM

## 2020-06-26 NOTE — TELEPHONE ENCOUNTER
Patient called requesting for her referral for a colonoscopy be sent to this doctor:    Dr. Vikash Aguilar  [facility] [address]   [address] [place] [address],[address]  Danny Hernandez [address]  Ph: [phone]

## 2020-07-06 ENCOUNTER — TELEPHONE (OUTPATIENT)
Dept: FAMILY MEDICINE CLINIC | Age: 50
End: 2020-07-06

## 2020-07-06 DIAGNOSIS — R92.8 ABNORMAL MAMMOGRAM: Primary | ICD-10-CM

## 2020-07-06 NOTE — TELEPHONE ENCOUNTER
Patient would like a dx mammogram but her HM says she not due until next year.  Please advise thank you

## 2020-07-06 NOTE — TELEPHONE ENCOUNTER
Pt called asking for an order to have her yearly mammogram done. Last mammogram was ordered by Dr. Liz Form RICKY MAMMO BI DX INCL CAD    Please advise.

## 2020-08-05 ENCOUNTER — HOSPITAL ENCOUNTER (OUTPATIENT)
Dept: ULTRASOUND IMAGING | Age: 50
Discharge: HOME | End: 2020-08-05
Attending: LEGAL MEDICINE
Payer: MEDICAID

## 2020-08-05 ENCOUNTER — HOSPITAL ENCOUNTER (OUTPATIENT)
Dept: MAMMOGRAPHY | Age: 50
Discharge: HOME | End: 2020-08-05
Attending: LEGAL MEDICINE
Payer: MEDICAID

## 2020-08-05 DIAGNOSIS — N63.10 BREAST MASS, RIGHT: ICD-10-CM

## 2020-08-05 DIAGNOSIS — R92.8 ABNORMAL MAMMOGRAM: ICD-10-CM

## 2020-08-05 PROCEDURE — 77062 BREAST TOMOSYNTHESIS BI: CPT

## 2020-08-05 PROCEDURE — 76642 ULTRASOUND BREAST LIMITED: CPT

## 2020-08-05 NOTE — PROGRESS NOTES
No mammographic evidence of  malignancy or other focal abnormality to explain focal right breast pain

## 2020-08-07 ENCOUNTER — OFFICE VISIT (OUTPATIENT)
Dept: FAMILY MEDICINE CLINIC | Age: 50
End: 2020-08-07

## 2020-08-07 ENCOUNTER — HOSPITAL ENCOUNTER (OUTPATIENT)
Dept: LAB | Age: 50
Discharge: HOME | End: 2020-08-07
Payer: MEDICAID

## 2020-08-07 VITALS
WEIGHT: 227 LBS | DIASTOLIC BLOOD PRESSURE: 81 MMHG | BODY MASS INDEX: 37.82 KG/M2 | RESPIRATION RATE: 16 BRPM | TEMPERATURE: 97.7 F | SYSTOLIC BLOOD PRESSURE: 122 MMHG | HEART RATE: 77 BPM | OXYGEN SATURATION: 95 % | HEIGHT: 65 IN

## 2020-08-07 DIAGNOSIS — R14.0 ABDOMINAL DISTENSION: ICD-10-CM

## 2020-08-07 DIAGNOSIS — R60.1 GENERALIZED EDEMA: ICD-10-CM

## 2020-08-07 DIAGNOSIS — E66.01 SEVERE OBESITY (HCC): ICD-10-CM

## 2020-08-07 DIAGNOSIS — R14.0 BLOATED ABDOMEN: ICD-10-CM

## 2020-08-07 DIAGNOSIS — E03.9 ACQUIRED HYPOTHYROIDISM: ICD-10-CM

## 2020-08-07 DIAGNOSIS — K21.9 GASTROESOPHAGEAL REFLUX DISEASE, ESOPHAGITIS PRESENCE NOT SPECIFIED: ICD-10-CM

## 2020-08-07 DIAGNOSIS — R19.7 DIARRHEA, UNSPECIFIED TYPE: ICD-10-CM

## 2020-08-07 DIAGNOSIS — R60.1 GENERALIZED EDEMA: Primary | ICD-10-CM

## 2020-08-07 LAB
ALBUMIN SERPL-MCNC: 3.7 G/DL (ref 3.4–5)
ALBUMIN/GLOB SERPL: 1.3 {RATIO} (ref 0.8–1.7)
ALP SERPL-CCNC: 121 U/L (ref 45–117)
ALT SERPL-CCNC: 88 U/L (ref 13–56)
AST SERPL-CCNC: 28 U/L (ref 10–38)
BILIRUB DIRECT SERPL-MCNC: 0.1 MG/DL (ref 0–0.2)
BILIRUB SERPL-MCNC: 1.1 MG/DL (ref 0.2–1)
GLOBULIN SER CALC-MCNC: 2.9 G/DL (ref 2–4)
PROT SERPL-MCNC: 6.6 G/DL (ref 6.4–8.2)

## 2020-08-07 PROCEDURE — 80076 HEPATIC FUNCTION PANEL: CPT

## 2020-08-07 PROCEDURE — 36415 COLL VENOUS BLD VENIPUNCTURE: CPT

## 2020-08-07 RX ORDER — LEVOTHYROXINE SODIUM 125 UG/1
125 TABLET ORAL DAILY
COMMUNITY
Start: 2020-08-04 | End: 2021-06-22 | Stop reason: SDUPTHER

## 2020-08-07 RX ORDER — FUROSEMIDE 40 MG/1
40 TABLET ORAL DAILY
Qty: 30 TAB | Refills: 0 | Status: SHIPPED | OUTPATIENT
Start: 2020-08-07 | End: 2020-08-27

## 2020-08-07 RX ORDER — FUROSEMIDE 20 MG/1
20 TABLET ORAL
COMMUNITY
Start: 2020-08-04 | End: 2020-08-07 | Stop reason: DRUGHIGH

## 2020-08-07 NOTE — PROGRESS NOTES
Gena Hagen is a 44 y.o. female (: 1970) presenting to address:    Chief Complaint   Patient presents with   [facility] Follow Up     swelling of the feet and hand       Vitals:    20 1421   Resp: 16   Temp: 97.7 °F (36.5 °C)   TempSrc: Oral   Weight: 227 lb (103 kg)   Height: 5' 5\" (1.651 m)       Hearing/Vision:   No exam data present    Learning Assessment:     Learning Assessment 2018   PRIMARY LEARNER Patient   HIGHEST LEVEL OF EDUCATION - PRIMARY LEARNER  DID NOT GRADUATE HIGH SCHOOL   BARRIERS PRIMARY LEARNER NONE   CO-LEARNER CAREGIVER No   PRIMARY LANGUAGE ENGLISH   LEARNER PREFERENCE PRIMARY LISTENING   ANSWERED BY patient   RELATIONSHIP SELF     Depression Screening:     3 most recent PHQ Screens 2018   Little interest or pleasure in doing things Several days   Feeling down, depressed, irritable, or hopeless Several days   Total Score PHQ 2 2     Fall Risk Assessment:     Fall Risk Assessment, last 12 mths 2020   Able to walk? Yes   Fall in past 12 months? -     Abuse Screening:     Abuse Screening Questionnaire 2020   Do you ever feel afraid of your partner? N   Are you in a relationship with someone who physically or mentally threatens you? N   Is it safe for you to go home? Y     Coordination of Care Questionaire:   1. Have you been to the ER, urgent care clinic since your last visit? Hospitalized since your last visit? YES    2. Have you seen or consulted any other health care providers outside of the [address] [place] [address] since your last visit? Include any pap smears or colon screening. NO    Advanced Directive:   1. Do you have an Advanced Directive? NO    2. Would you like information on Advanced Directives?  NO

## 2020-08-07 NOTE — PROGRESS NOTES
Marty Jewell     Chief Complaint   Patient presents with   [facility] Follow Up     swelling of the feet and hand     Vitals:    08/07/20 1421   BP: 122/81   Pulse: 77   Resp: 16   Temp: 97.7 °F (36.5 °C)   TempSrc: Oral   SpO2: 95%   Weight: 227 lb (103 kg)   Height: 5' 5\" (1.651 m)         HPI: Patient is here for follow-up after hospital admission, she was admitted on August 1 and discharged in August 4  She had generalized edema  Patient was given diuretics  Had blood work done    Ordered blood work and imaging studies has been reviewed    CT scan and ultrasound did not show any ascites    Patient was given Lasix for 3 days at home 20 mg, currently she started swelling back lower extremity and hands and abdominal swelling, she has no fever no chills no nausea no vomiting no change in appetite            Past Medical History:   Diagnosis Date    Breast cyst     Depression     Emphysema lung ([address]     1 year and a half it was mentioned by ER Doc.     Fibromyalgia     Hyperlipidemia     Hypothyroidism     Ovarian cyst      Past Surgical History:   Procedure Laterality Date    BREAST SURGERY PROCEDURE UNLISTED      HX APPENDECTOMY      HX GYN      Cyst, Uterine Ablation    HX TUBAL LIGATION      IMPLANT BREAST SILICONE/EQ Bilateral     Approximately 1999     Social History     Tobacco Use    Smoking status: Former Smoker     Packs/day: 0.50    Smokeless tobacco: Current User    Tobacco comment: patient stated she quit three weeks ago cig and now uses vape   Substance Use Topics    Alcohol use: Yes     Comment: occ       Family History   Problem Relation Age of Onset    Heart Disease Mother     Heart Disease Father     Heart Disease Sister     MS Sister     Heart Disease Brother         3 heart attacks    Cancer Paternal Aunt         breast    Breast Cancer Maternal Aunt     Diabetes Neg Hx     Hypertension Neg Hx        Review of Systems   Constitutional: Negative for chills, fever, malaise/fatigue and weight loss. HENT: Negative for congestion, ear discharge, ear pain, hearing loss, nosebleeds, sinus pain and sore throat. Eyes: Negative for blurred vision, double vision and discharge. Respiratory: Positive for cough, sputum production, shortness of breath and wheezing. Negative for hemoptysis. Cardiovascular: Negative for chest pain, palpitations, claudication and leg swelling. Gastrointestinal: Positive for abdominal pain and diarrhea. Negative for blood in stool, constipation, melena, nausea and vomiting. Genitourinary: Negative for dysuria, flank pain, frequency, hematuria and urgency. Musculoskeletal: Positive for joint pain and myalgias. Negative for back pain, falls and neck pain. Skin: Negative for itching and rash. Neurological: Negative for dizziness, tingling, sensory change, speech change, focal weakness, weakness and headaches. Psychiatric/Behavioral: Positive for depression. Negative for hallucinations, substance abuse and suicidal ideas. The patient has insomnia. The patient is not nervous/anxious. Physical Exam  Vitals signs and nursing note reviewed. Constitutional:       General: She is not in acute distress. Appearance: She is well-developed. She is not diaphoretic. HENT:      Head: Normocephalic and atraumatic. Mouth/Throat:      Pharynx: No oropharyngeal exudate. Eyes:      General: No scleral icterus. Right eye: No discharge. Left eye: No discharge. Conjunctiva/sclera: Conjunctivae normal.      Pupils: Pupils are equal, round, and reactive to light. Neck:      Thyroid: No thyromegaly. Cardiovascular:      Rate and Rhythm: Normal rate and regular rhythm. Heart sounds: Normal heart sounds. Pulmonary:      Effort: Pulmonary effort is normal. No respiratory distress. Breath sounds: Normal breath sounds. No wheezing or rales. Chest:      Chest wall: No tenderness.    Abdominal:      General: Bowel sounds are normal. There is no distension. Palpations: Abdomen is soft. Tenderness: There is no abdominal tenderness. There is no guarding or rebound. Comments: Shifting dullness is positive    Musculoskeletal: Normal range of motion. General: Tenderness present. No deformity. Lymphadenopathy:      Cervical: No cervical adenopathy. Skin:     General: Skin is warm and dry. Coloration: Skin is not pale. Findings: No erythema or rash. Neurological:      General: No focal deficit present. Mental Status: She is alert and oriented to person, place, and time. Mental status is at baseline. Coordination: Coordination normal.   Psychiatric:         Behavior: Behavior normal.         Thought Content: Thought content normal.         Judgment: Judgment normal.          Assessment and plan     Plan of care has been discussed with the patient, he agrees to the plan and verbalized understanding. All his questions were answered  More than 50% of the time spent in this visit was counseling the patient about  illness and treatment options         1. Severe obesity ([address]      2. Gastroesophageal reflux disease, esophagitis presence not specified      3. Diarrhea, unspecified type  Patient has been having yellow diarrhea for a month and she is scheduled to see gastroenterology    Florencia Arambula MD  For coloscopy     4. Bloated abdomen      5. Acquired hypothyroidis    Medication has bee adjusted right in the hospital  now she is taking 125 mg daily   6. Generalized edema     Edema is getting worse to resume furosemide 40 mg daily    - furosemide (LASIX) 40 mg tablet; Take 1 Tab by mouth daily. Dispense: 30 Tab; Refill: 0  - HEPATIC FUNCTION PANEL; Future  - US ABD COMP; Future    7.  Abdominal distension    Repeat US for possible ascites   - US ABD COMP; Future    Current Outpatient Medications   Medication Sig Dispense Refill    furosemide (LASIX) 40 mg tablet Take 1 Tab by mouth daily. 30 Tab 0    omeprazole (PRILOSEC) 20 mg capsule Take 1 capsule by mouth once daily 90 Cap 1    ergocalciferol (Vitamin D2) 1,250 mcg (50,000 unit) capsule Take 1 Cap by mouth every seven (7) days for 90 days. 12 Cap 0    levothyroxine (Synthroid) 112 mcg tablet Take 1 Tab by mouth Daily (before breakfast). (Patient taking differently: Take 125 mcg by mouth Daily (before breakfast). ) 90 Tab 0    albuterol (PROVENTIL HFA, VENTOLIN HFA, PROAIR HFA) 90 mcg/actuation inhaler Albuterol inhaler, 2 puffs up to every 4 hours if needed for wheezing, cough, shortness of breath 1 Inhaler 11    fluticasone propionate (FLOVENT HFA) 110 mcg/actuation inhaler Take 2 Puffs by inhalation every twelve (12) hours. 1 Inhaler 11    vortioxetine (TRINTELLIX) 5 mg tablet Take  by mouth daily.  acetaminophen (TYLENOL EXTRA STRENGTH) 500 mg tablet Take  by mouth every six (6) hours as needed for Pain.  DULoxetine (CYMBALTA) 60 mg capsule Take 60 mg by mouth daily.  gabapentin (NEURONTIN) 300 mg capsule Take 300 mg by mouth three (3) times daily.  ARIPiprazole (ABILIFY) 10 mg tablet Take 10 mg by mouth daily.  ALPRAZolam (XANAX) 0.5 mg tablet Take 2 mg by mouth three (3) times daily as needed. Indications: patient taking 2 mg as needed      atorvastatin (LIPITOR) 40 mg tablet Take 1 Tab by mouth daily.  90 Tab 0       Patient Active Problem List    Diagnosis Date Noted    Severe obesity ([facility] [address] 08/07/2020    ISA (generalized anxiety disorder) 05/12/2020    PTSD (post-traumatic stress disorder) 05/12/2020    Panic attack 05/12/2020    Chronic fatigue 05/12/2020    Vitamin D deficiency 03/30/2019    Current moderate episode of major depressive disorder ([address] 12/06/2018    Hyperlipidemia 12/06/2018    Acquired hypothyroidism 12/06/2018    Fibromyalgia 12/06/2018    Obesity (BMI 30-39.9) 12/06/2018    Tobacco use 12/06/2018    Gastroesophageal reflux disease 12/06/2018    Lymphocytic colitis 12/06/2018     Results for orders placed or performed during the hospital encounter of 05/14/20   CBC WITH AUTOMATED DIFF   Result Value Ref Range    WBC 8.7 4.6 - 13.2 K/uL    RBC 4.77 4.20 - 5.30 M/uL    HGB 15.6 12.0 - 16.0 g/dL    HCT 46.5 (H) 35.0 - 45.0 %    MCV 97.5 (H) 74.0 - 97.0 FL    MCH 32.7 24.0 - 34.0 PG    MCHC 33.5 31.0 - 37.0 g/dL    RDW 12.9 11.6 - 14.5 %    PLATELET 321 368 - 262 K/uL    MPV 10.8 9.2 - 11.8 FL    NEUTROPHILS 67 40 - 73 %    LYMPHOCYTES 27 21 - 52 %    MONOCYTES 4 3 - 10 %    EOSINOPHILS 2 0 - 5 %    BASOPHILS 0 0 - 2 %    ABS. NEUTROPHILS 5.7 1.8 - 8.0 K/UL    ABS. LYMPHOCYTES 2.4 0.9 - 3.6 K/UL    ABS. MONOCYTES 0.4 0.05 - 1.2 K/UL    ABS. EOSINOPHILS 0.2 0.0 - 0.4 K/UL    ABS. BASOPHILS 0.0 0.0 - 0.1 K/UL    DF AUTOMATED     LIPID PANEL   Result Value Ref Range    LIPID PROFILE          Cholesterol, total 194 <200 MG/DL    Triglyceride 336 (H) <150 MG/DL    HDL Cholesterol 41 40 - 60 MG/DL    LDL, calculated 85.8 0 - 100 MG/DL    VLDL, calculated 67.2 MG/DL    CHOL/HDL Ratio 4.7 0 - 5.0     METABOLIC PANEL, COMPREHENSIVE   Result Value Ref Range    Sodium 140 136 - 145 mmol/L    Potassium 4.3 3.5 - 5.5 mmol/L    Chloride 103 100 - 111 mmol/L    CO2 29 21 - 32 mmol/L    Anion gap 8 3.0 - 18 mmol/L    Glucose 105 (H) 74 - 99 mg/dL    BUN 11 7.0 - 18 MG/DL    Creatinine 1.00 0.6 - 1.3 MG/DL    BUN/Creatinine ratio 11 (L) 12 - 20      GFR est AA >60 >60 ml/min/1.73m2    GFR est non-AA 58 (L) >60 ml/min/1.73m2    Calcium 9.3 8.5 - 10.1 MG/DL    Bilirubin, total 0.4 0.2 - 1.0 MG/DL    ALT (SGPT) 31 13 - 56 U/L    AST (SGOT) 22 10 - 38 U/L    Alk.  phosphatase 111 45 - 117 U/L    Protein, total 7.8 6.4 - 8.2 g/dL    Albumin 4.4 3.4 - 5.0 g/dL    Globulin 3.4 2.0 - 4.0 g/dL    A-G Ratio 1.3 0.8 - 1.7     TSH AND FREE T4   Result Value Ref Range    TSH 1.36 0.36 - 3.74 uIU/mL    T4, Free 1.2 0.7 - 1.5 NG/DL   VITAMIN D, 25 HYDROXY   Result Value Ref Range    Vitamin D 25-Hydroxy 24.4 (L) 30 - 100 ng/mL     Hospital Outpatient Visit on 05/14/2020   Component Date Value Ref Range Status    WBC 05/14/2020 8.7  4.6 - 13.2 K/uL Final    RBC 05/14/2020 4.77  4.20 - 5.30 M/uL Final    HGB 05/14/2020 15.6  12.0 - 16.0 g/dL Final    HCT 05/14/2020 46.5* 35.0 - 45.0 % Final    MCV 05/14/2020 97.5* 74.0 - 97.0 FL Final    MCH 05/14/2020 32.7  24.0 - 34.0 PG Final    MCHC 05/14/2020 33.5  31.0 - 37.0 g/dL Final    RDW 05/14/2020 12.9  11.6 - 14.5 % Final    PLATELET 03/64/3388 306  135 - 420 K/uL Final    MPV 05/14/2020 10.8  9.2 - 11.8 FL Final    NEUTROPHILS 05/14/2020 67  40 - 73 % Final    LYMPHOCYTES 05/14/2020 27  21 - 52 % Final    MONOCYTES 05/14/2020 4  3 - 10 % Final    EOSINOPHILS 05/14/2020 2  0 - 5 % Final    BASOPHILS 05/14/2020 0  0 - 2 % Final    ABS. NEUTROPHILS 05/14/2020 5.7  1.8 - 8.0 K/UL Final    ABS. LYMPHOCYTES 05/14/2020 2.4  0.9 - 3.6 K/UL Final    ABS. MONOCYTES 05/14/2020 0.4  0.05 - 1.2 K/UL Final    ABS. EOSINOPHILS 05/14/2020 0.2  0.0 - 0.4 K/UL Final    ABS. BASOPHILS 05/14/2020 0.0  0.0 - 0.1 K/UL Final    DF 05/14/2020 AUTOMATED    Final    LIPID PROFILE 05/14/2020        Final    Cholesterol, total 05/14/2020 194  <200 MG/DL Final    Triglyceride 05/14/2020 336* <150 MG/DL Final    Comment: The drugs N-acetylcysteine (NAC) and  Metamiszole have been found to cause falsely  low results in this chemical assay. Please  be sure to submit blood samples obtained  BEFORE administration of either of these  drugs to assure correct results.       HDL Cholesterol 05/14/2020 41  40 - 60 MG/DL Final    LDL, calculated 05/14/2020 85.8  0 - 100 MG/DL Final    VLDL, calculated 05/14/2020 67.2  MG/DL Final    CHOL/HDL Ratio 05/14/2020 4.7  0 - 5.0   Final    Sodium 05/14/2020 140  136 - 145 mmol/L Final    Potassium 05/14/2020 4.3  3.5 - 5.5 mmol/L Final    Chloride 05/14/2020 103  100 - 111 mmol/L Final    CO2 05/14/2020 29  21 - 32 mmol/L Final    Anion gap 05/14/2020 8  3.0 - 18 mmol/L Final    Glucose 05/14/2020 105* 74 - 99 mg/dL Final    BUN 05/14/2020 11  7.0 - 18 MG/DL Final    Creatinine 05/14/2020 1.00  0.6 - 1.3 MG/DL Final    BUN/Creatinine ratio 05/14/2020 11* 12 - 20   Final    GFR est AA 05/14/2020 >60  >60 ml/min/1.73m2 Final    GFR est non-AA 05/14/2020 58* >60 ml/min/1.73m2 Final    Comment: (NOTE)  Estimated GFR is calculated using the Modification of Diet in Renal   Disease (MDRD) Study equation, reported for both  Americans   (GFRAA) and non- Americans (GFRNA), and normalized to 1.73m2   body surface area. The physician must decide which value applies to   the patient. The MDRD study equation should only be used in   individuals age 25 or older. It has not been validated for the   following: pregnant women, patients with serious comorbid conditions,   or on certain medications, or persons with extremes of body size,   muscle mass, or nutritional status.  Calcium 05/14/2020 9.3  8.5 - 10.1 MG/DL Final    Bilirubin, total 05/14/2020 0.4  0.2 - 1.0 MG/DL Final    ALT (SGPT) 05/14/2020 31  13 - 56 U/L Final    AST (SGOT) 05/14/2020 22  10 - 38 U/L Final    Alk. phosphatase 05/14/2020 111  45 - 117 U/L Final    Protein, total 05/14/2020 7.8  6.4 - 8.2 g/dL Final    Albumin 05/14/2020 4.4  3.4 - 5.0 g/dL Final    Globulin 05/14/2020 3.4  2.0 - 4.0 g/dL Final    A-G Ratio 05/14/2020 1.3  0.8 - 1.7   Final    TSH 05/14/2020 1.36  0.36 - 3.74 uIU/mL Final    T4, Free 05/14/2020 1.2  0.7 - 1.5 NG/DL Final    Vitamin D 25-Hydroxy 05/14/2020 24.4* 30 - 100 ng/mL Final    Comment: (NOTE)  Deficiency               <20 ng/mL  Insufficiency          20-30 ng/mL  Sufficient             ng/mL  Possible toxicity       >100 ng/mL    The Method used is Siemens Advia Centaur currently standardized to a   Center of Disease Control and Prevention (CDC) certified reference   [facility].  Samples containing fluorescein dye can produce falsely   elevated values when tested with the ADVIA Centaur Vitamin D Assay. It is recommended that results in the toxic range, >100 ng/mL, be   retested 72 hours post fluorescein exposure.

## 2020-08-08 ENCOUNTER — TELEPHONE (OUTPATIENT)
Dept: FAMILY MEDICINE CLINIC | Age: 50
End: 2020-08-08

## 2020-08-08 DIAGNOSIS — R60.1 GENERALIZED EDEMA: Primary | ICD-10-CM

## 2020-08-09 NOTE — TELEPHONE ENCOUNTER
After hours call    Recently hospitalized with generalized edema of unknown etiology, d/c'ed on 20mg lasix daily. Was recently seen by Dr. Raven John with some increasing swelling, and thus lasix dose was incresed from 20mg to 40mg daily. Notes she took lasix 40mg yesterday, and 40mg again today, and swelling still increasing (instead of either staying the same nor decreasing). Notes no increasing dyspnea. No substernal chest pain, but some very light discomfort \"like a dull ache\" under her right breast on her upper abdomen. Denies any easy bleeding/bruising. Reviewed labs briefly. PLAN:  > already took 40mg lasix today, so add additional 40mg lasix PO now  > take 40mg lasix tomorrow morning  > if still worsening tomorrow afternoon, may take an additional 40mg to a total of 80mg lasix  > call office in morning on Monday (likely will need additional labs)  > notes she has some OTC potassium, so will take \"1 of the 99mg potassium pills\" with each of the additional 40mg of lasix  > if symptoms continue to progress, return to ED    Karla Jo voiced understanding.     Lita Miranda MD  Internal Medicine, Family Medicine & Sports Medicine

## 2020-08-11 ENCOUNTER — TELEPHONE (OUTPATIENT)
Dept: FAMILY MEDICINE CLINIC | Age: 50
End: 2020-08-11

## 2020-08-11 NOTE — TELEPHONE ENCOUNTER
Patient called because she had ultrasound completed at [facility] today and is cancelling appointment that was scheduled with [facility].

## 2020-08-17 ENCOUNTER — TELEPHONE (OUTPATIENT)
Dept: FAMILY MEDICINE CLINIC | Age: 50
End: 2020-08-17

## 2020-08-18 ENCOUNTER — TELEPHONE (OUTPATIENT)
Dept: SURGERY | Age: 50
End: 2020-08-18

## 2020-08-18 NOTE — TELEPHONE ENCOUNTER
yina at [address] [facility] states they faxed a referral to gastro by mistake to dr leary it was suppose to go to dr Mark Cantu instructed to disregard this referral

## 2020-08-27 ENCOUNTER — VIRTUAL VISIT (OUTPATIENT)
Dept: FAMILY MEDICINE CLINIC | Age: 50
End: 2020-08-27

## 2020-08-27 ENCOUNTER — TELEPHONE (OUTPATIENT)
Dept: FAMILY MEDICINE CLINIC | Age: 50
End: 2020-08-27

## 2020-08-27 DIAGNOSIS — E03.9 ACQUIRED HYPOTHYROIDISM: ICD-10-CM

## 2020-08-27 DIAGNOSIS — R19.7 DIARRHEA, UNSPECIFIED TYPE: ICD-10-CM

## 2020-08-27 DIAGNOSIS — M79.7 FIBROMYALGIA: ICD-10-CM

## 2020-08-27 DIAGNOSIS — E78.5 HYPERLIPIDEMIA, UNSPECIFIED HYPERLIPIDEMIA TYPE: ICD-10-CM

## 2020-08-27 DIAGNOSIS — R60.1 GENERALIZED EDEMA: Primary | ICD-10-CM

## 2020-08-27 DIAGNOSIS — E55.9 VITAMIN D DEFICIENCY: ICD-10-CM

## 2020-08-27 RX ORDER — AMITRIPTYLINE HYDROCHLORIDE 100 MG/1
200 TABLET, FILM COATED ORAL
COMMUNITY

## 2020-08-27 RX ORDER — METOLAZONE 5 MG/1
5 TABLET ORAL DAILY
Qty: 30 TAB | Refills: 0 | Status: SHIPPED | OUTPATIENT
Start: 2020-08-27 | End: 2020-09-18

## 2020-08-27 RX ORDER — ERGOCALCIFEROL 1.25 MG/1
50000 CAPSULE ORAL
Qty: 12 CAP | Refills: 0 | Status: SHIPPED | OUTPATIENT
Start: 2020-08-27 | End: 2021-04-23

## 2020-08-27 NOTE — PROGRESS NOTES
Saadia Meza is a 44 y.o. female who was seen by synchronous (real-time) audio-video technology on 2020 for Hospital Follow Up    Patient is here for follow-up after the hospital admission       Hospital records has been reviewed, patient was given IV Lasix, her swelling has improved but now she is she has been home ankle has been swollen and now her legs are swollen as well despite taking Lasix. [facility]    Discharge Summary - [address]     Kimberlyn Cunningham is a 44 y.o. female. : 1970   MRN: [id number]    Attending Physician: Lupe Galvez MD  PCP: PCP MD Errol    Transition of Care   Admit Date: 2020 D/C Date: 2020 Patient Class: Observation/Short Stay     Primary Discharge Diagnosis:   Edema    Patient high risk for readmission: Yes  Advanced Care Planning (ACP) documented this admission: No  Code Status at Time of Discharge: Full Code    Follow Up Needed:   None    Pending Results:  None        Discharge Medications:  Current Discharge Medication List     CONTINUE these medications which have CHANGED   Details   furosemide (LASIX) 40 mg PO TABS Take 1 tab Twice a day for 3 days then 1 tab once a day for 3 more days then take once a day as needed for Swelling  Qty: 60 Tab, Refills: 0       CONTINUE these medications which have NOT CHANGED   Details   albuterol (PROVENTIL,VENTOLIN,PROAIR) 90 mcg/actuation INH HFAA inhaler Take 1-2 Puffs inhaled by mouth Every 4 Hours As Needed for Wheezing (wheeze). Qty: 1 Inhaler, Refills: 0     ALPRAZolam (XANAX) 1 mg PO TABS Take 1 mg by Mouth 2 Times Daily As Needed. amitriptyline (ELAVIL) 100 mg PO TABS Take 200 mg by Mouth Every Night at Bedtime. ARIPiprazole (ABILIFY) 10 mg PO TABS Take 10 mg by Mouth Once a Day. atorvastatin (LIPITOR) 40 mg PO TABS Take 40 mg by Mouth Every Night at Bedtime.      chlorproMAZINE (THORAZINE) 50 mg PO TABS Take 150 mg by Mouth Every Night at Bedtime. DULoxetine 60 mg PO CPDR Take 60 mg by Mouth Twice Daily.     ergocalciferol (VITAMIN D2) 50,000 unit PO CAPS Take 50,000 Units by Mouth Every 7 Days. gabapentin (NEURONTIN) 300 mg PO CAPS Take 300 mg by Mouth 3 Times Daily. levothyroxine (SYNTHROID) 125 mcg PO TABS Take 1 Tab by Mouth Once a Day. Qty: 30 Tab, Refills: 3     omeprazole (PRILOSEC) 20 mg PO CPDR Take 20 mg by Mouth Every Morning Before Breakfast.     propranoloL (INDERAL) 20 mg PO TABS Take 40 mg by Mouth Every Night at Bedtime. vortioxetine (TRINTELLIX) PO Take 20 mg by Mouth Once a Day. Indications: major depressive disorder     Zolpidem (AMBIEN CR) 12.5 mg PO TbMP Take 12.5 mg by Mouth Every Night at Bedtime. Patient does have follow-up with psychiatry    Dr. Luann Oglesby next week will call his office and discussed possibility of psych medication causing her swelling    May need to speak to her psychiatrist      5/12/20   Phone: [phone]; Fax: [phone]            Assessment & Plan:   Diagnoses and all orders for this visit:    1. Generalized edema    No underlying wheezing can be identified at this time     To stop furosemide  and start   -     metOLazone (ZAROXOLYN) 5 mg tablet; Take 1 Tab by mouth daily for 30 days. 2. Vitamin D deficiency  -     ergocalciferol (Vitamin D2) 1,250 mcg (50,000 unit) capsule; Take 1 Cap by mouth every seven (7) days for 90 days. 3. Acquired hypothyroidism    4. Diarrhea, unspecified type    5. Hyperlipidemia, unspecified hyperlipidemia type    6. Fibromyalgia          712  Subjective:       Prior to Admission medications    Medication Sig Start Date End Date Taking? Authorizing Provider   metOLazone (ZAROXOLYN) 5 mg tablet Take 1 Tab by mouth daily for 30 days.  8/27/20 9/26/20 Yes Anne Schmitz MD   ergocalciferol (Vitamin D2) 1,250 mcg (50,000 unit) capsule Take 1 Cap by mouth every seven (7) days for 90 days. 8/27/20 11/25/20 Yes Tiffany Ibanez MD   levothyroxine (SYNTHROID) 125 mcg tablet Take 125 mcg by mouth daily. 8/4/20  Yes Provider, Historical   omeprazole (PRILOSEC) 20 mg capsule Take 1 capsule by mouth once daily 5/12/20  Yes Tiffany Ibanez MD   atorvastatin (LIPITOR) 40 mg tablet Take 1 Tab by mouth daily. 5/12/20  Yes Tiffany Ibanez MD   albuterol (PROVENTIL HFA, VENTOLIN HFA, PROAIR HFA) 90 mcg/actuation inhaler Albuterol inhaler, 2 puffs up to every 4 hours if needed for wheezing, cough, shortness of breath 5/12/20  Yes Tiffany Ibanez MD   fluticasone propionate (FLOVENT HFA) 110 mcg/actuation inhaler Take 2 Puffs by inhalation every twelve (12) hours. 5/12/20  Yes Tiffany Ibanez MD   vortioxetine (TRINTELLIX) 5 mg tablet Take  by mouth daily. Yes Provider, Historical   acetaminophen (TYLENOL EXTRA STRENGTH) 500 mg tablet Take  by mouth every six (6) hours as needed for Pain. Yes Provider, Historical   DULoxetine (CYMBALTA) 60 mg capsule Take 60 mg by mouth daily. Yes Provider, Historical   gabapentin (NEURONTIN) 300 mg capsule Take 300 mg by mouth three (3) times daily. Yes Provider, Historical   ARIPiprazole (ABILIFY) 10 mg tablet Take 10 mg by mouth daily. Yes Provider, Historical   ALPRAZolam (XANAX) 0.5 mg tablet Take 2 mg by mouth three (3) times daily as needed. Indications: patient taking 2 mg as needed   Yes Provider, Historical   amitriptyline (ELAVIL) 100 mg tablet Take 200 mg by mouth nightly. Provider, Historical   furosemide (LASIX) 40 mg tablet Take 1 Tab by mouth daily. 8/7/20 8/27/20  Tiffany Ibanez MD   ergocalciferol (Vitamin D2) 1,250 mcg (50,000 unit) capsule Take 1 Cap by mouth every seven (7) days for 90 days.  5/12/20 8/27/20  Tiffany Ibanez MD     Patient Active Problem List   Diagnosis Code    Current moderate episode of major depressive disorder ([facility] F32.1    Hyperlipidemia E78.5    Acquired hypothyroidism E03.9    Fibromyalgia M79.7    Obesity (BMI 30-39. 9) E66.9    Tobacco use Z72.0    Gastroesophageal reflux disease K21.9    Lymphocytic colitis K52.832    Vitamin D deficiency E55.9    ISA (generalized anxiety disorder) F41.1    PTSD (post-traumatic stress disorder) F43.10    Panic attack F41.0    Chronic fatigue R53.82    Severe obesity (HCC) E66.01     Patient Active Problem List    Diagnosis Date Noted    Severe obesity ([facility] [address] 08/07/2020    ISA (generalized anxiety disorder) 05/12/2020    PTSD (post-traumatic stress disorder) 05/12/2020    Panic attack 05/12/2020    Chronic fatigue 05/12/2020    Vitamin D deficiency 03/30/2019    Current moderate episode of major depressive disorder ([facility] [address] 12/06/2018    Hyperlipidemia 12/06/2018    Acquired hypothyroidism 12/06/2018    Fibromyalgia 12/06/2018    Obesity (BMI 30-39.9) 12/06/2018    Tobacco use 12/06/2018    Gastroesophageal reflux disease 12/06/2018    Lymphocytic colitis 12/06/2018     Current Outpatient Medications   Medication Sig Dispense Refill    metOLazone (ZAROXOLYN) 5 mg tablet Take 1 Tab by mouth daily for 30 days. 30 Tab 0    ergocalciferol (Vitamin D2) 1,250 mcg (50,000 unit) capsule Take 1 Cap by mouth every seven (7) days for 90 days. 12 Cap 0    levothyroxine (SYNTHROID) 125 mcg tablet Take 125 mcg by mouth daily.  omeprazole (PRILOSEC) 20 mg capsule Take 1 capsule by mouth once daily 90 Cap 1    atorvastatin (LIPITOR) 40 mg tablet Take 1 Tab by mouth daily. 90 Tab 0    albuterol (PROVENTIL HFA, VENTOLIN HFA, PROAIR HFA) 90 mcg/actuation inhaler Albuterol inhaler, 2 puffs up to every 4 hours if needed for wheezing, cough, shortness of breath 1 Inhaler 11    fluticasone propionate (FLOVENT HFA) 110 mcg/actuation inhaler Take 2 Puffs by inhalation every twelve (12) hours. 1 Inhaler 11    vortioxetine (TRINTELLIX) 5 mg tablet Take  by mouth daily.       acetaminophen (TYLENOL EXTRA STRENGTH) 500 mg tablet Take  by mouth every six (6) hours as needed for Pain.  DULoxetine (CYMBALTA) 60 mg capsule Take 60 mg by mouth daily.  gabapentin (NEURONTIN) 300 mg capsule Take 300 mg by mouth three (3) times daily.  ARIPiprazole (ABILIFY) 10 mg tablet Take 10 mg by mouth daily.  ALPRAZolam (XANAX) 0.5 mg tablet Take 2 mg by mouth three (3) times daily as needed. Indications: patient taking 2 mg as needed      amitriptyline (ELAVIL) 100 mg tablet Take 200 mg by mouth nightly. Allergies   Allergen Reactions    Bactrim [Sulfamethoprim] Unknown (comments)    Bactrim [Sulfamethoprim] Other (comments)     Thrush     Past Medical History:   Diagnosis Date    Breast cyst     Depression     Emphysema lung ([address]     1 year and a half it was mentioned by ER Doc.  Fibromyalgia     Hyperlipidemia     Hypothyroidism     Ovarian cyst      Past Surgical History:   Procedure Laterality Date    BREAST SURGERY PROCEDURE UNLISTED      HX APPENDECTOMY      HX GYN      Cyst, Uterine Ablation    HX TUBAL LIGATION      IMPLANT BREAST SILICONE/EQ Bilateral     Approximately 1999     Family History   Problem Relation Age of Onset    Heart Disease Mother     Heart Disease Father     Heart Disease Sister     MS Sister     Heart Disease Brother         3 heart attacks    Cancer Paternal Aunt         breast    Breast Cancer Maternal Aunt     Diabetes Neg Hx     Hypertension Neg Hx      Social History     Tobacco Use    Smoking status: Former Smoker     Packs/day: 0.50    Smokeless tobacco: Current User    Tobacco comment: patient stated she quit three weeks ago cig and now uses vape   Substance Use Topics    Alcohol use: Yes     Comment: occ       Review of Systems   Constitutional: Negative for chills, fever, malaise/fatigue and weight loss. HENT: Negative for congestion, ear discharge, ear pain, hearing loss, nosebleeds, sinus pain and sore throat. Eyes: Negative for blurred vision, double vision and discharge.    Respiratory: Positive for cough and shortness of breath. Negative for hemoptysis, sputum production and wheezing. Cardiovascular: Positive for leg swelling. Negative for chest pain, palpitations and claudication. Gastrointestinal: Negative for abdominal pain, constipation, diarrhea, nausea and vomiting. Genitourinary: Negative for dysuria, frequency and urgency. Musculoskeletal: Negative for back pain, joint pain, myalgias and neck pain. Skin: Negative for itching and rash. Neurological: Negative for dizziness, tingling, sensory change, speech change, focal weakness, weakness and headaches. Objective:     Patient-Reported Vitals 6/17/2020   Patient-Reported LMP Utrine ablasion 2014  not mensturating       General: alert, cooperative, no distress   Mental  status: normal mood, behavior, speech, dress, motor activity, and thought processes, able to follow commands   HENT: NCAT   Neck: no visualized mass   Resp: no respiratory distress   Neuro: no gross deficits   Skin: no discoloration or lesions of concern on visible areas   Psychiatric: normal affect, consistent with stated mood, no evidence of hallucinations     Additional exam findings: We discussed the expected course, resolution and complications of the diagnosis(es) in detail. Medication risks, benefits, costs, interactions, and alternatives were discussed as indicated. I advised her to contact the office if her condition worsens, changes or fails to improve as anticipated. She expressed understanding with the diagnosis(es) and plan. Padma Figueroa, who was evaluated through a patient-initiated, synchronous (real-time) audio-video encounter, and/or her healthcare decision maker, is aware that it is a billable service, with coverage as determined by her insurance carrier. She provided verbal consent to proceed: Yes, and patient identification was verified.  It was conducted pursuant to the emergency declaration under the [employer] and the National Emergencies Act, [facility] waiver authority and the [employer] and [employer] General Act. A caregiver was present when appropriate. Ability to conduct physical exam was limited. I was in the office. The patient was at home.       Rahat Saavedra MD

## 2020-08-27 NOTE — TELEPHONE ENCOUNTER
Riaz Chamberlain MD  Psychiatry 05/12/2020 End       Please call the office I need to speak to her psych

## 2020-08-28 ENCOUNTER — TELEPHONE (OUTPATIENT)
Dept: FAMILY MEDICINE CLINIC | Age: 50
End: 2020-08-28

## 2020-08-28 NOTE — TELEPHONE ENCOUNTER
Patient called stating that she wanted to know if [facility] tested her for \"HAE\" or \"AAE\". She just had a hospital follow up with Alida Bass and wanted to know if the nurse could see that in her chart?

## 2020-08-28 NOTE — TELEPHONE ENCOUNTER
Spoke with patient and she stated that her niece looked up these lab test on the Internet and that she may have Hereditary Angioedema. I advised her we do not like patient looking up diganosis because some symptoms can be the same for anything else.   She wants the testing

## 2020-09-03 LAB — COLONOSCOPY, EXTERNAL: NORMAL

## 2020-09-03 NOTE — TELEPHONE ENCOUNTER
Carlie Dey called back and left message with his cell phone number. I wrote it down for Dr. Alex Agrawal this morning.

## 2020-09-15 ENCOUNTER — TELEPHONE (OUTPATIENT)
Dept: FAMILY MEDICINE CLINIC | Age: 50
End: 2020-09-15

## 2020-09-15 NOTE — TELEPHONE ENCOUNTER
Pt is calling trying to get an in-office appt due to her feet being swollen. Stated that the currently medication is not having her with the issue.  Pt was offered a v.v appt but declined stated she wanted to be seen in person Please advise

## 2020-09-15 NOTE — TELEPHONE ENCOUNTER
Patient called in this morning stating that she has more swelling in her feet and she can't feel her feet. We don't have any openings today at all or tomorrow.   I suggested the ER but I told her I will address this with you first.

## 2020-09-16 NOTE — TELEPHONE ENCOUNTER
Pt went to [facility] regarding the swelling in her feet.  Pt stated she needs a referral for echochronologist

## 2020-09-18 ENCOUNTER — OFFICE VISIT (OUTPATIENT)
Dept: FAMILY MEDICINE CLINIC | Age: 50
End: 2020-09-18

## 2020-09-18 VITALS
DIASTOLIC BLOOD PRESSURE: 84 MMHG | TEMPERATURE: 97.8 F | SYSTOLIC BLOOD PRESSURE: 119 MMHG | HEART RATE: 90 BPM | RESPIRATION RATE: 16 BRPM | BODY MASS INDEX: 38.32 KG/M2 | WEIGHT: 230 LBS | HEIGHT: 65 IN | OXYGEN SATURATION: 98 %

## 2020-09-18 DIAGNOSIS — R93.1 ABNORMAL ECHOCARDIOGRAM: ICD-10-CM

## 2020-09-18 DIAGNOSIS — I50.33 DIASTOLIC CHF, ACUTE ON CHRONIC (HCC): ICD-10-CM

## 2020-09-18 DIAGNOSIS — R06.02 SHORTNESS OF BREATH: ICD-10-CM

## 2020-09-18 DIAGNOSIS — E66.01 SEVERE OBESITY (HCC): ICD-10-CM

## 2020-09-18 DIAGNOSIS — R60.1 GENERALIZED EDEMA: Primary | ICD-10-CM

## 2020-09-18 RX ORDER — FUROSEMIDE 40 MG/1
40 TABLET ORAL DAILY
Qty: 30 TAB | Refills: 1 | Status: SHIPPED | OUTPATIENT
Start: 2020-09-18 | End: 2020-11-13

## 2020-09-18 NOTE — PROGRESS NOTES
Jose Horvath     Chief Complaint   Patient presents with    Swelling     in feet     Vitals:    09/18/20 1322   BP: 119/84   Pulse: 90   Resp: 16   Temp: 97.8 °F (36.6 °C)   TempSrc: Temporal   SpO2: 98%   Weight: 230 lb (104.3 kg)   Height: 5' 5\" (1.651 m)   PainSc:   8   PainLoc: Foot         HPI:Pateint is here for follow up after recent ER visit on 19/15  She had work up with no significant findings ,alsr previous work up including CT abdomen and pelvis with contrast with no significant finding except signs  fatty liver  Liver functions are normal,  CXR was normal , TSH normal as well as renal function and urine is negative for protein. Echo result   NORMAL GLOBAL LEFT VENTRICULAR SYSTOLIC FUNCTION. EJECTION FRACTION 57% BY GARCIA'S. STAGE I (MILD) DIASTOLIC DYSFUNCTION. NO HEMODYNAMICALLY SIGNIFICANT VALVULAR PATHOLOGY. Past Medical History:   Diagnosis Date    Breast cyst     Depression     Emphysema lung ([address]     1 year and a half it was mentioned by ER Doc.     Fibromyalgia     Hyperlipidemia     Hypothyroidism     Ovarian cyst      Past Surgical History:   Procedure Laterality Date    BREAST SURGERY PROCEDURE UNLISTED      HX APPENDECTOMY      HX GYN      Cyst, Uterine Ablation    HX TUBAL LIGATION      IMPLANT BREAST SILICONE/EQ Bilateral     Approximately 1999     Social History     Tobacco Use    Smoking status: Former Smoker     Packs/day: 0.50    Smokeless tobacco: Current User    Tobacco comment: patient stated she quit three weeks ago cig and now uses vape   Substance Use Topics    Alcohol use: Yes     Comment: occ       Family History   Problem Relation Age of Onset    Heart Disease Mother     Heart Disease Father     Heart Disease Sister     MS Sister     Heart Disease Brother         3 heart attacks    Cancer Paternal Aunt         breast    Breast Cancer Maternal Aunt     Diabetes Neg Hx     Hypertension Neg Hx        Review of Systems   Constitutional: Negative for chills, fever, malaise/fatigue and weight loss. HENT: Negative for congestion, ear discharge, ear pain, hearing loss, nosebleeds, sinus pain and sore throat. Eyes: Negative for blurred vision, double vision and discharge. Respiratory: Positive for shortness of breath. Negative for cough, hemoptysis, sputum production and wheezing. Cardiovascular: Positive for leg swelling. Negative for chest pain, palpitations and claudication. Gastrointestinal: Negative for abdominal pain, constipation, diarrhea, nausea and vomiting. Genitourinary: Negative for dysuria, frequency and urgency. Musculoskeletal: Negative for back pain, falls, joint pain, myalgias and neck pain. Skin: Negative for itching and rash. Neurological: Negative for dizziness, tingling, sensory change, speech change, focal weakness, weakness and headaches. Psychiatric/Behavioral: Positive for depression. Negative for hallucinations, substance abuse and suicidal ideas. The patient is nervous/anxious. Physical Exam  Constitutional:       General: She is not in acute distress. Appearance: She is well-developed. She is not diaphoretic. HENT:      Head: Normocephalic and atraumatic. Mouth/Throat:      Pharynx: No oropharyngeal exudate. Eyes:      General: No scleral icterus. Right eye: No discharge. Left eye: No discharge. Conjunctiva/sclera: Conjunctivae normal.      Pupils: Pupils are equal, round, and reactive to light. Neck:      Thyroid: No thyromegaly. Cardiovascular:      Rate and Rhythm: Normal rate and regular rhythm. Heart sounds: Normal heart sounds. Pulmonary:      Effort: Pulmonary effort is normal. No respiratory distress. Breath sounds: Normal breath sounds. No wheezing or rales. Chest:      Chest wall: No tenderness. Abdominal:      General: There is no distension. Palpations: Abdomen is soft. Tenderness: There is no abdominal tenderness.  There is no rebound. Musculoskeletal: Normal range of motion. General: Tenderness present. No deformity. Right lower leg: Edema present. Left lower leg: Edema present. Lymphadenopathy:      Cervical: No cervical adenopathy. Skin:     General: Skin is warm and dry. Coloration: Skin is not pale. Findings: No erythema or rash. Neurological:      Mental Status: She is alert and oriented to person, place, and time. Cranial Nerves: No cranial nerve deficit. Coordination: Coordination normal.   Psychiatric:         Behavior: Behavior normal.         Thought Content: Thought content normal.         Judgment: Judgment normal.          Assessment and plan     Plan of care has been discussed with the patient, he agrees to the plan and verbalized understanding. All his questions were answered  More than 50% of the time spent in this visit was counseling the patient about  illness and treatment options         1. Generalized edema       - REFERRAL TO CARDIOLOGY  - furosemide (LASIX) 40 mg tablet; Take 1 Tab by mouth daily. Dispense: 30 Tab; Refill: 1  - CT CHEST W CONT; Future    2. Severe obesity ([address]      3. Abnormal echocardiogram    - furosemide (LASIX) 40 mg tablet; Take 1 Tab by mouth daily. Dispense: 30 Tab; Refill: 1    4. Diastolic CHF, acute on chronic (HCC)    - REFERRAL TO CARDIOLOGY    5. Shortness of breath  Rule out lungs pathology or malignancy     - CT CHEST W CONT; Future    Current Outpatient Medications   Medication Sig Dispense Refill    furosemide (LASIX) 40 mg tablet Take 1 Tab by mouth daily. 30 Tab 1    ergocalciferol (Vitamin D2) 1,250 mcg (50,000 unit) capsule Take 1 Cap by mouth every seven (7) days for 90 days. 12 Cap 0    amitriptyline (ELAVIL) 100 mg tablet Take 200 mg by mouth nightly.  levothyroxine (SYNTHROID) 125 mcg tablet Take 125 mcg by mouth daily.       omeprazole (PRILOSEC) 20 mg capsule Take 1 capsule by mouth once daily 90 Cap 1    atorvastatin (LIPITOR) 40 mg tablet Take 1 Tab by mouth daily. 90 Tab 0    albuterol (PROVENTIL HFA, VENTOLIN HFA, PROAIR HFA) 90 mcg/actuation inhaler Albuterol inhaler, 2 puffs up to every 4 hours if needed for wheezing, cough, shortness of breath 1 Inhaler 11    fluticasone propionate (FLOVENT HFA) 110 mcg/actuation inhaler Take 2 Puffs by inhalation every twelve (12) hours. 1 Inhaler 11    vortioxetine (TRINTELLIX) 5 mg tablet Take  by mouth daily.  acetaminophen (TYLENOL EXTRA STRENGTH) 500 mg tablet Take  by mouth every six (6) hours as needed for Pain.  DULoxetine (CYMBALTA) 60 mg capsule Take 60 mg by mouth daily.  gabapentin (NEURONTIN) 300 mg capsule Take 300 mg by mouth three (3) times daily.  ARIPiprazole (ABILIFY) 10 mg tablet Take 10 mg by mouth daily.  ALPRAZolam (XANAX) 0.5 mg tablet Take 2 mg by mouth three (3) times daily as needed. Indications: patient taking 2 mg as needed         Patient Active Problem List    Diagnosis Date Noted    [facility] 08/07/2020    ISA (generalized anxiety disorder) 05/12/2020    PTSD (post-traumatic stress disorder) 05/12/2020    Panic attack 05/12/2020    Chronic fatigue 05/12/2020    Vitamin D deficiency 03/30/2019    Current moderate episode of major depressive disorder ([facility] 12/06/2018    Hyperlipidemia 12/06/2018    Acquired hypothyroidism 12/06/2018    Fibromyalgia 12/06/2018    Obesity (BMI 30-39.9) 12/06/2018    Tobacco use 12/06/2018    Gastroesophageal reflux disease 12/06/2018    Lymphocytic colitis 12/06/2018     Results for orders placed or performed during the hospital encounter of 08/07/20   HEPATIC FUNCTION PANEL   Result Value Ref Range    Protein, total 6.6 6.4 - 8.2 g/dL    Albumin 3.7 3.4 - 5.0 g/dL    Globulin 2.9 2.0 - 4.0 g/dL    A-G Ratio 1.3 0.8 - 1.7      Bilirubin, total 1.1 (H) 0.2 - 1.0 MG/DL    Bilirubin, direct 0.1 0.0 - 0.2 MG/DL    Alk.  phosphatase 121 (H) 45 - 117 U/L    AST (SGOT) 28 10 - 38 U/L    ALT (SGPT) 88 (H) 13 - 56 U/L     Hospital Outpatient Visit on 08/07/2020   Component Date Value Ref Range Status    Protein, total 08/07/2020 6.6  6.4 - 8.2 g/dL Final    Albumin 08/07/2020 3.7  3.4 - 5.0 g/dL Final    Globulin 08/07/2020 2.9  2.0 - 4.0 g/dL Final    A-G Ratio 08/07/2020 1.3  0.8 - 1.7   Final    Bilirubin, total 08/07/2020 1.1* 0.2 - 1.0 MG/DL Final    Bilirubin, direct 08/07/2020 0.1  0.0 - 0.2 MG/DL Final    Alk. phosphatase 08/07/2020 121* 45 - 117 U/L Final    AST (SGOT) 08/07/2020 28  10 - 38 U/L Final    ALT (SGPT) 08/07/2020 88* 13 - 56 U/L Final          Follow-up and Dispositions    · Return if symptoms worsen or fail to improve.

## 2020-09-18 NOTE — PROGRESS NOTES
Yann Streeter is a 44 y.o. female (: 1970) presenting to address:    Chief Complaint   Patient presents with    Swelling     in feet       Vitals:    20 1322   BP: 119/84   Pulse: 90   Resp: 16   Temp: 97.8 °F (36.6 °C)   TempSrc: Temporal   SpO2: 98%   Weight: 230 lb (104.3 kg)   Height: 5' 5\" (1.651 m)   PainSc:   8   PainLoc: Foot       Hearing/Vision:   No exam data present    Learning Assessment:     Learning Assessment 2018   PRIMARY LEARNER Patient   HIGHEST LEVEL OF EDUCATION - PRIMARY LEARNER  DID NOT GRADUATE HIGH SCHOOL   BARRIERS PRIMARY LEARNER NONE   CO-LEARNER CAREGIVER No   PRIMARY LANGUAGE ENGLISH   LEARNER PREFERENCE PRIMARY LISTENING   ANSWERED BY patient   RELATIONSHIP SELF     Depression Screening:     3 most recent PHQ Screens 2018   Little interest or pleasure in doing things Several days   Feeling down, depressed, irritable, or hopeless Several days   Total Score PHQ 2 2     Fall Risk Assessment:     Fall Risk Assessment, last 12 mths 2020   Able to walk? Yes   Fall in past 12 months? No     Abuse Screening:     Abuse Screening Questionnaire 2020   Do you ever feel afraid of your partner? N   Are you in a relationship with someone who physically or mentally threatens you? N   Is it safe for you to go home? Y     Coordination of Care Questionaire:   1. Have you been to the ER, urgent care clinic since your last visit? Hospitalized since your last visit? YES, twice    2. Have you seen or consulted any other health care providers outside of the [address] [place] [address] since your last visit? Include any pap smears or colon screening. NO    Advanced Directive:   1. Do you have an Advanced Directive? NO    2. Would you like information on Advanced Directives?  NO

## 2020-09-21 ENCOUNTER — TELEPHONE (OUTPATIENT)
Dept: FAMILY MEDICINE CLINIC | Age: 50
End: 2020-09-21

## 2020-09-21 NOTE — TELEPHONE ENCOUNTER
Patient needs a referral stating stat so that she can get in sooner due to her swollen legs and the pain she is in.

## 2020-09-21 NOTE — TELEPHONE ENCOUNTER
Patient called stating that she saw Jeimy Zhang September 18th for swollen feet. She states that she has been taking the medication that Jeimy Zhang has given her and it is not working. Patient would like to know what to do next.

## 2020-09-22 NOTE — TELEPHONE ENCOUNTER
Patient called back this morning wanting to speak to [facility]. Her legs are getting worse and she needs to be seen by someone.

## 2020-09-23 NOTE — TELEPHONE ENCOUNTER
Patient has appt with Dr. Ailyn Hall October 21st. Patient has been made aware of that yesterday. Roselia Parnell that is ok. Other Cardiologist are booked from November to December. I advise her she can go to Urgent Care.

## 2020-09-28 ENCOUNTER — HOSPITAL ENCOUNTER (OUTPATIENT)
Dept: CT IMAGING | Age: 50
Discharge: HOME | End: 2020-09-28
Attending: LEGAL MEDICINE
Payer: MEDICAID

## 2020-09-28 DIAGNOSIS — R60.1 GENERALIZED EDEMA: ICD-10-CM

## 2020-09-28 DIAGNOSIS — R06.02 SHORTNESS OF BREATH: ICD-10-CM

## 2020-09-28 PROCEDURE — 71260 CT THORAX DX C+: CPT

## 2020-09-28 PROCEDURE — 74011000636 HC RX REV CODE- 636: Performed by: LEGAL MEDICINE

## 2020-09-28 RX ADMIN — IOPAMIDOL 100 ML: 612 INJECTION, SOLUTION INTRAVENOUS at 13:46

## 2020-09-30 ENCOUNTER — TELEPHONE (OUTPATIENT)
Dept: FAMILY MEDICINE CLINIC | Age: 50
End: 2020-09-30

## 2020-09-30 NOTE — TELEPHONE ENCOUNTER
Patient would like her results for her CT scan she got last week. I advised her you did not release them to her mychart yet. Please advise, thank you.

## 2020-10-02 NOTE — TELEPHONE ENCOUNTER
She has small nodules in the lung not indication any serious concern at this time ,to follow up in 6 to 12 month with anther CT     For any further concerns schedule VV

## 2020-10-02 NOTE — TELEPHONE ENCOUNTER
Patient states she is calling for the third time about the results of her CT scan that was completed last week and she is concerned. Patient is requesting a call back re: results.

## 2020-10-13 DIAGNOSIS — E78.5 HYPERLIPIDEMIA, UNSPECIFIED HYPERLIPIDEMIA TYPE: ICD-10-CM

## 2020-10-13 DIAGNOSIS — K21.9 GASTROESOPHAGEAL REFLUX DISEASE: ICD-10-CM

## 2020-10-13 RX ORDER — OMEPRAZOLE 20 MG/1
CAPSULE, DELAYED RELEASE ORAL
Qty: 90 CAP | Refills: 0 | Status: SHIPPED | OUTPATIENT
Start: 2020-10-13 | End: 2020-10-20

## 2020-10-13 RX ORDER — ATORVASTATIN CALCIUM 40 MG/1
TABLET, FILM COATED ORAL
Qty: 90 TAB | Refills: 0 | Status: SHIPPED | OUTPATIENT
Start: 2020-10-13 | End: 2020-11-13

## 2020-10-16 ENCOUNTER — TELEPHONE (OUTPATIENT)
Dept: FAMILY MEDICINE CLINIC | Age: 50
End: 2020-10-16

## 2020-10-16 DIAGNOSIS — K21.9 GASTROESOPHAGEAL REFLUX DISEASE WITHOUT ESOPHAGITIS: Primary | ICD-10-CM

## 2020-10-16 NOTE — TELEPHONE ENCOUNTER
[employer] denied patients Omeprazole.  Based on patient not have done upper gastrointestinal testing in the previous 2 year period

## 2020-10-20 RX ORDER — FAMOTIDINE 40 MG/1
40 TABLET, FILM COATED ORAL DAILY
Qty: 90 TAB | Refills: 0 | Status: SHIPPED | OUTPATIENT
Start: 2020-10-20 | End: 2020-11-13

## 2020-10-21 ENCOUNTER — OFFICE VISIT (OUTPATIENT)
Dept: CARDIOLOGY CLINIC | Age: 50
End: 2020-10-21
Payer: MEDICAID

## 2020-10-21 VITALS
WEIGHT: 230.6 LBS | DIASTOLIC BLOOD PRESSURE: 61 MMHG | RESPIRATION RATE: 16 BRPM | TEMPERATURE: 98.3 F | BODY MASS INDEX: 38.42 KG/M2 | HEIGHT: 65 IN | HEART RATE: 93 BPM | SYSTOLIC BLOOD PRESSURE: 81 MMHG | OXYGEN SATURATION: 93 %

## 2020-10-21 DIAGNOSIS — R00.2 PALPITATIONS: Primary | ICD-10-CM

## 2020-10-21 DIAGNOSIS — M79.7 FIBROMYALGIA: ICD-10-CM

## 2020-10-21 DIAGNOSIS — F41.0 PANIC ATTACK: ICD-10-CM

## 2020-10-21 DIAGNOSIS — K21.9 GASTROESOPHAGEAL REFLUX DISEASE, UNSPECIFIED WHETHER ESOPHAGITIS PRESENT: ICD-10-CM

## 2020-10-21 DIAGNOSIS — R60.9 EDEMA, UNSPECIFIED TYPE: ICD-10-CM

## 2020-10-21 DIAGNOSIS — E78.5 HYPERLIPIDEMIA, UNSPECIFIED HYPERLIPIDEMIA TYPE: ICD-10-CM

## 2020-10-21 DIAGNOSIS — Z72.0 TOBACCO USE: ICD-10-CM

## 2020-10-21 PROCEDURE — 99204 OFFICE O/P NEW MOD 45 MIN: CPT | Performed by: INTERNAL MEDICINE

## 2020-10-21 NOTE — PROGRESS NOTES
Deneen Woods presents today for   Chief Complaint   Patient presents with   [facility] New Patient       Deneen Woods preferred language for health care discussion is english/other. Personal Protective Equipment:   Personal Protective Equipment was used including: mask-surgical and hands-gloves. Patient was placed on no precaution(s). Patient was masked. Is someone accompanying this pt? No    Is the patient using any DME equipment during OV? No    Depression Screening:  3 most recent PHQ Screens 9/18/2020   Little interest or pleasure in doing things Several days   Feeling down, depressed, irritable, or hopeless Several days   Total Score PHQ 2 2       Learning Assessment:  Learning Assessment 12/6/2018   PRIMARY LEARNER Patient   HIGHEST LEVEL OF EDUCATION - PRIMARY LEARNER  DID NOT GRADUATE HIGH SCHOOL   BARRIERS PRIMARY LEARNER NONE   CO-LEARNER CAREGIVER No   PRIMARY LANGUAGE ENGLISH   LEARNER PREFERENCE PRIMARY LISTENING   ANSWERED BY patient   RELATIONSHIP SELF       Abuse Screening:  Abuse Screening Questionnaire 8/7/2020   Do you ever feel afraid of your partner? N   Are you in a relationship with someone who physically or mentally threatens you? N   Is it safe for you to go home? Y       Fall Risk  Fall Risk Assessment, last 12 mths 8/7/2020   Able to walk? Yes   Fall in past 12 months? No       Pt currently taking Anticoagulant therapy? No    Coordination of Care:  1. Have you been to the ER, urgent care clinic since your last visit? Hospitalized since your last visit? No    2. Have you seen or consulted any other health care providers outside of the [address] [place] [address] since your last visit? Include any pap smears or colon screening.  No

## 2020-10-21 NOTE — PATIENT INSTRUCTIONS
Testing Echocardiogram 
 24 HR Holter Please call Alexandre ventura at [phone]  to schedule an appointment. All testing is completed at [facility], [facility] access hospital **call office 5-7 days after testing for results**

## 2020-10-27 ENCOUNTER — OFFICE VISIT (OUTPATIENT)
Dept: FAMILY MEDICINE CLINIC | Age: 50
End: 2020-10-27
Payer: MEDICAID

## 2020-10-27 VITALS
OXYGEN SATURATION: 99 % | TEMPERATURE: 97.6 F | BODY MASS INDEX: 38.49 KG/M2 | SYSTOLIC BLOOD PRESSURE: 104 MMHG | RESPIRATION RATE: 16 BRPM | HEIGHT: 65 IN | WEIGHT: 231 LBS | HEART RATE: 79 BPM | DIASTOLIC BLOOD PRESSURE: 67 MMHG

## 2020-10-27 DIAGNOSIS — Z72.0 TOBACCO USE: ICD-10-CM

## 2020-10-27 DIAGNOSIS — E78.5 HYPERLIPIDEMIA, UNSPECIFIED HYPERLIPIDEMIA TYPE: ICD-10-CM

## 2020-10-27 DIAGNOSIS — F32.1 CURRENT MODERATE EPISODE OF MAJOR DEPRESSIVE DISORDER, UNSPECIFIED WHETHER RECURRENT (HCC): ICD-10-CM

## 2020-10-27 DIAGNOSIS — K21.9 GASTROESOPHAGEAL REFLUX DISEASE WITHOUT ESOPHAGITIS: ICD-10-CM

## 2020-10-27 DIAGNOSIS — F43.10 PTSD (POST-TRAUMATIC STRESS DISORDER): ICD-10-CM

## 2020-10-27 DIAGNOSIS — E03.9 ACQUIRED HYPOTHYROIDISM: ICD-10-CM

## 2020-10-27 DIAGNOSIS — E66.01 SEVERE OBESITY (HCC): ICD-10-CM

## 2020-10-27 DIAGNOSIS — R60.1 GENERALIZED EDEMA: Primary | ICD-10-CM

## 2020-10-27 PROBLEM — R00.2 PALPITATIONS: Status: ACTIVE | Noted: 2020-10-27

## 2020-10-27 PROBLEM — R06.09 DOE (DYSPNEA ON EXERTION): Status: ACTIVE | Noted: 2020-10-27

## 2020-10-27 PROBLEM — N20.0 NEPHROLITHIASIS: Status: ACTIVE | Noted: 2020-10-27

## 2020-10-27 PROCEDURE — 99214 OFFICE O/P EST MOD 30 MIN: CPT | Performed by: LEGAL MEDICINE

## 2020-10-27 RX ORDER — IBUPROFEN 200 MG
1 TABLET ORAL EVERY 24 HOURS
Qty: 30 PATCH | Refills: 0 | Status: SHIPPED | OUTPATIENT
Start: 2020-10-27 | End: 2020-11-26

## 2020-10-27 NOTE — PROGRESS NOTES
Judah Juan is a 44 y.o. female (: 1970) presenting to address:    Chief Complaint   Patient presents with    Leg Swelling       Vitals:    10/27/20 1322   BP: 104/67   Pulse: 79   Resp: 16   Temp: 97.6 °F (36.4 °C)   SpO2: 99%   Weight: 231 lb (104.8 kg)   Height: 5' 5\" (1.651 m)       Hearing/Vision:   No exam data present    Learning Assessment:     Learning Assessment 2018   PRIMARY LEARNER Patient   HIGHEST LEVEL OF EDUCATION - PRIMARY LEARNER  DID NOT GRADUATE HIGH SCHOOL   BARRIERS PRIMARY LEARNER NONE   CO-LEARNER CAREGIVER No   PRIMARY LANGUAGE ENGLISH   LEARNER PREFERENCE PRIMARY LISTENING   ANSWERED BY patient   RELATIONSHIP SELF     Depression Screening:     3 most recent PHQ Screens 2020   Little interest or pleasure in doing things Several days   Feeling down, depressed, irritable, or hopeless Several days   Total Score PHQ 2 2     Fall Risk Assessment:     Fall Risk Assessment, last 12 mths 2020   Able to walk? Yes   Fall in past 12 months? No     Abuse Screening:     Abuse Screening Questionnaire 2020   Do you ever feel afraid of your partner? N   Are you in a relationship with someone who physically or mentally threatens you? N   Is it safe for you to go home? Y     Coordination of Care Questionaire:   1. Have you been to the ER, urgent care clinic since your last visit? Hospitalized since your last visit? YES    2. Have you seen or consulted any other health care providers outside of the [address] [place] [address] since your last visit? Include any pap smears or colon screening. NO    Advanced Directive:   1. Do you have an Advanced Directive? NO    2. Would you like information on Advanced Directives?  NO

## 2020-10-27 NOTE — PROGRESS NOTES
Deepika Olson     Chief Complaint   Patient presents with    Leg Swelling     Vitals:    10/27/20 1322   BP: 104/67   Pulse: 79   Resp: 16   Temp: 97.6 °F (36.4 °C)   SpO2: 99%   Weight: 231 lb (104.8 kg)   Height: 5' 5\" (1.651 m)         HPI:patient is here for follow up     Was seen in ER sep 23 for leg swelling and also she felt numbness on her toes, patient was given IV furosemide and then she was discharged. Patient still having edema mainly in her lower legs, and to a lesser extent on her hands. She is taking. Furosemide regularly, discussed with patient to restrict salt intake, she is having occasional shortness of breath she has seen cardiologist and getting echocardiogram and a Holter monitor,        Recommendation to be discussed with patient psychiatrist to decrease gabapentin dose. The culture may be secondary to gabapentin if cardiac workup is not consistent with heart failure. Past Medical History:   Diagnosis Date    Breast cyst     Depression     Emphysema lung ([address]     1 year and a half it was mentioned by ER Doc.     Fibromyalgia     Hyperlipidemia     Hypothyroidism     Ovarian cyst      Past Surgical History:   Procedure Laterality Date    BREAST SURGERY PROCEDURE UNLISTED      HX APPENDECTOMY      HX GYN      Cyst, Uterine Ablation    HX TUBAL LIGATION      IMPLANT BREAST SILICONE/EQ Bilateral     Approximately 1999     Social History     Tobacco Use    Smoking status: Former Smoker     Packs/day: 0.50    Smokeless tobacco: Current User    Tobacco comment: patient stated she quit three weeks ago cig and now uses vape   Substance Use Topics    Alcohol use: Yes     Comment: occ       Family History   Problem Relation Age of Onset    Heart Disease Mother     Heart Disease Father     Heart Disease Sister     MS Sister     Heart Disease Brother         3 heart attacks    Cancer Paternal Aunt         breast    Breast Cancer Maternal Aunt     Diabetes Neg Hx     Hypertension Neg Hx        Review of Systems   Constitutional: Negative for chills, fever, malaise/fatigue and weight loss. HENT: Negative for congestion, ear discharge, ear pain, hearing loss, nosebleeds, sinus pain and sore throat. Eyes: Negative for blurred vision, double vision and discharge. Respiratory: Positive for cough, shortness of breath and wheezing. Negative for hemoptysis and sputum production. Cardiovascular: Positive for leg swelling. Negative for chest pain, palpitations and claudication. Gastrointestinal: Negative for abdominal pain, constipation, diarrhea, nausea and vomiting. Genitourinary: Negative for dysuria, frequency and urgency. Musculoskeletal: Positive for joint pain and myalgias. Negative for back pain, falls and neck pain. Skin: Negative for itching and rash. Neurological: Positive for tingling. Negative for dizziness, sensory change, speech change, focal weakness, weakness and headaches. Psychiatric/Behavioral: Negative for depression and suicidal ideas. Physical Exam  Vitals signs and nursing note reviewed. Constitutional:       General: She is not in acute distress. Appearance: She is well-developed. She is not diaphoretic. HENT:      Head: Normocephalic and atraumatic. Eyes:      General: No scleral icterus. Right eye: No discharge. Left eye: No discharge. Conjunctiva/sclera: Conjunctivae normal.      Pupils: Pupils are equal, round, and reactive to light. Neck:      Thyroid: No thyromegaly. Cardiovascular:      Rate and Rhythm: Normal rate and regular rhythm. Heart sounds: Normal heart sounds. Pulmonary:      Effort: Pulmonary effort is normal. No respiratory distress. Breath sounds: Normal breath sounds. No wheezing or rales. Chest:      Chest wall: No tenderness. Abdominal:      General: There is no distension. Palpations: Abdomen is soft. Tenderness: There is no abdominal tenderness.  There is no rebound. Musculoskeletal: Normal range of motion. General: Swelling and tenderness present. No deformity. Lymphadenopathy:      Cervical: No cervical adenopathy. Skin:     General: Skin is warm and dry. Coloration: Skin is not pale. Findings: No erythema or rash. Neurological:      Mental Status: She is alert and oriented to person, place, and time. Cranial Nerves: No cranial nerve deficit. Coordination: Coordination normal.   Psychiatric:         Behavior: Behavior normal.         Thought Content: Thought content normal.         Judgment: Judgment normal.          Assessment and plan     Plan of care has been discussed with the patient, he agrees to the plan and verbalized understanding. All his questions were answered  More than 50% of the time spent in this visit was counseling the patient about  illness and treatment options         1. Generalized edema    Patient is requesting evaluation by endocrinology due to her history of hypothyroidism    Thyroid function is normal    - REFERRAL TO ENDOCRINOLOGY    2. Acquired hypothyroidism    - REFERRAL TO ENDOCRINOLOGY    3. Hyperlipidemia, unspecified hyperlipidemia type      4. Gastroesophageal reflux disease without esophagitis      5. Severe obesity ([address]    Lifestyle modification has been discussed with patient in details, decrease carbohydrates, decrease or eliminate sugar intake, gradually increase physical activity as tolerated to be about 4 to 5 hours a week. 6. Current moderate episode of major depressive disorder, unspecified whether recurrent ([address]  Stable she is following up with Dr. Smith Goodman  7. PTSD (post-traumatic stress disorder)    Stable she is following up with Dr. Smith Goodman  8. Tobacco use  Patient is ready to quit smoking    Continue nicotine patches for 1 month and then reevaluate for dose of 14.  - nicotine (NICODERM CQ) 21 mg/24 hr; 1 Patch by TransDERmal route every twenty-four (24) hours for 30 days. Dispense: 30 Patch; Refill: 0    Current Outpatient Medications   Medication Sig Dispense Refill    nicotine (NICODERM CQ) 21 mg/24 hr 1 Patch by TransDERmal route every twenty-four (24) hours for 30 days. 30 Patch 0    famotidine (PEPCID) 40 mg tablet Take 1 Tab by mouth daily for 90 days. 90 Tab 0    atorvastatin (LIPITOR) 40 mg tablet Take 1 tablet by mouth once daily 90 Tab 0    furosemide (LASIX) 40 mg tablet Take 1 Tab by mouth daily. 30 Tab 1    ergocalciferol (Vitamin D2) 1,250 mcg (50,000 unit) capsule Take 1 Cap by mouth every seven (7) days for 90 days. 12 Cap 0    amitriptyline (ELAVIL) 100 mg tablet Take 200 mg by mouth nightly.  levothyroxine (SYNTHROID) 125 mcg tablet Take 125 mcg by mouth daily.  albuterol (PROVENTIL HFA, VENTOLIN HFA, PROAIR HFA) 90 mcg/actuation inhaler Albuterol inhaler, 2 puffs up to every 4 hours if needed for wheezing, cough, shortness of breath 1 Inhaler 11    fluticasone propionate (FLOVENT HFA) 110 mcg/actuation inhaler Take 2 Puffs by inhalation every twelve (12) hours. 1 Inhaler 11    vortioxetine (TRINTELLIX) 5 mg tablet Take  by mouth daily.  acetaminophen (TYLENOL EXTRA STRENGTH) 500 mg tablet Take  by mouth every six (6) hours as needed for Pain.  DULoxetine (CYMBALTA) 60 mg capsule Take 60 mg by mouth daily.  gabapentin (NEURONTIN) 300 mg capsule Take 300 mg by mouth three (3) times daily.  ARIPiprazole (ABILIFY) 10 mg tablet Take 10 mg by mouth daily.  ALPRAZolam (XANAX) 0.5 mg tablet Take 2 mg by mouth three (3) times daily as needed.  Indications: patient taking 2 mg as needed         Patient Active Problem List    Diagnosis Date Noted    Palpitations 10/27/2020    Nephrolithiasis 10/27/2020    GANT (dyspnea on exertion) 10/27/2020    Severe obesity ([facility] 08/07/2020    ISA (generalized anxiety disorder) 05/12/2020    PTSD (post-traumatic stress disorder) 05/12/2020    Panic attack 05/12/2020    Chronic fatigue 05/12/2020    Vitamin D deficiency 03/30/2019    Current moderate episode of major depressive disorder ([facility] [facility] 12/06/2018    Hyperlipidemia 12/06/2018    Acquired hypothyroidism 12/06/2018    Fibromyalgia 12/06/2018    Obesity (BMI 30-39.9) 12/06/2018    Tobacco use 12/06/2018    Gastroesophageal reflux disease 12/06/2018    Lymphocytic colitis 12/06/2018     Results for orders placed or performed during the hospital encounter of 08/07/20   HEPATIC FUNCTION PANEL   Result Value Ref Range    Protein, total 6.6 6.4 - 8.2 g/dL    Albumin 3.7 3.4 - 5.0 g/dL    Globulin 2.9 2.0 - 4.0 g/dL    A-G Ratio 1.3 0.8 - 1.7      Bilirubin, total 1.1 (H) 0.2 - 1.0 MG/DL    Bilirubin, direct 0.1 0.0 - 0.2 MG/DL    Alk. phosphatase 121 (H) 45 - 117 U/L    AST (SGOT) 28 10 - 38 U/L    ALT (SGPT) 88 (H) 13 - 56 U/L     Hospital Outpatient Visit on 08/07/2020   Component Date Value Ref Range Status    Protein, total 08/07/2020 6.6  6.4 - 8.2 g/dL Final    Albumin 08/07/2020 3.7  3.4 - 5.0 g/dL Final    Globulin 08/07/2020 2.9  2.0 - 4.0 g/dL Final    A-G Ratio 08/07/2020 1.3  0.8 - 1.7   Final    Bilirubin, total 08/07/2020 1.1* 0.2 - 1.0 MG/DL Final    Bilirubin, direct 08/07/2020 0.1  0.0 - 0.2 MG/DL Final    Alk.  phosphatase 08/07/2020 121* 45 - 117 U/L Final    AST (SGOT) 08/07/2020 28  10 - 38 U/L Final    ALT (SGPT) 08/07/2020 88* 13 - 56 U/L Final

## 2020-10-27 NOTE — PROGRESS NOTES
Subjective:      He was in the office today for cardiac evaluation. She is a 10-year-old woman that seen in the [address] [place] [address] ED on 9/23/2028 with a chief complaint of swelling and inability to lie flat secondary to shortness of breath    The patient  reports no known cardiac history. She reports increasing shortness of breath with activity. She says that  sometimes walking 3 steps will cause her to be short of breath. She has an inability to lie flat secondary to shortness of breath. She has had no PND. She sleeps on 3 pillows for comfort and breathing. She has had no exertional chest pain. She does experience occasional chest discomfort \"once in a blue moon\". She  also experiences palpitations which she describes as her \"heart beating fast \". The episodes of palpitations last seconds at a time. There have been no associated symptoms. She has had ongoing swelling in her lower extremities. She reports that Lasix was started 1 to 2 months ago which seems to have helped. She has pain in her of both of her feet. Patient's cardiac risk factors are smoking/ tobacco exposure, dyslipidemia.         Patient Active Problem List    Diagnosis Date Noted    Palpitations 10/27/2020    Nephrolithiasis 10/27/2020    GANT (dyspnea on exertion) 10/27/2020    Severe obesity ([address] 08/07/2020    ISA (generalized anxiety disorder) 05/12/2020    PTSD (post-traumatic stress disorder) 05/12/2020    Panic attack 05/12/2020    Chronic fatigue 05/12/2020    Vitamin D deficiency 03/30/2019    Current moderate episode of major depressive disorder ([address] 12/06/2018    Hyperlipidemia 12/06/2018    Acquired hypothyroidism 12/06/2018    Fibromyalgia 12/06/2018    Obesity (BMI 30-39.9) 12/06/2018    Tobacco use 12/06/2018    Gastroesophageal reflux disease 12/06/2018    Lymphocytic colitis 12/06/2018     Current Outpatient Medications   Medication Sig Dispense Refill    famotidine (PEPCID) 40 mg tablet Take 1 Tab by mouth daily for 90 days. 90 Tab 0    atorvastatin (LIPITOR) 40 mg tablet Take 1 tablet by mouth once daily 90 Tab 0    furosemide (LASIX) 40 mg tablet Take 1 Tab by mouth daily. 30 Tab 1    ergocalciferol (Vitamin D2) 1,250 mcg (50,000 unit) capsule Take 1 Cap by mouth every seven (7) days for 90 days. 12 Cap 0    amitriptyline (ELAVIL) 100 mg tablet Take 200 mg by mouth nightly.  levothyroxine (SYNTHROID) 125 mcg tablet Take 125 mcg by mouth daily.  albuterol (PROVENTIL HFA, VENTOLIN HFA, PROAIR HFA) 90 mcg/actuation inhaler Albuterol inhaler, 2 puffs up to every 4 hours if needed for wheezing, cough, shortness of breath 1 Inhaler 11    fluticasone propionate (FLOVENT HFA) 110 mcg/actuation inhaler Take 2 Puffs by inhalation every twelve (12) hours. 1 Inhaler 11    vortioxetine (TRINTELLIX) 5 mg tablet Take  by mouth daily.  acetaminophen (TYLENOL EXTRA STRENGTH) 500 mg tablet Take  by mouth every six (6) hours as needed for Pain.  DULoxetine (CYMBALTA) 60 mg capsule Take 60 mg by mouth daily.  gabapentin (NEURONTIN) 300 mg capsule Take 300 mg by mouth three (3) times daily.  ARIPiprazole (ABILIFY) 10 mg tablet Take 10 mg by mouth daily.  ALPRAZolam (XANAX) 0.5 mg tablet Take 2 mg by mouth three (3) times daily as needed. Indications: patient taking 2 mg as needed       Allergies   Allergen Reactions    Bactrim [Sulfamethoprim] Unknown (comments)    Bactrim [Sulfamethoprim] Other (comments)     Thrush     Past Medical History:   Diagnosis Date    Breast cyst     Depression     Emphysema lung ([address]     1 year and a half it was mentioned by ER Doc.     Fibromyalgia     Hyperlipidemia     Hypothyroidism     Ovarian cyst      Past Surgical History:   Procedure Laterality Date    BREAST SURGERY PROCEDURE UNLISTED      HX APPENDECTOMY      HX GYN      Cyst, Uterine Ablation    HX TUBAL LIGATION      IMPLANT BREAST SILICONE/EQ Bilateral Approximately 1999     Family History   Problem Relation Age of Onset    Heart Disease Mother     Heart Disease Father     Heart Disease Sister     MS Sister     Heart Disease Brother         3 heart attacks    Cancer Paternal Aunt         breast    Breast Cancer Maternal Aunt     Diabetes Neg Hx     Hypertension Neg Hx      Social History     Tobacco Use   Smoking Status Former Smoker    Packs/day: 0.50   Smokeless Tobacco Current User   Tobacco Comment    patient stated she quit three weeks ago cig and now uses vape          Review of Systems, additional:  Constitutional: negative  Eyes: negative  Respiratory: positive for dyspnea on exertion  Cardiovascular: positive for palpitations, orthopnea, lower extremity edema, Pain  Gastrointestinal: negative  Musculoskeletal:positive for myalgias  Neurological: negative  Behvioral/Psych: negative  Endocrine: negative  ENT: negative    Objective:     Visit Vitals  BP (!) 81/61 (BP 1 Location: Right arm, BP Patient Position: Sitting)   Pulse 93   Temp 98.3 °F (36.8 °C) (Temporal)   Resp 16   Ht 5' 5\" (1.651 m)   Wt 230 lb 9.6 oz (104.6 kg)   SpO2 93%   BMI 38.37 kg/m²     General:  alert, cooperative, no distress   Chest Wall: inspection normal - no chest wall deformities or tenderness, respiratory effort normal   Lung: clear to auscultation bilaterally   Heart:  normal rate and regular rhythm, no murmurs noted, no gallops noted, no JVD   Abdomen: soft, non-tender. Bowel sounds normal. No masses,  no organomegaly   Extremities: extremities normal, atraumatic, no cyanosis or edema Skin: no rashes   Neuro: alert, oriented, normal speech, no focal findings or movement disorder noted     EKG:/23/20. Sinus rhythm. Diffuse nondiagnostic ST and T wave abnormalities. Assessment/Plan:       ICD-10-CM ICD-9-CM    1. Palpitations , will 24-hour Holter monitor. R00.2 785.1 CARDIAC HOLTER MONITOR      ECHO ADULT COMPLETE   2.  Edema, unspecified type , will order echocardiogram and have patient return in 3 weeks. R60.9 782.3 CARDIAC HOLTER MONITOR      ECHO ADULT COMPLETE   3. Hyperlipidemia, unspecified hyperlipidemia type , last LDL 85.8 with triglycerides of 336 and HDL 41 on 5/14/2020. E78.5 272.4    4. Tobacco use  Z72.0 305.1    5. Panic attack , history of F41.0 300.01    6. Fibromyalgia  M79.7 729.1    7.  Gastroesophageal reflux disease, unspecified whether esophagitis present  K21.9 530.81

## 2020-10-28 ENCOUNTER — HOSPITAL ENCOUNTER (OUTPATIENT)
Dept: NON INVASIVE DIAGNOSTICS | Age: 50
Discharge: HOME | End: 2020-10-28
Attending: INTERNAL MEDICINE
Payer: MEDICAID

## 2020-10-28 VITALS
SYSTOLIC BLOOD PRESSURE: 104 MMHG | HEIGHT: 65 IN | WEIGHT: 231 LBS | BODY MASS INDEX: 38.49 KG/M2 | DIASTOLIC BLOOD PRESSURE: 67 MMHG

## 2020-10-28 DIAGNOSIS — R00.2 PALPITATIONS: ICD-10-CM

## 2020-10-28 DIAGNOSIS — R60.9 EDEMA, UNSPECIFIED TYPE: ICD-10-CM

## 2020-10-28 LAB
ECHO AO ARCH DIAM: 2.88 CM
ECHO AO ASC DIAM: 3.17 CM
ECHO AO ROOT DIAM: 3.32 CM
ECHO AO ST JNCT DIAM: 3.17 CM
ECHO IVC PROX: 1.96 CM
ECHO IVC SNIFF: 1.96 CM
ECHO LA AREA 2C: 21.1 CM2
ECHO LA AREA 4C: 17.3 CM2
ECHO LA MAJOR AXIS: 3.16 CM
ECHO LA MINOR AXIS: 1.5 CM
ECHO LA TO AORTIC ROOT RATIO: 0.95
ECHO LA VOL 2C: 67.52 ML (ref 22–52)
ECHO LA VOL 4C: 45.39 ML (ref 22–52)
ECHO LA VOL BP: 58.57 ML (ref 22–52)
ECHO LA VOL/BSA BIPLANE: 27.85 ML/M2 (ref 16–28)
ECHO LA VOLUME INDEX A2C: 32.11 ML/M2 (ref 16–28)
ECHO LA VOLUME INDEX A4C: 21.58 ML/M2 (ref 16–28)
ECHO LV E' LATERAL VELOCITY: 9.36 CM/S
ECHO LV E' SEPTAL VELOCITY: 9.47 CM/S
ECHO LV EDV A2C: 97.3 ML
ECHO LV EDV A4C: 93 ML
ECHO LV EDV BP: 95.7 ML (ref 56–104)
ECHO LV EDV INDEX A4C: 44.2 ML/M2
ECHO LV EDV INDEX BP: 45.5 ML/M2
ECHO LV EDV NDEX A2C: 46.3 ML/M2
ECHO LV EDV TEICHHOLZ: 0.73 ML
ECHO LV EJECTION FRACTION A2C: 53 %
ECHO LV EJECTION FRACTION A4C: 58 %
ECHO LV EJECTION FRACTION BIPLANE: 55.3 % (ref 55–100)
ECHO LV ESV A2C: 45.4 ML
ECHO LV ESV A4C: 39.2 ML
ECHO LV ESV BP: 42.8 ML (ref 19–49)
ECHO LV ESV INDEX A2C: 21.6 ML/M2
ECHO LV ESV INDEX A4C: 18.6 ML/M2
ECHO LV ESV INDEX BP: 20.4 ML/M2
ECHO LV ESV TEICHHOLZ: 0.28 ML
ECHO LV INTERNAL DIMENSION DIASTOLIC: 5.05 CM (ref 3.9–5.3)
ECHO LV INTERNAL DIMENSION SYSTOLIC: 3.35 CM
ECHO LV IVSD: 0.92 CM (ref 0.6–0.9)
ECHO LV MASS 2D: 140.3 G (ref 67–162)
ECHO LV MASS INDEX 2D: 66.7 G/M2 (ref 43–95)
ECHO LV POSTERIOR WALL DIASTOLIC: 0.7 CM (ref 0.6–0.9)
ECHO LVOT DIAM: 2.14 CM
ECHO LVOT PEAK GRADIENT: 2.7 MMHG
ECHO LVOT SV: 65.4 ML
ECHO LVOT VTI: 18.24 CM
ECHO MV A VELOCITY: 60.98 CM/S
ECHO MV E DECELERATION TIME (DT): 135.9 MS
ECHO MV E VELOCITY: 103.3 CM/S
ECHO MV E/A RATIO: 1.69
ECHO MV E/E' LATERAL: 11.04
ECHO MV E/E' RATIO (AVERAGED): 10.97
ECHO MV E/E' SEPTAL: 10.91
ECHO RA MINOR AXIS: 4.1 CM
ECHO RV TAPSE: 1.81 CM (ref 1.5–2)
LVFS 2D: 33.58 %
LVOT MG: 1.42 MMHG
LVOT MV: 0.56 CM/S
LVSV (MOD BI): 24.17 ML
LVSV (MOD SINGLE 4C): 24.56 ML
LVSV (MOD SINGLE): 23.67 ML
LVSV (TEICH): 34.26 ML
MV DEC SLOPE: 7.6

## 2020-10-28 PROCEDURE — 93226 XTRNL ECG REC<48 HR SCAN A/R: CPT

## 2020-10-28 PROCEDURE — 93306 TTE W/DOPPLER COMPLETE: CPT

## 2020-11-02 ENCOUNTER — TELEPHONE (OUTPATIENT)
Dept: FAMILY MEDICINE CLINIC | Age: 50
End: 2020-11-02

## 2020-11-10 ENCOUNTER — OFFICE VISIT (OUTPATIENT)
Dept: CARDIOLOGY CLINIC | Age: 50
End: 2020-11-10
Payer: MEDICAID

## 2020-11-10 VITALS
DIASTOLIC BLOOD PRESSURE: 76 MMHG | BODY MASS INDEX: 37.77 KG/M2 | WEIGHT: 227 LBS | TEMPERATURE: 98.1 F | HEART RATE: 87 BPM | SYSTOLIC BLOOD PRESSURE: 122 MMHG

## 2020-11-10 DIAGNOSIS — R00.2 PALPITATIONS: Primary | ICD-10-CM

## 2020-11-10 PROCEDURE — 99214 OFFICE O/P EST MOD 30 MIN: CPT | Performed by: INTERNAL MEDICINE

## 2020-11-13 DIAGNOSIS — E78.5 HYPERLIPIDEMIA, UNSPECIFIED HYPERLIPIDEMIA TYPE: ICD-10-CM

## 2020-11-13 DIAGNOSIS — R93.1 ABNORMAL ECHOCARDIOGRAM: ICD-10-CM

## 2020-11-13 DIAGNOSIS — R60.1 GENERALIZED EDEMA: ICD-10-CM

## 2020-11-13 DIAGNOSIS — K21.9 GASTROESOPHAGEAL REFLUX DISEASE WITHOUT ESOPHAGITIS: ICD-10-CM

## 2020-11-13 RX ORDER — ATORVASTATIN CALCIUM 40 MG/1
TABLET, FILM COATED ORAL
Qty: 90 TAB | Refills: 0 | Status: SHIPPED | OUTPATIENT
Start: 2020-11-13 | End: 2021-03-16

## 2020-11-13 RX ORDER — FAMOTIDINE 40 MG/1
TABLET, FILM COATED ORAL
Qty: 90 TAB | Refills: 0 | Status: SHIPPED | OUTPATIENT
Start: 2020-11-13 | End: 2021-04-18

## 2020-11-13 RX ORDER — FUROSEMIDE 40 MG/1
TABLET ORAL
Qty: 30 TAB | Refills: 0 | Status: SHIPPED | OUTPATIENT
Start: 2020-11-13 | End: 2020-12-16

## 2020-11-14 NOTE — PROGRESS NOTES
Subjective:      He was in the office today for cardiac evaluation. She is a 53-year-old woman that seen in the [address] [place] [address] ED on 9/23/2028 with a chief complaint of swelling and inability to lie flat secondary to shortness of breath    The patient  reports no known cardiac history. She reports increasing shortness of breath with activity. She says that  sometimes walking 3 steps will cause her to be short of breath. She has an inability to lie flat secondary to shortness of breath. She has had no PND. She sleeps on 3 pillows for comfort and breathing. She has had no exertional chest pain. She does experience occasional chest discomfort \"once in a blue moon\". She  also experiences palpitations which she describes as her \"heart beating fast \". The episodes of palpitations last seconds at a time. There have been no associated symptoms. She has had ongoing swelling in her lower extremities. She reports that Lasix was started 1 to 2 months ago which seems to have helped. She has pain in her of both of her feet. At her initial appointment, a echo and Holter were ordered. Results are as listed below. She reports no symptoms while wearing the Holter. She maintains that her legs and feet are still swelling. She will be seeing someone from endocrinology soon. We will see her in return in 6 months. Patient's cardiac risk factors are smoking/ tobacco exposure, dyslipidemia.         Patient Active Problem List    Diagnosis Date Noted    Palpitations 10/27/2020    Nephrolithiasis 10/27/2020    GANT (dyspnea on exertion) 10/27/2020    Severe obesity ([address] 08/07/2020    ISA (generalized anxiety disorder) 05/12/2020    PTSD (post-traumatic stress disorder) 05/12/2020    Panic attack 05/12/2020    Chronic fatigue 05/12/2020    Vitamin D deficiency 03/30/2019    Current moderate episode of major depressive disorder ([address] 12/06/2018    Hyperlipidemia 12/06/2018    Acquired hypothyroidism 12/06/2018    Fibromyalgia 12/06/2018    Obesity (BMI 30-39.9) 12/06/2018    Tobacco use 12/06/2018    Gastroesophageal reflux disease 12/06/2018    Lymphocytic colitis 12/06/2018     Current Outpatient Medications   Medication Sig Dispense Refill    nicotine (NICODERM CQ) 21 mg/24 hr 1 Patch by TransDERmal route every twenty-four (24) hours for 30 days. 30 Patch 0    ergocalciferol (Vitamin D2) 1,250 mcg (50,000 unit) capsule Take 1 Cap by mouth every seven (7) days for 90 days. 12 Cap 0    amitriptyline (ELAVIL) 100 mg tablet Take 200 mg by mouth nightly.  levothyroxine (SYNTHROID) 125 mcg tablet Take 125 mcg by mouth daily.  albuterol (PROVENTIL HFA, VENTOLIN HFA, PROAIR HFA) 90 mcg/actuation inhaler Albuterol inhaler, 2 puffs up to every 4 hours if needed for wheezing, cough, shortness of breath 1 Inhaler 11    fluticasone propionate (FLOVENT HFA) 110 mcg/actuation inhaler Take 2 Puffs by inhalation every twelve (12) hours. 1 Inhaler 11    vortioxetine (TRINTELLIX) 5 mg tablet Take  by mouth daily.  acetaminophen (TYLENOL EXTRA STRENGTH) 500 mg tablet Take  by mouth every six (6) hours as needed for Pain.  DULoxetine (CYMBALTA) 60 mg capsule Take 60 mg by mouth daily.  gabapentin (NEURONTIN) 300 mg capsule Take 300 mg by mouth three (3) times daily.  ARIPiprazole (ABILIFY) 10 mg tablet Take 10 mg by mouth daily.  ALPRAZolam (XANAX) 0.5 mg tablet Take 2 mg by mouth three (3) times daily as needed.  Indications: patient taking 2 mg as needed      atorvastatin (LIPITOR) 40 mg tablet Take 1 tablet by mouth once daily 90 Tab 0    furosemide (LASIX) 40 mg tablet Take 1 tablet by mouth once daily 30 Tab 0    famotidine (PEPCID) 40 mg tablet Take 1 tablet by mouth once daily for 90 days 90 Tab 0     Allergies   Allergen Reactions    Bactrim [Sulfamethoprim] Unknown (comments)    Bactrim [Sulfamethoprim] Other (comments)     Thrush     Past Medical History: Diagnosis Date    Breast cyst     Depression     Emphysema lung ([facility]     1 year and a half it was mentioned by ER Doc.  Fibromyalgia     Hyperlipidemia     Hypothyroidism     Ovarian cyst      Past Surgical History:   Procedure Laterality Date    BREAST SURGERY PROCEDURE UNLISTED      HX APPENDECTOMY      HX GYN      Cyst, Uterine Ablation    HX TUBAL LIGATION      IMPLANT BREAST SILICONE/EQ Bilateral     Approximately 1999     Family History   Problem Relation Age of Onset    Heart Disease Mother     Heart Disease Father     Heart Disease Sister     MS Sister     Heart Disease Brother         3 heart attacks    Cancer Paternal Aunt         breast    Breast Cancer Maternal Aunt     Diabetes Neg Hx     Hypertension Neg Hx      Social History     Tobacco Use   Smoking Status Former Smoker    Packs/day: 0.50   Smokeless Tobacco Current User   Tobacco Comment    patient stated she quit three weeks ago cig and now uses vape          Review of Systems, additional:  Constitutional: negative  Eyes: negative  Respiratory: positive for dyspnea on exertion  Cardiovascular: positive for palpitations, orthopnea, lower extremity edema, Pain  Gastrointestinal: negative  Musculoskeletal:positive for myalgias  Neurological: negative  Behvioral/Psych: negative  Endocrine: negative  ENT: negative    Objective:     Visit Vitals  /76   Pulse 87   Temp 98.1 °F (36.7 °C) (Temporal)   Wt 227 lb (103 kg)   BMI 37.77 kg/m²     General:  alert, cooperative, no distress   Chest Wall: inspection normal - no chest wall deformities or tenderness, respiratory effort normal   Lung: clear to auscultation bilaterally   Heart:  normal rate and regular rhythm, no murmurs noted, no gallops noted, no JVD   Abdomen: soft, non-tender.  Bowel sounds normal. No masses,  no organomegaly   Extremities: extremities normal, atraumatic, no cyanosis or edema Skin: no rashes   Neuro: alert, oriented, normal speech, no focal findings or movement disorder noted     EKG:/23/20. Sinus rhythm. Diffuse nondiagnostic ST and T wave abnormalities. Assessment/Plan:       ICD-10-CM ICD-9-CM    1. Palpitations , will 24-hour Holter monitor. R00.2 785.1 CARDIAC HOLTER MONITOR      ECHO ADULT COMPLETE   2. Edema, unspecified type , ordered echocardiogram which was completed on 10/28/2020. Ejection fraction 55 to 60%. Normal diastolic function. No significant valvular pathology. 48-hour Holter monitor done on 10/28/2020. No ectopy. Sinus rhythm with average heart rate of 83 bpm.  No further evaluation planned at this time. Will see patient in return in 6 months. R60.9 782.3 CARDIAC HOLTER MONITOR      ECHO ADULT COMPLETE   3. Hyperlipidemia, unspecified hyperlipidemia type , last LDL 85.8 with triglycerides of 336 and HDL 41 on 5/14/2020. E78.5 272.4    4. Tobacco use  Z72.0 305.1    5. Panic attack , history of F41.0 300.01    6. Fibromyalgia  M79.7 729.1    7.  Gastroesophageal reflux disease, unspecified whether esophagitis present  K21.9 530.81

## 2020-12-15 DIAGNOSIS — R60.1 GENERALIZED EDEMA: ICD-10-CM

## 2020-12-15 DIAGNOSIS — R93.1 ABNORMAL ECHOCARDIOGRAM: ICD-10-CM

## 2020-12-16 RX ORDER — FUROSEMIDE 40 MG/1
TABLET ORAL
Qty: 30 TAB | Refills: 0 | Status: SHIPPED | OUTPATIENT
Start: 2020-12-16 | End: 2021-03-02

## 2021-03-02 ENCOUNTER — OFFICE VISIT (OUTPATIENT)
Dept: FAMILY MEDICINE CLINIC | Age: 51
End: 2021-03-02
Payer: MEDICAID

## 2021-03-02 VITALS
DIASTOLIC BLOOD PRESSURE: 73 MMHG | HEART RATE: 83 BPM | OXYGEN SATURATION: 97 % | BODY MASS INDEX: 37.49 KG/M2 | SYSTOLIC BLOOD PRESSURE: 102 MMHG | WEIGHT: 225 LBS | RESPIRATION RATE: 16 BRPM | HEIGHT: 65 IN | TEMPERATURE: 98.4 F

## 2021-03-02 DIAGNOSIS — E55.9 VITAMIN D DEFICIENCY: ICD-10-CM

## 2021-03-02 DIAGNOSIS — M79.7 FIBROMYALGIA: ICD-10-CM

## 2021-03-02 DIAGNOSIS — E66.01 SEVERE OBESITY (HCC): Primary | ICD-10-CM

## 2021-03-02 DIAGNOSIS — F33.1 MODERATE EPISODE OF RECURRENT MAJOR DEPRESSIVE DISORDER (HCC): ICD-10-CM

## 2021-03-02 DIAGNOSIS — R41.3 MEMORY LOSS: ICD-10-CM

## 2021-03-02 DIAGNOSIS — E03.9 ACQUIRED HYPOTHYROIDISM: ICD-10-CM

## 2021-03-02 PROCEDURE — 99214 OFFICE O/P EST MOD 30 MIN: CPT | Performed by: LEGAL MEDICINE

## 2021-03-02 RX ORDER — ALBUTEROL SULFATE 0.63 MG/3ML
SOLUTION RESPIRATORY (INHALATION) AS NEEDED
COMMUNITY
End: 2021-06-14

## 2021-03-02 RX ORDER — ZOLPIDEM TARTRATE 12.5 MG/1
10 TABLET, FILM COATED, EXTENDED RELEASE ORAL
COMMUNITY

## 2021-03-02 NOTE — PROGRESS NOTES
Ana Riojas is a 44 y.o. female (: 1970) presenting to address:    Chief Complaint   Patient presents with    Memory Loss     Follow up       Vitals:    21 1515   BP: 102/73   Pulse: 83   Resp: 16   Temp: 98.4 °F (36.9 °C)   TempSrc: Temporal   SpO2: 97%   Weight: 225 lb (102.1 kg)   Height: 5' 5\" (1.651 m)       Hearing/Vision:   No exam data present    Learning Assessment:     Learning Assessment 2018   PRIMARY LEARNER Patient   HIGHEST LEVEL OF EDUCATION - PRIMARY LEARNER  DID NOT GRADUATE HIGH SCHOOL   BARRIERS PRIMARY LEARNER NONE   CO-LEARNER CAREGIVER No   PRIMARY LANGUAGE ENGLISH   LEARNER PREFERENCE PRIMARY LISTENING   ANSWERED BY patient   RELATIONSHIP SELF     Depression Screening:     3 most recent PHQ Screens 3/2/2021   Little interest or pleasure in doing things Not at all   Feeling down, depressed, irritable, or hopeless Not at all   Total Score PHQ 2 0     Fall Risk Assessment:     Fall Risk Assessment, last 12 mths 3/2/2021   Able to walk? Yes   Fall in past 12 months? 0   Do you feel unsteady? 0   Are you worried about falling 0     Abuse Screening:     Abuse Screening Questionnaire 3/2/2021   Do you ever feel afraid of your partner? N   Are you in a relationship with someone who physically or mentally threatens you? N   Is it safe for you to go home? Y     Coordination of Care Questionaire:   1. Have you been to the ER, urgent care clinic since your last visit? Hospitalized since your last visit? NO    2. Have you seen or consulted any other health care providers outside of the [address] [place] [address] since your last visit? Include any pap smears or colon screening. NO    Advanced Directive:   1. Do you have an Advanced Directive? NO    2. Would you like information on Advanced Directives?  NO

## 2021-03-02 NOTE — PROGRESS NOTES
Oleksandrrichie Stefania     Chief Complaint   Patient presents with    Memory Loss     Follow up     Vitals:    03/02/21 1515   BP: 102/73   Pulse: 83   Resp: 16   Temp: 98.4 °F (36.9 °C)   TempSrc: Temporal   SpO2: 97%   Weight: 225 lb (102.1 kg)   Height: 5' 5\" (1.651 m)         HPI:  Has been having short term memory , she does not remember what happened the night before  It has been going on for 6 month ,got lost driving , patient can get confused about dates and timing as long as she forgot what happened the night before most of the time, she keeps repeating herself with the same subject    Patient has no headache no fever no chills no  she feels depressed because of her memory issues    She is accompanied with her  today who gave part of the history about her memory loss and forgetfulness    Past Medical History:   Diagnosis Date    Breast cyst     Depression     Emphysema lung ([address]     1 year and a half it was mentioned by ER Doc.     Fibromyalgia     Hyperlipidemia     Hypothyroidism     Ovarian cyst      Past Surgical History:   Procedure Laterality Date    HX APPENDECTOMY      HX GYN      Cyst, Uterine Ablation    HX TUBAL LIGATION      IMPLANT BREAST SILICONE/EQ Bilateral     Approximately 1999    [place] BREAST SURGERY PROCEDURE UNLISTED       Social History     Tobacco Use    Smoking status: Former Smoker     Packs/day: 0.50    Smokeless tobacco: Current User    Tobacco comment: patient stated she quit three weeks ago cig and now uses vape   Substance Use Topics    Alcohol use: Yes     Comment: occ       Family History   Problem Relation Age of Onset    Heart Disease Mother     Heart Disease Father     Heart Disease Sister     MS Sister     Heart Disease Brother         3 heart attacks    Cancer Paternal Aunt         breast    Breast Cancer Maternal Aunt     Diabetes Neg Hx     Hypertension Neg Hx        Review of Systems   Constitutional: Negative for chills, fever, malaise/fatigue and weight loss.   HENT: Negative for congestion, ear discharge, ear pain, hearing loss, nosebleeds, sinus pain and sore throat.    Eyes: Positive for blurred vision. Negative for double vision, discharge and redness.   Respiratory: Negative for cough, hemoptysis, sputum production, shortness of breath and wheezing.    Cardiovascular: Negative for chest pain, palpitations, claudication and leg swelling.   Gastrointestinal: Negative for abdominal pain, constipation, diarrhea, nausea and vomiting.   Genitourinary: Negative for dysuria, frequency and urgency.   Musculoskeletal: Negative for myalgias.   Skin: Negative for itching and rash.   Neurological: Positive for dizziness. Negative for tingling, sensory change, speech change, focal weakness, weakness and headaches.   Psychiatric/Behavioral: Positive for depression and memory loss. Negative for hallucinations, substance abuse and suicidal ideas. The patient is not nervous/anxious and does not have insomnia.        Physical Exam  Vitals signs and nursing note reviewed.   Constitutional:       General: She is not in acute distress.     Appearance: She is well-developed. She is not diaphoretic.   HENT:      Head: Normocephalic and atraumatic.   Eyes:      General: No scleral icterus.        Right eye: No discharge.         Left eye: No discharge.      Conjunctiva/sclera: Conjunctivae normal.      Pupils: Pupils are equal, round, and reactive to light.   Neck:      Thyroid: No thyromegaly.   Cardiovascular:      Rate and Rhythm: Normal rate and regular rhythm.      Heart sounds: Normal heart sounds.   Pulmonary:      Effort: Pulmonary effort is normal. No respiratory distress.      Breath sounds: Normal breath sounds. No wheezing or rales.   Chest:      Chest wall: No tenderness.   Abdominal:      General: There is no distension.      Palpations: Abdomen is soft.      Tenderness: There is no abdominal tenderness. There is no rebound.   Musculoskeletal: Normal range of  motion. General: No tenderness or deformity. Lymphadenopathy:      Cervical: No cervical adenopathy. Skin:     General: Skin is warm and dry. Coloration: Skin is not pale. Findings: No erythema or rash. Neurological:      Mental Status: She is alert and oriented to person, place, and time. Cranial Nerves: No cranial nerve deficit. Coordination: Coordination normal.   Psychiatric:         Behavior: Behavior normal.         Thought Content: Thought content normal.         Judgment: Judgment normal.          Assessment and plan     Plan of care has been discussed with the patient, he agrees to the plan and verbalized understanding. All his questions were answered  More than 50% of the time spent in this visit was counseling the patient about  illness and treatment options         1. Severe obesity ([address]  Patient actually has lost few pounds since last year advised to continue lifestyle modifications    2. Moderate episode of recurrent major depressive disorder (HCC)  Currently stable not suicidal not homicidal    3. Acquired hypothyroidism  Stable on current medications    4. Fibromyalgia  Stable    5. Vitamin D deficiency      6. Memory loss    - VITAMIN B12; Future  - CBC WITH AUTOMATED DIFF; Future  - CT HEAD W WO CONT; Future  - REFERRAL TO NEUROLOGY    Current Outpatient Medications   Medication Sig Dispense Refill    zolpidem CR (AMBIEN CR) 12.5 mg tablet Take 12.5 mg by mouth nightly.  albuterol (ACCUNEB) 0.63 mg/3 mL nebulizer solution Take  by inhalation as needed.  atorvastatin (LIPITOR) 40 mg tablet Take 1 tablet by mouth once daily 90 Tab 0    famotidine (PEPCID) 40 mg tablet Take 1 tablet by mouth once daily for 90 days 90 Tab 0    amitriptyline (ELAVIL) 100 mg tablet Take 200 mg by mouth nightly.  levothyroxine (SYNTHROID) 125 mcg tablet Take 125 mcg by mouth daily.       albuterol (PROVENTIL HFA, VENTOLIN HFA, PROAIR HFA) 90 mcg/actuation inhaler Albuterol inhaler, 2 puffs up to every 4 hours if needed for wheezing, cough, shortness of breath 1 Inhaler 11    fluticasone propionate (FLOVENT HFA) 110 mcg/actuation inhaler Take 2 Puffs by inhalation every twelve (12) hours. 1 Inhaler 11    vortioxetine (TRINTELLIX) 5 mg tablet Take  by mouth daily.  DULoxetine (CYMBALTA) 60 mg capsule Take 60 mg by mouth daily.  ARIPiprazole (ABILIFY) 10 mg tablet Take 10 mg by mouth daily.  ALPRAZolam (XANAX) 0.5 mg tablet Take 2 mg by mouth three (3) times daily as needed. Indications: patient taking 2 mg as needed      ergocalciferol (Vitamin D2) 1,250 mcg (50,000 unit) capsule Take 1 Cap by mouth every seven (7) days for 90 days. 12 Cap 0    acetaminophen (TYLENOL EXTRA STRENGTH) 500 mg tablet Take  by mouth every six (6) hours as needed for Pain.          Patient Active Problem List    Diagnosis Date Noted    Palpitations 10/27/2020    Nephrolithiasis 10/27/2020    GANT (dyspnea on exertion) 10/27/2020    Severe obesity ([address] 08/07/2020    ISA (generalized anxiety disorder) 05/12/2020    PTSD (post-traumatic stress disorder) 05/12/2020    Panic attack 05/12/2020    Chronic fatigue 05/12/2020    Vitamin D deficiency 03/30/2019    Current moderate episode of major depressive disorder ([facility] 12/06/2018    Hyperlipidemia 12/06/2018    Acquired hypothyroidism 12/06/2018    Fibromyalgia 12/06/2018    Obesity (BMI 30-39.9) 12/06/2018    Tobacco use 12/06/2018    Gastroesophageal reflux disease 12/06/2018    Lymphocytic colitis 12/06/2018     Results for orders placed or performed during the hospital encounter of 10/28/20   ECHO ADULT COMPLETE   Result Value Ref Range    IVSd 0.92 (A) 0.6 - 0.9 cm    LVIDd 5.05 3.9 - 5.3 cm    LVIDs 3.35 cm    LVOT d 2.14 cm    LVPWd 0.70 0.6 - 0.9 cm    BP EF 55.3 55 - 100 %    LV Ejection Fraction MOD 2C 53 %    LV Ejection Fraction MOD 4C 58 %    LV ED Vol A2C 97.3 mL    LV ED Vol A4C 93.0 mL    LV ED Vol BP 95.7 56 - 104 mL    LV ES Vol A2C 45.4 mL    LV ES Vol A4C 39.2 mL    LV ES Vol BP 42.8 19 - 49 mL    LVOT Peak Gradient 2.70 mmHg    Left Ventricular Outflow Tract Mean Gradient 1.91880894058385 mmHg    LVOT SV 65.4 ml    LVOT VTI 18.24 cm    Left Atrium Major Axis 3.16 cm    LA Volume 58.57 22 - 52 mL    LA Area 4C 17.3 cm2    LA Vol 2C 67.52 (A) 22 - 52 mL    LA Vol 4C 45.39 22 - 52 mL    MV A Compa 60.98 cm/s    Mitral Valve E Wave Deceleration Time 135.9 ms    MV E Compa 103.30 cm/s    E/E' lateral 11.04     E/E' septal 10.91     LV E' Lateral Velocity 9.36 cm/s    LV E' Septal Velocity 9.47 cm/s    Tapse 1.81 1.5 - 2.0 cm    AO ARCH D 2.88 cm    Aortic Sinotubular Junction Size 3.17 cm    Ao Root D 3.32 cm    IVC proximal 1.96 cm    MV E/A 1.69     LV Mass .3 67 - 162 g    LV Mass AL Index 66.7 43 - 95 g/m2    E/E' ratio (averaged) 10.97     LVES Vol Index BP 20.4 mL/m2    LVED Vol Index BP 45.5 mL/m2    Left Atrium Minor Axis 1.50 cm    LA Vol Index 27.85 16 - 28 ml/m2    LA Vol Index 32.11 16 - 28 ml/m2    LA Vol Index 21.58 16 - 28 ml/m2    LVED Vol Index A4C 44.2 mL/m2    LVED Vol Index A2C 46.3 mL/m2    LVES Vol Index A4C 18.6 mL/m2    LVES Vol Index A2C 21.6 mL/m2    AO ASC D 3.17 cm    Left Atrium to Aortic Root Ratio 0.95     Inferior Vena Cava Diameter Sniffing 1.96 cm    LA Area 2C 21.10 cm2    Right Atrium Minor Axis 4.10 cm    Left Ventricular Fractional Shortening by 2D 46.660684020 %    Left Ventricular Outflow Tract Mean Velocity 2.9110573189 cm/s    Mitral Valve Deceleration [place] 0.417672782     Left Ventricular End Diastolic Volume by Teichholz Method 4.1032735532 mL    Left Ventricular End Systolic Volume by Teichholz Method 4.78288707549 mL    Left Ventricular Stroke Volume by 2-D Biplane-MOD 94.460371923 mL    Left Ventricular Stroke Volume by 2-D Single Plane- MOD 69.097923825 mL    Left Ventricular Stroke Volume by Teichholz Method 80.561346051 mL    Left Ventricular Stroke Volume by 2-D Single Plane- MOD [id number] mL     No visits with results within 3 Month(s) from this visit.    Latest known visit with results is:   Hospital Outpatient Visit on 10/28/2020   Component Date Value Ref Range Status    IVSd 10/28/2020 0.92* 0.6 - 0.9 cm Final    LVIDd 10/28/2020 5.05  3.9 - 5.3 cm Final    LVIDs 10/28/2020 3.35  cm Final    LVOT d 10/28/2020 2.14  cm Final    LVPWd 10/28/2020 0.70  0.6 - 0.9 cm Final    BP EF 10/28/2020 55.3  55 - 100 % Final    LV Ejection Fraction MOD 2C 10/28/2020 53  % Final    LV Ejection Fraction MOD 4C 10/28/2020 58  % Final    LV ED Vol A2C 10/28/2020 97.3  mL Final    LV ED Vol A4C 10/28/2020 93.0  mL Final    LV ED Vol BP 10/28/2020 95.7  56 - 104 mL Final    LV ES Vol A2C 10/28/2020 45.4  mL Final    LV ES Vol A4C 10/28/2020 39.2  mL Final    LV ES Vol BP 10/28/2020 42.8  19 - 49 mL Final    LVOT Peak Gradient 10/28/2020 2.70  mmHg Final    Left Ventricular Outflow Tract Diana* 10/28/2020 1.53513697562480  mmHg Final    LVOT SV 10/28/2020 65.4  ml Final    LVOT VTI 10/28/2020 18.24  cm Final    Left Atrium Major Axis 10/28/2020 3.16  cm Final    LA Volume 10/28/2020 58.57  22 - 52 mL Final    LA Area 4C 10/28/2020 17.3  cm2 Final    LA Vol 2C 10/28/2020 67.52* 22 - 52 mL Final    LA Vol 4C 10/28/2020 45.39  22 - 52 mL Final    MV A Compa 10/28/2020 60.98  cm/s Final    Mitral Valve E Wave Deceleration T* 10/28/2020 135.9  ms Final    MV E Compa 10/28/2020 103.30  cm/s Final    E/E' lateral 10/28/2020 11.04   Final    E/E' septal 10/28/2020 10.91   Final    LV E' Lateral Velocity 10/28/2020 9.36  cm/s Final    LV E' Septal Velocity 10/28/2020 9.47  cm/s Final    Tapse 10/28/2020 1.81  1.5 - 2.0 cm Final    AO ARCH D 10/28/2020 2.88  cm Final    Aortic Sinotubular Junction Size 10/28/2020 3.17  cm Final    Ao Root D 10/28/2020 3.32  cm Final    IVC proximal 10/28/2020 1.96  cm Final    MV E/A 10/28/2020 1.69   Final    LV Mass AL 10/28/2020 140.3  67 - 162 g Final    LV Mass AL Index 10/28/2020 66.7  43 - 95 g/m2 Final    E/E' ratio (averaged) 10/28/2020 10.97   Final    LVES Vol Index BP 10/28/2020 20.4  mL/m2 Final    LVED Vol Index BP 10/28/2020 45.5  mL/m2 Final    Left Atrium Minor Axis 10/28/2020 1.50  cm Final    LA Vol Index 10/28/2020 27.85  16 - 28 ml/m2 Final    LA Vol Index 10/28/2020 32.11  16 - 28 ml/m2 Final    LA Vol Index 10/28/2020 21.58  16 - 28 ml/m2 Final    LVED Vol Index A4C 10/28/2020 44.2  mL/m2 Final    LVED Vol Index A2C 10/28/2020 46.3  mL/m2 Final    LVES Vol Index A4C 10/28/2020 18.6  mL/m2 Final    LVES Vol Index A2C 10/28/2020 21.6  mL/m2 Final    AO ASC D 10/28/2020 3.17  cm Final    Left Atrium to Aortic Root Ratio 10/28/2020 0.95   Final    Inferior Vena Cava Diameter Sniffi* 10/28/2020 1.96  cm Final    LA Area 2C 10/28/2020 21.10  cm2 Final    Right Atrium Minor Axis 10/28/2020 4.10  cm Final    Left Ventricular Fractional Shorte* 10/28/2020 98.534982086  % Final    Left Ventricular Outflow Tract Diana* 10/28/2020 6.9076228623  cm/s Final    Mitral Valve Deceleration [place] 10/28/2020 5.116371179   Final    Left Ventricular End Diastolic Vol* 74/86/1172 6.9839325534  mL Final    Left Ventricular End Systolic Volu* 94/45/6573 2.89561290568  mL Final    Left Ventricular Stroke Volume by * 10/28/2020 00.951834906  mL Final    Left Ventricular Stroke Volume by * 10/28/2020 68.325393722  mL Final    Left Ventricular Stroke Volume by * 10/28/2020 65.993896717  mL Final    Left Ventricular Stroke Volume by * 10/28/2020 55.311093124  mL Final          Follow-up and Dispositions    · Return in about 3 months (around 6/2/2021) for as per results, as needed.

## 2021-03-03 ENCOUNTER — APPOINTMENT (OUTPATIENT)
Dept: FAMILY MEDICINE CLINIC | Age: 51
End: 2021-03-03

## 2021-03-03 DIAGNOSIS — R41.3 MEMORY LOSS: ICD-10-CM

## 2021-03-12 ENCOUNTER — HOSPITAL ENCOUNTER (OUTPATIENT)
Dept: CT IMAGING | Age: 51
Discharge: HOME | End: 2021-03-12
Attending: LEGAL MEDICINE
Payer: MEDICAID

## 2021-03-12 DIAGNOSIS — R41.3 MEMORY LOSS: ICD-10-CM

## 2021-03-12 PROCEDURE — 70450 CT HEAD/BRAIN W/O DYE: CPT

## 2021-03-16 DIAGNOSIS — E78.5 HYPERLIPIDEMIA, UNSPECIFIED HYPERLIPIDEMIA TYPE: ICD-10-CM

## 2021-03-16 RX ORDER — ATORVASTATIN CALCIUM 40 MG/1
TABLET, FILM COATED ORAL
Qty: 90 TAB | Refills: 3 | Status: SHIPPED | OUTPATIENT
Start: 2021-03-16 | End: 2021-04-16 | Stop reason: SDUPTHER

## 2021-03-17 ENCOUNTER — TELEPHONE (OUTPATIENT)
Dept: FAMILY MEDICINE CLINIC | Age: 51
End: 2021-03-17

## 2021-03-17 NOTE — TELEPHONE ENCOUNTER
Received call from [facility] needing clarification on a CT of Head wo Contrast that is scheduled for tomorrow. Informed them that patient had that already done at [facility] on 3/12/21. They states patient needs to call and cancel so she will not receive a no show. Called patient and informed repeat number 3 times. She went on to ask about an MRI informed her we didn't order an MRI so she will need to contact the order provider for that.

## 2021-03-18 DIAGNOSIS — E78.5 HYPERLIPIDEMIA, UNSPECIFIED HYPERLIPIDEMIA TYPE: ICD-10-CM

## 2021-03-21 ENCOUNTER — TELEPHONE (OUTPATIENT)
Dept: FAMILY MEDICINE CLINIC | Age: 51
End: 2021-03-21

## 2021-03-22 NOTE — TELEPHONE ENCOUNTER
Results from [facility] normal vitamin B12 in the lowe range of normal to take vitamin B complex OTC     CBC is normal   Results to be scanned

## 2021-03-25 ENCOUNTER — TELEPHONE (OUTPATIENT)
Dept: FAMILY MEDICINE CLINIC | Age: 51
End: 2021-03-25

## 2021-03-25 NOTE — TELEPHONE ENCOUNTER
Received call from [facility] wanting to know if the referral is for neurology or neuropsych. Please advise.

## 2021-04-15 ENCOUNTER — OFFICE VISIT (OUTPATIENT)
Dept: NEUROLOGY | Age: 51
End: 2021-04-15
Payer: MEDICAID

## 2021-04-15 VITALS
HEIGHT: 65 IN | HEART RATE: 92 BPM | SYSTOLIC BLOOD PRESSURE: 108 MMHG | RESPIRATION RATE: 18 BRPM | BODY MASS INDEX: 36.49 KG/M2 | DIASTOLIC BLOOD PRESSURE: 62 MMHG | TEMPERATURE: 96.8 F | WEIGHT: 219 LBS | OXYGEN SATURATION: 95 %

## 2021-04-15 DIAGNOSIS — R41.3 MEMORY LOSS: Primary | ICD-10-CM

## 2021-04-15 DIAGNOSIS — R41.3 MEMORY LOSS: ICD-10-CM

## 2021-04-15 PROCEDURE — 99204 OFFICE O/P NEW MOD 45 MIN: CPT | Performed by: NURSE PRACTITIONER

## 2021-04-15 NOTE — PROGRESS NOTES
Shahid Oakley presents today for   Chief Complaint   Patient presents with    Memory Loss    New Patient       Is someone accompanying this pt? Yes,    Is the patient using any DME equipment during OV? no    Depression Screening:  3 most recent PHQ Screens 3/2/2021   Little interest or pleasure in doing things Not at all   Feeling down, depressed, irritable, or hopeless Not at all   Total Score PHQ 2 0       Learning Assessment:  Learning Assessment 12/6/2018   PRIMARY LEARNER Patient   HIGHEST LEVEL OF EDUCATION - PRIMARY LEARNER  DID NOT GRADUATE HIGH SCHOOL   BARRIERS PRIMARY LEARNER NONE   CO-LEARNER CAREGIVER No   PRIMARY LANGUAGE ENGLISH   LEARNER PREFERENCE PRIMARY LISTENING   ANSWERED BY patient   RELATIONSHIP SELF       Abuse Screening:  Abuse Screening Questionnaire 3/2/2021   Do you ever feel afraid of your partner? N   Are you in a relationship with someone who physically or mentally threatens you? N   Is it safe for you to go home? Y       Fall Risk  Fall Risk Assessment, last 12 mths 3/2/2021   Able to walk? Yes   Fall in past 12 months? 0   Do you feel unsteady? 0   Are you worried about falling 0         Coordination of Care:  1. Have you been to the ER, urgent care clinic since your last visit? Hospitalized since your last visit? no    2. Have you seen or consulted any other health care providers outside of the [address] [place] [address] since your last visit? Include any pap smears or colon screening.  no

## 2021-04-15 NOTE — PROGRESS NOTES
[facility]  [address] [facility], [address], [place], [address]  [address] Eduardo Worrell, [address]  Office:  [phone]  Fax: [phone]    Referring: Claus Hameed MD      Chief Complaint   Patient presents with    Memory Loss     memory loss    New Patient       HPI:  This is a 44year old female who presents for new patient evaluation with chief complaint of memory. Significant history of anxiety, PTSD, and panic attacks per documentation. She endorses onset of symptoms was approximately 1 year ago. Denies inciting factor or injury. Having trouble with short-term memory loss. She is accompanied in office today by her fiancé. Says her family does notice a change in cognition and memory. She is repeating things. She is getting lost in familiar places. She denies having trouble performing activities of daily living. She bathes, feeds herself, and dresses with no issues. She does have help with her medications. Her fiancé tells me he has locked up her nighttime medications which include sleeping pills because she has taken them during the daytime. She lives at home with her fiancé. He handles the finances. She has adult children. She does not work. 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 She drives infrequently. Reports getting lost.  Denies disinhibition, aggression, or hallucinations. Denies overt incontinence, but does endorse occasional stress incontinence upon sneezing. Denies headaches, passing out, or waking up on the floor unsure how she got there. Denies wandering. Denies hallucinations. Positive family history of memory loss in her mother around age 48. She is on several psychiatric medications and is followed by psychiatrist [facility] L[facility]. Significant to 1 year ago, when cognitive changes she started on Ambien. On Ambien 12.5 mg. Said when she first started this she was sleepwalking.   She says she is not doing this now. She is also on Abilify, Xanax, amitriptyline, and Trintellix per documentation. Positive tobacco use.     Social History     Socioeconomic History    Marital status: UNKNOWN     Spouse name: Not on file    Number of children: Not on file    Years of education: Not on file    Highest education level: Not on file   Occupational History    Not on file   Social Needs    Financial resource strain: Not on file    Food insecurity     Worry: Not on file     Inability: Not on file    Transportation needs     Medical: Not on file     Non-medical: Not on file   Tobacco Use    Smoking status: Former Smoker     Packs/day: 0.50    Smokeless tobacco: Current User    Tobacco comment: patient stated she quit three weeks ago cig and now uses vape   Substance and Sexual Activity    Alcohol use: Yes     Comment: occ    Drug use: No    Sexual activity: Not on file   Lifestyle    Physical activity     Days per week: Not on file     Minutes per session: Not on file    Stress: Not on file   Relationships    Social connections     Talks on phone: Not on file     Gets together: Not on file     Attends Buddhist service: Not on file     Active member of club or organization: Not on file     Attends meetings of clubs or organizations: Not on file     Relationship status: Not on file    Intimate partner violence     Fear of current or ex partner: Not on file     Emotionally abused: Not on file     Physically abused: Not on file     Forced sexual activity: Not on file   Other Topics Concern    Not on file   Social History Narrative    ** Merged History Encounter **            Family History   Problem Relation Age of Onset    Heart Disease Mother     Dementia Mother     Heart Disease Father     Heart Disease Sister     MS Sister     Heart Disease Brother         3 heart attacks    Cancer Paternal Aunt         breast    Breast Cancer Maternal Aunt     Diabetes Neg Hx     Hypertension Neg Hx        Current Outpatient Medications   Medication Sig Dispense Refill    atorvastatin (LIPITOR) 40 mg tablet Take 1 tablet by mouth once daily 90 Tab 3    zolpidem CR (AMBIEN CR) 12.5 mg tablet Take 12.5 mg by mouth nightly.  albuterol (ACCUNEB) 0.63 mg/3 mL nebulizer solution Take  by inhalation as needed.  famotidine (PEPCID) 40 mg tablet Take 1 tablet by mouth once daily for 90 days 90 Tab 0    amitriptyline (ELAVIL) 100 mg tablet Take 200 mg by mouth nightly.  levothyroxine (SYNTHROID) 125 mcg tablet Take 125 mcg by mouth daily.  albuterol (PROVENTIL HFA, VENTOLIN HFA, PROAIR HFA) 90 mcg/actuation inhaler Albuterol inhaler, 2 puffs up to every 4 hours if needed for wheezing, cough, shortness of breath 1 Inhaler 11    fluticasone propionate (FLOVENT HFA) 110 mcg/actuation inhaler Take 2 Puffs by inhalation every twelve (12) hours. 1 Inhaler 11    vortioxetine (TRINTELLIX) 5 mg tablet Take  by mouth daily.  acetaminophen (TYLENOL EXTRA STRENGTH) 500 mg tablet Take  by mouth every six (6) hours as needed for Pain.  DULoxetine (CYMBALTA) 60 mg capsule Take 60 mg by mouth daily.  ARIPiprazole (ABILIFY) 10 mg tablet Take 10 mg by mouth daily.  ALPRAZolam (XANAX) 0.5 mg tablet Take 2 mg by mouth three (3) times daily as needed. Indications: patient taking 2 mg as needed      ergocalciferol (Vitamin D2) 1,250 mcg (50,000 unit) capsule Take 1 Cap by mouth every seven (7) days for 90 days. 12 Cap 0       Past Medical History:   Diagnosis Date    Breast cyst     Depression     Emphysema lung ([address]     1 year and a half it was mentioned by ER Doc.     Fibromyalgia     Hyperlipidemia     Hypothyroidism     Ovarian cyst        Past Surgical History:   Procedure Laterality Date    HX APPENDECTOMY      HX GYN      Cyst, Uterine Ablation    HX TUBAL LIGATION      IMPLANT BREAST SILICONE/EQ Bilateral     Approximately 1999    NV BREAST SURGERY PROCEDURE UNLISTED Allergies   Allergen Reactions    Bactrim [Sulfamethoprim] Unknown (comments)    Bactrim [Sulfamethoprim] Other (comments)     Thrush       Patient Active Problem List   Diagnosis Code    Current moderate episode of major depressive disorder ([facility] [address] F32.1    Hyperlipidemia E78.5    Acquired hypothyroidism E03.9    Fibromyalgia M79.7    Obesity (BMI 30-39. 9) E66.9    Tobacco use Z72.0    Gastroesophageal reflux disease K21.9    Lymphocytic colitis K52.832    Vitamin D deficiency E55.9    ISA (generalized anxiety disorder) F41.1    PTSD (post-traumatic stress disorder) F43.10    Panic attack F41.0    Chronic fatigue R53.82    Severe obesity (HCC) E66.01    Palpitations R00.2    Nephrolithiasis N20.0    GANT (dyspnea on exertion) R06.00         Review of Systems:   Constitutional: no fever or chills  Skin denies rash or itching  HEENT:  Denies tinnitus, hearing loss, or visual changes  Respiratory: denies shortness of breath  Cardiovascular: denies chest pain, dyspnea on exertion  Gastrointestinal: does not report nausea or vomiting  Genitourinary: does not report dysuria or incontinence  Musculoskeletal: does not report joint pain or swelling  Endocrine: denies weight change  Hematology: denies easy bruising or bleeding   Neurological: as above in HPI      PHYSICAL EXAMINATION:      VITAL SIGNS:    Visit Vitals  /62 (BP 1 Location: Left upper arm, BP Patient Position: Sitting, BP Cuff Size: Adult)   Pulse 92   Temp 96.8 °F (36 °C) (Temporal)   Resp 18   Ht 5' 5\" (1.651 m)   Wt 99.3 kg (219 lb)   SpO2 95%   BMI 36.44 kg/m²       GENERAL: Well developed, well nourished, in no apparent distress. HEART: RR, no murmurs heard, no carotid bruits  LUNGS:                      CTAB  EXTREMITIES: No clubbing, cyanosis, or edema is identified. Pulses 2+    and symmetrical.  HEAD:   Normocephalic, atraumatic. NEUROLOGIC EXAMINATION        MENTAL STATUS: Awake, alert, and oriented x 3.   She said she cannot identify the name of the location whether it is [place] or [facility]. She accurately identifies 6 quarters in a $1.50. She is unable to spell world backwards. Recognition and recall 3 out of 3. Attention is abnormal.  Positive repeating. There is no aphasia. Fund of knowledge is adequate. Mood and affect are appropriate  CRANIAL NERVES: Visual fields are full to confrontation. No fundus anomalies observed. Pupils are reactive to light and accommodation. Extraocular movements are intact and there is no nystagmus. Facial sensation is normal  Face is symmetrical.   Hearing is grossly intact. SCM/TPZ 5/5  Palate rises symmetrically. Tongue is in the midline. MOTOR:   Normal tone, bulk, and strength, 5/5 muscle strength throughout. No cogwheel rigidity or clonus present. CEREBELLAR: Finger to nose was normal.   No tremors or dysmetria    SENSORY:  Normal PP, vibration, propioception. Romberg negative    DTR's:   +2 throughout, toes downgoing     GAIT:   Normal gait      Impression/Plan  Kimberlyn Johnson is a 44 y.o. female whose history and physical are consistent with memory loss starting 1 year ago with risk factors including family history. She reports her mother started having memory trouble in her 46s and was diagnosed with dementia. Significant history of psychiatric disorder. Denies inciting factor injury. Endorses short-term memory loss. Positive repeating and getting lost in familiar places. She has mistakenly taken her evening pills during the day. Her fiancé has now locked up her nighttime medications which do include Ambien 12.5 mg per documentation. There is significant evidence of increased side effects in women with Ambien doses greater than 5 mg. Also on several other psychiatric medications including Abilify, Trintellix, Xanax, and amitriptyline. Consideration for polypharmacy affecting cognitive functioning.   At this point will defer medication management and recommendations to her psychiatrist.  Discussed safety. At this point patient should not be driving pending results of formal evaluation. They verbalized understanding. We will move forward with MRI of the brain looking for any significant atrophy, infarct, or other physiological changes that may be attributed to her memory loss. We will order EEG looking for any underlying abnormality. Her primary care provider has already ordered a B12 level which by report was on the low side of normal. She also had thyroid function testing complete by her PCP. She will follow-up after all testing is complete. All questions addressed and patient and patient's fiancé are agreeable with plan of care. Diagnoses and all orders for this visit:    1. Memory loss  -     MRI BRAIN WO CONT; Future  -     REFERRAL TO NEUROPSYCHOLOGY  -     EEG AWAKE; Future        I spent 45 minutes with the patient in face-to-face consultation, with 30 minutes spent in counseling and coordination of care as described above. This note will not be viewable in [address] Signed By: Cristian Daley NP        PLEASE NOTE:   Portions of this document may have been produced using voice recognition software. Unrecognized errors in transcription may be present.

## 2021-04-16 DIAGNOSIS — R41.3 MEMORY LOSS: ICD-10-CM

## 2021-04-16 DIAGNOSIS — E78.5 HYPERLIPIDEMIA, UNSPECIFIED HYPERLIPIDEMIA TYPE: ICD-10-CM

## 2021-04-16 DIAGNOSIS — K21.9 GASTROESOPHAGEAL REFLUX DISEASE WITHOUT ESOPHAGITIS: ICD-10-CM

## 2021-04-16 NOTE — TELEPHONE ENCOUNTER
Pt called in refill.      Requested Prescriptions     Pending Prescriptions Disp Refills    famotidine (PEPCID) 40 mg tablet [Pharmacy Med Name: Famotidine 40 MG Oral Tablet] 90 Tab 0     Sig: Take 1 tablet by mouth once daily    atorvastatin (LIPITOR) 40 mg tablet 90 Tab 3

## 2021-04-18 RX ORDER — ATORVASTATIN CALCIUM 40 MG/1
TABLET, FILM COATED ORAL
Qty: 90 TAB | Refills: 3 | Status: SHIPPED | OUTPATIENT
Start: 2021-04-18 | End: 2022-03-05

## 2021-04-18 RX ORDER — FAMOTIDINE 40 MG/1
TABLET, FILM COATED ORAL
Qty: 90 TAB | Refills: 0 | Status: SHIPPED | OUTPATIENT
Start: 2021-04-18 | End: 2021-07-20

## 2021-04-21 ENCOUNTER — TELEPHONE (OUTPATIENT)
Dept: FAMILY MEDICINE CLINIC | Age: 51
End: 2021-04-21

## 2021-04-21 DIAGNOSIS — E55.9 VITAMIN D DEFICIENCY: ICD-10-CM

## 2021-04-23 RX ORDER — ERGOCALCIFEROL 1.25 MG/1
CAPSULE ORAL
Qty: 12 CAP | Refills: 0 | Status: SHIPPED | OUTPATIENT
Start: 2021-04-23 | End: 2021-06-14 | Stop reason: SDUPTHER

## 2021-04-28 ENCOUNTER — OFFICE VISIT (OUTPATIENT)
Dept: NEUROLOGY | Age: 51
End: 2021-04-28
Payer: MEDICAID

## 2021-04-28 DIAGNOSIS — R41.3 MEMORY LOSS: Primary | ICD-10-CM

## 2021-04-28 DIAGNOSIS — Z86.59 HISTORY OF PANIC ATTACKS: ICD-10-CM

## 2021-04-28 DIAGNOSIS — Z86.59 HISTORY OF POSTTRAUMATIC STRESS DISORDER (PTSD): ICD-10-CM

## 2021-04-28 DIAGNOSIS — R45.89 DEPRESSED MOOD: ICD-10-CM

## 2021-04-28 DIAGNOSIS — F41.8 ANXIETY ABOUT HEALTH: ICD-10-CM

## 2021-04-28 PROCEDURE — 90791 PSYCH DIAGNOSTIC EVALUATION: CPT | Performed by: PSYCHOLOGIST

## 2021-05-03 ENCOUNTER — DOCUMENTATION ONLY (OUTPATIENT)
Dept: NEUROLOGY | Age: 51
End: 2021-05-03

## 2021-05-03 NOTE — PROGRESS NOTES
Neuropsychological testing authorization request faxed to [facility] on 5/3/21

## 2021-05-10 ENCOUNTER — HOSPITAL ENCOUNTER (OUTPATIENT)
Dept: NEUROLOGY | Age: 51
Discharge: HOME | End: 2021-05-10
Attending: NURSE PRACTITIONER
Payer: MEDICAID

## 2021-05-10 ENCOUNTER — HOSPITAL ENCOUNTER (OUTPATIENT)
Dept: MRI IMAGING | Age: 51
Discharge: HOME | End: 2021-05-10
Attending: NURSE PRACTITIONER
Payer: MEDICAID

## 2021-05-10 PROCEDURE — 70551 MRI BRAIN STEM W/O DYE: CPT

## 2021-05-10 PROCEDURE — 95816 EEG AWAKE AND DROWSY: CPT

## 2021-05-14 NOTE — PROCEDURES
[address] [place] [address] Dr DE LA TORRE    Name:  Ryanne Alvarado  MR#:   [id number]  :  1970  ACCOUNT #:  [de-identified]  DATE OF SERVICE:  05/10/2021    OUTPATIENT RECORDING    REFERRING PROVIDER:  EEG was ordered by Ted Munoz NP    REASON FOR EEG:  For evaluation of memory problems. MEDICATIONS:  The patient's medications at the time of recording include Ambien and Elavil. EEG TECHNIQUE USED:  A standard 10-20 system with 16-channel recording and an EKG. Activation procedure used was photic stimulation. Total duration of this recording was 26 minutes. This is mainly an awake EEG recording. EEG REPORT:  The background consists of posterior dominant alpha rhythms at a frequency of 10-11 Hz and they attenuate to eye opening. No significant asymmetry between the right and left sides. There are no obvious spikes or sharp wave discharges. No focal slowing. Photic stimulation did not induce any abnormal discharges. IMPRESSION:  This is basically a normal EEG recording. CLINICAL CORRELATION:  Normal EEG does not rule out the possibility of seizures or epilepsy.       MD JOANNE Meadows [id number]  D:  2021 13:39  T:  2021 16:43  JOB #:  [id number]

## 2021-05-18 ENCOUNTER — OFFICE VISIT (OUTPATIENT)
Dept: NEUROLOGY | Age: 51
End: 2021-05-18
Payer: MEDICAID

## 2021-05-18 DIAGNOSIS — F41.8 ANXIETY WITH SOMATIC FEATURES: ICD-10-CM

## 2021-05-18 DIAGNOSIS — F43.10 PTSD (POST-TRAUMATIC STRESS DISORDER): ICD-10-CM

## 2021-05-18 DIAGNOSIS — F33.2 SEVERE EPISODE OF RECURRENT MAJOR DEPRESSIVE DISORDER, WITHOUT PSYCHOTIC FEATURES (HCC): ICD-10-CM

## 2021-05-18 DIAGNOSIS — F09 MILD COGNITIVE DISORDER: Primary | ICD-10-CM

## 2021-05-18 PROCEDURE — 96132 NRPSYC TST EVAL PHYS/QHP 1ST: CPT | Performed by: PSYCHOLOGIST

## 2021-05-18 PROCEDURE — 96137 PSYCL/NRPSYC TST PHY/QHP EA: CPT | Performed by: PSYCHOLOGIST

## 2021-05-18 PROCEDURE — 96138 PSYCL/NRPSYC TECH 1ST: CPT | Performed by: PSYCHOLOGIST

## 2021-05-18 PROCEDURE — 96136 PSYCL/NRPSYC TST PHY/QHP 1ST: CPT | Performed by: PSYCHOLOGIST

## 2021-05-18 PROCEDURE — 96133 NRPSYC TST EVAL PHYS/QHP EA: CPT | Performed by: PSYCHOLOGIST

## 2021-05-18 PROCEDURE — 96139 PSYCL/NRPSYC TST TECH EA: CPT | Performed by: PSYCHOLOGIST
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
## 2021-05-25 ENCOUNTER — VIRTUAL VISIT (OUTPATIENT)
Dept: NEUROLOGY | Age: 51
End: 2021-05-25
Payer: MEDICAID

## 2021-05-25 DIAGNOSIS — F43.10 PTSD (POST-TRAUMATIC STRESS DISORDER): ICD-10-CM

## 2021-05-25 DIAGNOSIS — F33.2 SEVERE EPISODE OF RECURRENT MAJOR DEPRESSIVE DISORDER, WITHOUT PSYCHOTIC FEATURES (HCC): ICD-10-CM

## 2021-05-25 DIAGNOSIS — F41.8 ANXIETY WITH SOMATIC FEATURES: ICD-10-CM

## 2021-05-25 DIAGNOSIS — F09 MILD COGNITIVE DISORDER: Primary | ICD-10-CM

## 2021-05-25 PROCEDURE — 90832 PSYTX W PT 30 MINUTES: CPT | Performed by: PSYCHOLOGIST

## 2021-05-25 NOTE — PROGRESS NOTES
Interactive Psychotherapy/office feedback        Interactive office feedback session with Ms. Dayna Cordoba. I reviewed the results of the recent Neuropsychological Evaluation  including the observed areas of neurocognitive strengths and weaknesses. Education was provided regarding my diagnostic impressions, and treatment plan/options were discussed. I also answered numerous questions related to the clinical findings, including the various methods to improve cognition and mood. CBT, psychoeducation, and supportive psychotherapy techniques were utilized. [employer] provided. Prior to seeing the patient I reviewed the records, including the previously completed report, the records in [place], and any updated visits from other providers since I saw the patient last.       Diagnoses:      1. Invalid Neuropsychological Evaluation  2. PTSD, severe and complex  3. Major Depressive Disorder, severe, without psychotic features versus Bipolar II Disorder  4. Anxiety with somatic features R/O Conversion and Somatization Disorders  5. Borderline Personality Traits                  The patient will follow up with the referring provider, and reported being very pleased with the services provided. Follow up with [facility] 12 months. [id number] psychotherapy 30 Minutes      This note was created using voice recognition software. Despite editing, there may be syntax errors. Giovanny Dinh is a 44 y.o. female who was evaluated by an audio-video encounter for concerns as above. Patient identification was verified prior to start of the visit. Pursuant to the emergency declaration under the [facility] waiver authority and the [employer] Act, this Virtual Visit was conducted, with patient's (and/or legal guardian's) consent, to reduce the patient's risk of exposure to COVID-19 and provide necessary medical care. Services were provided through a synchronous discussion virtually to substitute for in-person clinic visit. I was at home. The patient was at home.

## 2021-06-14 ENCOUNTER — APPOINTMENT (OUTPATIENT)
Dept: FAMILY MEDICINE CLINIC | Age: 51
End: 2021-06-14

## 2021-06-14 ENCOUNTER — HOSPITAL ENCOUNTER (OUTPATIENT)
Dept: LAB | Age: 51
Discharge: HOME | End: 2021-06-14
Payer: MEDICAID

## 2021-06-14 ENCOUNTER — OFFICE VISIT (OUTPATIENT)
Dept: FAMILY MEDICINE CLINIC | Age: 51
End: 2021-06-14
Payer: MEDICAID

## 2021-06-14 VITALS
HEART RATE: 75 BPM | WEIGHT: 222.8 LBS | TEMPERATURE: 97.4 F | OXYGEN SATURATION: 96 % | SYSTOLIC BLOOD PRESSURE: 106 MMHG | BODY MASS INDEX: 37.12 KG/M2 | RESPIRATION RATE: 16 BRPM | DIASTOLIC BLOOD PRESSURE: 75 MMHG | HEIGHT: 65 IN

## 2021-06-14 DIAGNOSIS — M79.7 FIBROMYALGIA: ICD-10-CM

## 2021-06-14 DIAGNOSIS — E03.9 ACQUIRED HYPOTHYROIDISM: ICD-10-CM

## 2021-06-14 DIAGNOSIS — K21.9 GASTROESOPHAGEAL REFLUX DISEASE WITHOUT ESOPHAGITIS: ICD-10-CM

## 2021-06-14 DIAGNOSIS — Z13.1 SCREENING FOR DIABETES MELLITUS (DM): ICD-10-CM

## 2021-06-14 DIAGNOSIS — Z11.59 NEED FOR HEPATITIS C SCREENING TEST: ICD-10-CM

## 2021-06-14 DIAGNOSIS — Z72.0 TOBACCO USE: ICD-10-CM

## 2021-06-14 DIAGNOSIS — F43.10 PTSD (POST-TRAUMATIC STRESS DISORDER): ICD-10-CM

## 2021-06-14 DIAGNOSIS — E78.5 HYPERLIPIDEMIA, UNSPECIFIED HYPERLIPIDEMIA TYPE: ICD-10-CM

## 2021-06-14 DIAGNOSIS — E66.01 SEVERE OBESITY (HCC): Primary | ICD-10-CM

## 2021-06-14 DIAGNOSIS — E55.9 VITAMIN D DEFICIENCY: ICD-10-CM

## 2021-06-14 LAB
25(OH)D3 SERPL-MCNC: 30.3 NG/ML (ref 30–100)
ALBUMIN SERPL-MCNC: 4 G/DL (ref 3.4–5)
ALBUMIN/GLOB SERPL: 1.3 {RATIO} (ref 0.8–1.7)
ALP SERPL-CCNC: 84 U/L (ref 45–117)
ALT SERPL-CCNC: 43 U/L (ref 13–56)
ANION GAP SERPL CALC-SCNC: 4 MMOL/L (ref 3–18)
AST SERPL-CCNC: 21 U/L (ref 10–38)
BILIRUB SERPL-MCNC: 0.6 MG/DL (ref 0.2–1)
BUN SERPL-MCNC: 17 MG/DL (ref 7–18)
BUN/CREAT SERPL: 17 (ref 12–20)
CALCIUM SERPL-MCNC: 9.1 MG/DL (ref 8.5–10.1)
CHLORIDE SERPL-SCNC: 106 MMOL/L (ref 100–111)
CO2 SERPL-SCNC: 30 MMOL/L (ref 21–32)
CREAT SERPL-MCNC: 0.99 MG/DL (ref 0.6–1.3)
EST. AVERAGE GLUCOSE BLD GHB EST-MCNC: 114 MG/DL
GLOBULIN SER CALC-MCNC: 3.1 G/DL (ref 2–4)
GLUCOSE SERPL-MCNC: 118 MG/DL (ref 74–99)
HBA1C MFR BLD: 5.6 % (ref 4.2–5.6)
POTASSIUM SERPL-SCNC: 4.6 MMOL/L (ref 3.5–5.5)
PROT SERPL-MCNC: 7.1 G/DL (ref 6.4–8.2)
SODIUM SERPL-SCNC: 140 MMOL/L (ref 136–145)
T4 FREE SERPL-MCNC: 0.9 NG/DL (ref 0.7–1.5)
TSH SERPL DL<=0.05 MIU/L-ACNC: 3.25 UIU/ML (ref 0.36–3.74)

## 2021-06-14 PROCEDURE — 99214 OFFICE O/P EST MOD 30 MIN: CPT | Performed by: LEGAL MEDICINE

## 2021-06-14 PROCEDURE — 84439 ASSAY OF FREE THYROXINE: CPT

## 2021-06-14 PROCEDURE — 80053 COMPREHEN METABOLIC PANEL: CPT

## 2021-06-14 PROCEDURE — 86803 HEPATITIS C AB TEST: CPT

## 2021-06-14 PROCEDURE — 36415 COLL VENOUS BLD VENIPUNCTURE: CPT

## 2021-06-14 PROCEDURE — 83036 HEMOGLOBIN GLYCOSYLATED A1C: CPT

## 2021-06-14 PROCEDURE — 82306 VITAMIN D 25 HYDROXY: CPT

## 2021-06-14 RX ORDER — NICOTINE 7MG/24HR
1 PATCH, TRANSDERMAL 24 HOURS TRANSDERMAL EVERY 24 HOURS
Qty: 30 PATCH | Refills: 0 | Status: SHIPPED | OUTPATIENT
Start: 2021-06-14 | End: 2021-07-14

## 2021-06-14 RX ORDER — IBUPROFEN 200 MG
1 TABLET ORAL EVERY 24 HOURS
Qty: 30 PATCH | Refills: 0 | Status: SHIPPED | OUTPATIENT
Start: 2021-06-14 | End: 2022-01-05 | Stop reason: SDUPTHER

## 2021-06-14 RX ORDER — IBUPROFEN 200 MG
1 TABLET ORAL EVERY 24 HOURS
Qty: 30 PATCH | Refills: 0 | Status: SHIPPED | OUTPATIENT
Start: 2021-06-14 | End: 2021-07-14

## 2021-06-14 RX ORDER — ERGOCALCIFEROL 1.25 MG/1
50000 CAPSULE ORAL
Qty: 12 CAPSULE | Refills: 3 | Status: SHIPPED | OUTPATIENT
Start: 2021-06-14 | End: 2021-10-19 | Stop reason: SDUPTHER

## 2021-06-14 NOTE — PROGRESS NOTES
Adia Olvera     Chief Complaint   Patient presents with    Weight Management     wants med for weight loss     Follow Up Chronic Condition    Medication Refill     vit D nicotine patches      Vitals:    06/14/21 0957   BP: 106/75   Pulse: 75   Resp: 16   Temp: 97.4 °F (36.3 °C)   TempSrc: Temporal   SpO2: 96%   Weight: 222 lb 12.8 oz (101.1 kg)   Height: 5' 5\" (1.651 m)         HPI: Patient is here for follow-up also she is requesting medication refill on vitamin D as well as her nicotine patches  She is currently concerned about her weight, and not able to lose weight    Advised patient to be referred to dietitian she declined states she knows what she needed to do but she is not doing it      I discussed with patient at length that there is a behavioral modification changes need to happen because if she takes medication then after losing weight and the weight will come back, I recommended that she join weight watcher, or she can join online [employer] , that would help her to learn portion control and healthier and less calories     Past Medical History:   Diagnosis Date    Breast cyst     Depression     Emphysema lung ([address]     1 year and a half it was mentioned by ER Doc.     Fibromyalgia     Hyperlipidemia     Hypothyroidism     Ovarian cyst      Past Surgical History:   Procedure Laterality Date    HX APPENDECTOMY      HX GYN      Cyst, Uterine Ablation    HX TUBAL LIGATION      IMPLANT BREAST SILICONE/EQ Bilateral     Approximately 1999    RI BREAST SURGERY PROCEDURE UNLISTED       Social History     Tobacco Use    Smoking status: Current Every Day Smoker     Packs/day: 0.50     Types: Cigarettes    Smokeless tobacco: Current User    Tobacco comment: patient stated she quit three weeks ago cig and now uses vape   Substance Use Topics    Alcohol use: Yes     Comment: occ       Family History   Problem Relation Age of Onset    Heart Disease Mother     Dementia Mother     Heart Disease Father  Heart Disease Sister     MS Sister     Heart Disease Brother         3 heart attacks    Cancer Paternal Aunt         breast    Breast Cancer Maternal Aunt     Diabetes Neg Hx     Hypertension Neg Hx        Review of Systems   Constitutional: Negative for chills, fever, malaise/fatigue and weight loss. HENT: Negative for congestion, ear discharge, ear pain, hearing loss, nosebleeds, sinus pain and sore throat. Eyes: Negative for blurred vision, double vision and discharge. Respiratory: Positive for cough. Negative for hemoptysis, sputum production, shortness of breath and wheezing. Cardiovascular: Negative for chest pain, palpitations, claudication and leg swelling. Gastrointestinal: Positive for heartburn. Negative for abdominal pain, blood in stool, constipation, diarrhea, melena, nausea and vomiting. Genitourinary: Negative for dysuria, frequency and urgency. Musculoskeletal: Positive for joint pain and myalgias. Negative for back pain, falls and neck pain. Skin: Negative for itching and rash. Neurological: Positive for dizziness. Negative for tingling, sensory change, speech change, focal weakness, weakness and headaches. Psychiatric/Behavioral: Positive for depression. Negative for hallucinations, memory loss, substance abuse and suicidal ideas. The patient is not nervous/anxious and does not have insomnia. Physical Exam  Vitals and nursing note reviewed. Constitutional:       General: She is not in acute distress. Appearance: She is well-developed. She is not diaphoretic. HENT:      Head: Normocephalic and atraumatic. Eyes:      General: No scleral icterus. Right eye: No discharge. Left eye: No discharge. Neck:      Thyroid: No thyromegaly. Cardiovascular:      Rate and Rhythm: Normal rate and regular rhythm. Heart sounds: Normal heart sounds. Pulmonary:      Effort: Pulmonary effort is normal. No respiratory distress.       Breath sounds: Normal breath sounds. No wheezing or rales. Chest:      Chest wall: No tenderness. Abdominal:      General: There is no distension. Palpations: Abdomen is soft. Tenderness: There is no abdominal tenderness. There is no rebound. Musculoskeletal:         General: No tenderness or deformity. Normal range of motion. Lymphadenopathy:      Cervical: No cervical adenopathy. Skin:     General: Skin is warm and dry. Coloration: Skin is not pale. Findings: No erythema or rash. Neurological:      Mental Status: She is alert and oriented to person, place, and time. Cranial Nerves: No cranial nerve deficit. Coordination: Coordination normal.   Psychiatric:         Behavior: Behavior normal.         Thought Content: Thought content normal.         Judgment: Judgment normal.          Assessment and plan     Plan of care has been discussed with the patient, he agrees to the plan and verbalized understanding. All his questions were answered  More than 50% of the time spent in this visit was counseling the patient about  illness and treatment options         1. Vitamin D deficiency    - ergocalciferol (ERGOCALCIFEROL) 1,250 mcg (50,000 unit) capsule; Take 1 Capsule by mouth every seven (7) days for 90 days. Dispense: 12 Capsule; Refill: 3  - VITAMIN D, 25 HYDROXY; Future    2. Severe obesity ([address]  Lifestyle modification has been discussed with patient in details, decrease carbohydrates, decrease or eliminate sugar intake, gradually increase physical activity as tolerated to be about 4 to 5 hours a week. 3. Acquired hypothyroidism  Patient is adherent to medication as directed    - METABOLIC PANEL, COMPREHENSIVE; Future  - TSH AND FREE T4; Future    4. Fibromyalgia  Stable she is still complaining of generalized body ache that is slightly improved with gabapentin    5. PTSD (post-traumatic stress disorder)  Stable and she is following up with psychiatrist    6.  Gastroesophageal reflux disease without esophagitis  She take Pepcid 40 mg daily    7. Hyperlipidemia, unspecified hyperlipidemia type  She is adherent to Lipitor 40 mg daily  - METABOLIC PANEL, COMPREHENSIVE; Future    8. Tobacco use  Patient will go back to nicotine patches and taper off over 3 months  - LIPID PANEL; Future  - nicotine (NICODERM CQ) 21 mg/24 hr; 1 Patch by TransDERmal route every twenty-four (24) hours for 30 days. Dispense: 30 Patch; Refill: 0  - nicotine (NICODERM CQ) 14 mg/24 hr patch; 1 Patch by TransDERmal route every twenty-four (24) hours for 30 days. Dispense: 30 Patch; Refill: 0  - nicotine (NICODERM CQ) 7 mg/24 hr; 1 Patch by TransDERmal route every twenty-four (24) hours for 30 days. Dispense: 30 Patch; Refill: 0    9. Need for hepatitis C screening test    - HEPATITIS C AB; Future    10. Screening for diabetes mellitus (DM)    - HEMOGLOBIN A1C W/O EAG; Future    Current Outpatient Medications   Medication Sig Dispense Refill    ergocalciferol (ERGOCALCIFEROL) 1,250 mcg (50,000 unit) capsule Take 1 Capsule by mouth every seven (7) days for 90 days. 12 Capsule 3    nicotine (NICODERM CQ) 21 mg/24 hr 1 Patch by TransDERmal route every twenty-four (24) hours for 30 days. 30 Patch 0    nicotine (NICODERM CQ) 14 mg/24 hr patch 1 Patch by TransDERmal route every twenty-four (24) hours for 30 days. 30 Patch 0    nicotine (NICODERM CQ) 7 mg/24 hr 1 Patch by TransDERmal route every twenty-four (24) hours for 30 days. 30 Patch 0    famotidine (PEPCID) 40 mg tablet Take 1 tablet by mouth once daily 90 Tab 0    atorvastatin (LIPITOR) 40 mg tablet Take 1 tablet by mouth once daily 90 Tab 3    zolpidem CR (AMBIEN CR) 12.5 mg tablet Take 12.5 mg by mouth nightly.  amitriptyline (ELAVIL) 100 mg tablet Take 200 mg by mouth nightly.  levothyroxine (SYNTHROID) 125 mcg tablet Take 125 mcg by mouth daily.       albuterol (PROVENTIL HFA, VENTOLIN HFA, PROAIR HFA) 90 mcg/actuation inhaler Albuterol inhaler, 2 puffs up to every 4 hours if needed for wheezing, cough, shortness of breath 1 Inhaler 11    fluticasone propionate (FLOVENT HFA) 110 mcg/actuation inhaler Take 2 Puffs by inhalation every twelve (12) hours. 1 Inhaler 11    vortioxetine (TRINTELLIX) 5 mg tablet Take  by mouth daily.  acetaminophen (TYLENOL EXTRA STRENGTH) 500 mg tablet Take  by mouth every six (6) hours as needed for Pain.  DULoxetine (CYMBALTA) 60 mg capsule Take 60 mg by mouth daily.  ARIPiprazole (ABILIFY) 10 mg tablet Take 10 mg by mouth daily.  ALPRAZolam (XANAX) 0.5 mg tablet Take 2 mg by mouth three (3) times daily as needed.  Indications: patient taking 2 mg as needed         Patient Active Problem List    Diagnosis Date Noted    Palpitations 10/27/2020    Nephrolithiasis 10/27/2020    GANT (dyspnea on exertion) 10/27/2020    Severe obesity ([address] 08/07/2020    ISA (generalized anxiety disorder) 05/12/2020    PTSD (post-traumatic stress disorder) 05/12/2020    Panic attack 05/12/2020    Chronic fatigue 05/12/2020    Vitamin D deficiency 03/30/2019    Current moderate episode of major depressive disorder ([address] 12/06/2018    Hyperlipidemia 12/06/2018    Acquired hypothyroidism 12/06/2018    Fibromyalgia 12/06/2018    Obesity (BMI 30-39.9) 12/06/2018    Tobacco use 12/06/2018    Gastroesophageal reflux disease 12/06/2018    Lymphocytic colitis 12/06/2018     Results for orders placed or performed during the hospital encounter of 10/28/20   ECHO ADULT COMPLETE   Result Value Ref Range    IVSd 0.92 (A) 0.60 - 0.90 cm    LVIDd 5.05 3.90 - 5.30 cm    LVIDs 3.35 cm    LVOT d 2.14 cm    LVPWd 0.70 0.60 - 0.90 cm    BP EF 55.3 55.0 - 100.0 %    LV Ejection Fraction MOD 2C 53 %    LV Ejection Fraction MOD 4C 58 %    LV ED Vol A2C 97.3 mL    LV ED Vol A4C 93.0 mL    LV ED Vol BP 95.7 56.0 - 104.0 mL    LV ES Vol A2C 45.4 mL    LV ES Vol A4C 39.2 mL    LV ES Vol BP 42.8 19.0 - 49.0 mL    LVOT Peak Gradient 2.70 mmHg    Left Ventricular Outflow Tract Mean Gradient 1.95642835308633 mmHg    LVOT SV 65.4 ml    LVOT VTI 18.24 cm    Left Atrium Major Axis 3.16 cm    LA Volume 58.57 22.0 - 52.0 mL    LA Area 4C 17.3 cm2    LA Vol 2C 67.52 (A) 22.00 - 52.00 mL    LA Vol 4C 45.39 22.00 - 52.00 mL    MV A Compa 60.98 cm/s    Mitral Valve E Wave Deceleration Time 135.9 ms    MV E Compa 103.30 cm/s    E/E' lateral 11.04     E/E' septal 10.91     LV E' Lateral Velocity 9.36 cm/s    LV E' Septal Velocity 9.47 cm/s    Tapse 1.81 1.50 - 2.00 cm    AO ARCH D 2.88 cm    Aortic Sinotubular Junction Size 3.17 cm    Ao Root D 3.32 cm    IVC proximal 1.96 cm    MV E/A 1.69     LV Mass .3 67.0 - 162.0 g    LV Mass AL Index 66.7 43.0 - 95.0 g/m2    E/E' ratio (averaged) 10.97     LVES Vol Index BP 20.4 mL/m2    LVED Vol Index BP 45.5 mL/m2    Left Atrium Minor Axis 1.50 cm    LA Vol Index 27.85 16.00 - 28.00 ml/m2    LA Vol Index 32.11 16.00 - 28.00 ml/m2    LA Vol Index 21.58 16.00 - 28.00 ml/m2    LVED Vol Index A4C 44.2 mL/m2    LVED Vol Index A2C 46.3 mL/m2    LVES Vol Index A4C 18.6 mL/m2    LVES Vol Index A2C 21.6 mL/m2    AO ASC D 3.17 cm    Left Atrium to Aortic Root Ratio 0.95     Inferior Vena Cava Diameter Sniffing 1.96 cm    LA Area 2C 21.10 cm2    Right Atrium Minor Axis 4.10 cm    Left Ventricular Fractional Shortening by 2D 88.016938943 %    Left Ventricular Outflow Tract Mean Velocity 0.9494489002 cm/s    Mitral Valve Deceleration [place] 0.383319280     Left Ventricular End Diastolic Volume by Teichholz Method 0.5678133707 mL    Left Ventricular End Systolic Volume by Teichholz Method 6.47856976091 mL    Left Ventricular Stroke Volume by 2-D Biplane-MOD 27.802515452 mL    Left Ventricular Stroke Volume by 2-D Single Plane- MOD 61.718524377 mL    Left Ventricular Stroke Volume by Teichholz Method 29.885844704 mL    Left Ventricular Stroke Volume by 2-D Single Plane- MOD 90.477373939 mL     No visits with results within 3 Month(s) from this visit.    Latest known visit with results is:   Hospital Outpatient Visit on 10/28/2020   Component Date Value Ref Range Status    IVSd 10/28/2020 0.92* 0.60 - 0.90 cm Final    LVIDd 10/28/2020 5.05  3.90 - 5.30 cm Final    LVIDs 10/28/2020 3.35  cm Final    LVOT d 10/28/2020 2.14  cm Final    LVPWd 10/28/2020 0.70  0.60 - 0.90 cm Final    BP EF 10/28/2020 55.3  55.0 - 100.0 % Final    LV Ejection Fraction MOD 2C 10/28/2020 53  % Final    LV Ejection Fraction MOD 4C 10/28/2020 58  % Final    LV ED Vol A2C 10/28/2020 97.3  mL Final    LV ED Vol A4C 10/28/2020 93.0  mL Final    LV ED Vol BP 10/28/2020 95.7  56.0 - 104.0 mL Final    LV ES Vol A2C 10/28/2020 45.4  mL Final    LV ES Vol A4C 10/28/2020 39.2  mL Final    LV ES Vol BP 10/28/2020 42.8  19.0 - 49.0 mL Final    LVOT Peak Gradient 10/28/2020 2.70  mmHg Final    Left Ventricular Outflow Tract Diana* 10/28/2020 1.61346237378958  mmHg Final    LVOT SV 10/28/2020 65.4  ml Final    LVOT VTI 10/28/2020 18.24  cm Final    Left Atrium Major Axis 10/28/2020 3.16  cm Final    LA Volume 10/28/2020 58.57  22.0 - 52.0 mL Final    LA Area 4C 10/28/2020 17.3  cm2 Final    LA Vol 2C 10/28/2020 67.52* 22.00 - 52.00 mL Final    LA Vol 4C 10/28/2020 45.39  22.00 - 52.00 mL Final    MV A Compa 10/28/2020 60.98  cm/s Final    Mitral Valve E Wave Deceleration T* 10/28/2020 135.9  ms Final    MV E Compa 10/28/2020 103.30  cm/s Final    E/E' lateral 10/28/2020 11.04   Final    E/E' septal 10/28/2020 10.91   Final    LV E' Lateral Velocity 10/28/2020 9.36  cm/s Final    LV E' Septal Velocity 10/28/2020 9.47  cm/s Final    Tapse 10/28/2020 1.81  1.50 - 2.00 cm Final    AO ARCH D 10/28/2020 2.88  cm Final    Aortic Sinotubular Junction Size 10/28/2020 3.17  cm Final    Ao Root D 10/28/2020 3.32  cm Final    IVC proximal 10/28/2020 1.96  cm Final    MV E/A 10/28/2020 1.69   Final    LV Mass AL 10/28/2020 140.3  67.0 - 162.0 g Final    LV Mass AL Index 10/28/2020 66.7  43.0 - 95.0 g/m2 Final    E/E' ratio (averaged) 10/28/2020 10.97   Final    LVES Vol Index BP 10/28/2020 20.4  mL/m2 Final    LVED Vol Index BP 10/28/2020 45.5  mL/m2 Final    Left Atrium Minor Axis 10/28/2020 1.50  cm Final    LA Vol Index 10/28/2020 27.85  16.00 - 28.00 ml/m2 Final    LA Vol Index 10/28/2020 32.11  16.00 - 28.00 ml/m2 Final    LA Vol Index 10/28/2020 21.58  16.00 - 28.00 ml/m2 Final    LVED Vol Index A4C 10/28/2020 44.2  mL/m2 Final    LVED Vol Index A2C 10/28/2020 46.3  mL/m2 Final    LVES Vol Index A4C 10/28/2020 18.6  mL/m2 Final    LVES Vol Index A2C 10/28/2020 21.6  mL/m2 Final    AO ASC D 10/28/2020 3.17  cm Final    Left Atrium to Aortic Root Ratio 10/28/2020 0.95   Final    Inferior Vena Cava Diameter Sniffi* 10/28/2020 1.96  cm Final    LA Area 2C 10/28/2020 21.10  cm2 Final    Right Atrium Minor Axis 10/28/2020 4.10  cm Final    Left Ventricular Fractional Shorte* 10/28/2020 48.702183727  % Final    Left Ventricular Outflow Tract Diana* 10/28/2020 7.8616490444  cm/s Final    Mitral Valve Deceleration [place] 10/28/2020 5.222864308   Final    Left Ventricular End Diastolic Vol* 29/12/6959 0.6789453726  mL Final    Left Ventricular End Systolic Volu* 13/68/4576 8.19454718179  mL Final    Left Ventricular Stroke Volume by * 10/28/2020 03.844506632  mL Final    Left Ventricular Stroke Volume by * 10/28/2020 02.583089622  mL Final    Left Ventricular Stroke Volume by * 10/28/2020 09.593535937  mL Final    Left Ventricular Stroke Volume by * 10/28/2020 53.194229454  mL Final          Follow-up and Dispositions    · Return in about 3 months (around 9/14/2021).

## 2021-06-14 NOTE — PROGRESS NOTES
Panda Martinez is a 44 y.o. female (: 1970) presenting to address:    Chief Complaint   Patient presents with    Weight Management     wants med for weight loss        Vitals:    21 0957   BP: 106/75   Pulse: 75   Resp: 16   Temp: 97.4 °F (36.3 °C)   TempSrc: Temporal   SpO2: 96%   Weight: 222 lb 12.8 oz (101.1 kg)   Height: 5' 5\" (1.651 m)       Is someone accompanying this pt? NO    Is the patient using any DME equipment during OV? NO    Hearing/Vision:   No exam data present    Learning Assessment:     Learning Assessment 2018   PRIMARY LEARNER Patient   HIGHEST LEVEL OF EDUCATION - PRIMARY LEARNER  DID NOT GRADUATE HIGH SCHOOL   BARRIERS PRIMARY LEARNER NONE   CO-LEARNER CAREGIVER No   PRIMARY LANGUAGE ENGLISH   LEARNER PREFERENCE PRIMARY LISTENING   ANSWERED BY patient   RELATIONSHIP SELF     Depression Screening:     3 most recent PHQ Screens 2021   Little interest or pleasure in doing things Not at all   Feeling down, depressed, irritable, or hopeless Not at all   Total Score PHQ 2 0     Fall Risk Assessment:     Fall Risk Assessment, last 12 mths 3/2/2021   Able to walk? Yes   Fall in past 12 months? 0   Do you feel unsteady? 0   Are you worried about falling 0     Coordination of Care Questionaire:   1. Have you been to the ER, urgent care clinic since your last visit? Hospitalized since your last visit? NO    2. Have you seen or consulted any other health care providers outside of the [address] [place] [address] since your last visit? Include any pap smears or colon screening. YES neurologist     Advanced Directive:   1. Do you have an Advanced Directive? NO    2. Would you like information on Advanced Directives?  NO

## 2021-06-15 LAB
HCV AB SER IA-ACNC: 0.08 INDEX
HCV AB SERPL QL IA: NEGATIVE
HCV COMMENT,HCGAC: NORMAL

## 2021-06-21 DIAGNOSIS — E03.9 ACQUIRED HYPOTHYROIDISM: ICD-10-CM

## 2021-06-22 RX ORDER — LEVOTHYROXINE SODIUM 125 UG/1
125 TABLET ORAL
Qty: 90 TABLET | Refills: 1 | Status: SHIPPED | OUTPATIENT
Start: 2021-06-22 | End: 2021-10-19 | Stop reason: DRUGHIGH

## 2021-06-22 NOTE — TELEPHONE ENCOUNTER
[address] is requesting a refill on Levothyroxin 112mcg. Is she on this dose, because in our system she's to take 125mcg. No future appointments. Last appointment was 06/14/2021   Please assist, thank you.

## 2021-07-20 DIAGNOSIS — K21.9 GASTROESOPHAGEAL REFLUX DISEASE WITHOUT ESOPHAGITIS: ICD-10-CM

## 2021-07-20 RX ORDER — FAMOTIDINE 40 MG/1
TABLET, FILM COATED ORAL
Qty: 90 TABLET | Refills: 0 | Status: SHIPPED | OUTPATIENT
Start: 2021-07-20 | End: 2021-11-01

## 2021-09-13 ENCOUNTER — OFFICE VISIT (OUTPATIENT)
Dept: FAMILY MEDICINE CLINIC | Age: 51
End: 2021-09-13
Payer: MEDICAID

## 2021-09-13 VITALS
BODY MASS INDEX: 36.82 KG/M2 | DIASTOLIC BLOOD PRESSURE: 81 MMHG | WEIGHT: 221 LBS | HEART RATE: 85 BPM | RESPIRATION RATE: 16 BRPM | TEMPERATURE: 98.6 F | HEIGHT: 65 IN | OXYGEN SATURATION: 96 % | SYSTOLIC BLOOD PRESSURE: 115 MMHG

## 2021-09-13 DIAGNOSIS — K21.9 GASTROESOPHAGEAL REFLUX DISEASE WITHOUT ESOPHAGITIS: ICD-10-CM

## 2021-09-13 DIAGNOSIS — F17.210 SMOKES WITH GREATER THAN 40 PACK YEAR HISTORY: ICD-10-CM

## 2021-09-13 DIAGNOSIS — M79.7 FIBROMYALGIA: ICD-10-CM

## 2021-09-13 DIAGNOSIS — Z12.31 BREAST CANCER SCREENING BY MAMMOGRAM: Primary | ICD-10-CM

## 2021-09-13 DIAGNOSIS — E66.01 SEVERE OBESITY (HCC): ICD-10-CM

## 2021-09-13 DIAGNOSIS — E55.9 VITAMIN D DEFICIENCY: ICD-10-CM

## 2021-09-13 DIAGNOSIS — E03.9 ACQUIRED HYPOTHYROIDISM: ICD-10-CM

## 2021-09-13 DIAGNOSIS — J43.9 PULMONARY EMPHYSEMA, UNSPECIFIED EMPHYSEMA TYPE (HCC): ICD-10-CM

## 2021-09-13 DIAGNOSIS — Z13.31 POSITIVE DEPRESSION SCREENING: ICD-10-CM

## 2021-09-13 DIAGNOSIS — E78.5 HYPERLIPIDEMIA, UNSPECIFIED HYPERLIPIDEMIA TYPE: ICD-10-CM

## 2021-09-13 DIAGNOSIS — Z23 NEEDS FLU SHOT: ICD-10-CM

## 2021-09-13 DIAGNOSIS — F43.10 PTSD (POST-TRAUMATIC STRESS DISORDER): ICD-10-CM

## 2021-09-13 PROCEDURE — 90686 IIV4 VACC NO PRSV 0.5 ML IM: CPT | Performed by: LEGAL MEDICINE

## 2021-09-13 PROCEDURE — 90471 IMMUNIZATION ADMIN: CPT | Performed by: LEGAL MEDICINE

## 2021-09-13 PROCEDURE — 99214 OFFICE O/P EST MOD 30 MIN: CPT | Performed by: LEGAL MEDICINE

## 2021-09-13 RX ORDER — CHLORPROMAZINE HYDROCHLORIDE 200 MG/1
200 TABLET, FILM COATED ORAL
COMMUNITY
Start: 2021-09-02 | End: 2022-06-20

## 2021-09-13 RX ORDER — GABAPENTIN 300 MG/1
300 CAPSULE ORAL 2 TIMES DAILY
COMMUNITY
Start: 2021-09-01

## 2021-09-13 RX ORDER — VORTIOXETINE 10 MG/1
TABLET, FILM COATED ORAL
COMMUNITY
Start: 2021-09-01

## 2021-09-13 RX ORDER — PROPRANOLOL HYDROCHLORIDE 20 MG/1
TABLET ORAL
COMMUNITY
Start: 2021-08-19 | End: 2022-06-20

## 2021-09-13 RX ORDER — ALPRAZOLAM 1 MG/1
TABLET ORAL
COMMUNITY
Start: 2021-08-18

## 2021-09-13 NOTE — PROGRESS NOTES
Evan Sirena     Chief Complaint   Patient presents with    Medication Management     follow up    Other     Mammogram at [facility] Immunization/Injection     Flu shot     Vitals:    09/13/21 0918   BP: 115/81   Pulse: 85   Resp: 16   Temp: 98.6 °F (37 °C)   TempSrc: Temporal   SpO2: 96%   Weight: 221 lb (100.2 kg)   Height: 5' 5\" (1.651 m)         HPI: patient is  here for follow   Has been coughing with phlegm and wheezing it is on and off she is adherent to all her inhalers    Smoked  Since the age of 11 , 2 packs a day  For 10 years , 23 years 1 Pack and  Last 2 years less that 1/2 pack   Overall pack year is over 36    She is currently due for mammogram screening  Patient will get flu vaccine today    Patient had a previous CT scan done below in the results  She will need follow-up on emphysema findings as well as pulmonary nodule      IMPRESSION  IMPRESSION:  1. Mild emphysema, with scattered pulmonary nodules, largest measuring 4 mm. Please see guidelines below for recommendations on follow-up imaging. 2. Asymmetric nodularity of the right breast, correlate with recent breast  imaging.     ========     Fleischner Society Pulmonary Nodule Guidelines (revised 2017):     Solid nodule <6 mm:  -Low risk for lung cancer: No routine followup.  -High risk for lung cancer: Optional CT in 12 months.           Past Medical History:   Diagnosis Date    Breast cyst     Depression     Emphysema lung ([address]     1 year and a half it was mentioned by ER Doc.     Fibromyalgia     Hyperlipidemia     Hypothyroidism     Ovarian cyst      Past Surgical History:   Procedure Laterality Date    HX APPENDECTOMY      HX GYN      Cyst, Uterine Ablation    HX TUBAL LIGATION      IMPLANT BREAST SILICONE/EQ Bilateral     Approximately 1999    LA BREAST SURGERY PROCEDURE UNLISTED       Social History     Tobacco Use    Smoking status: Current Every Day Smoker     Packs/day: 0.50     Types: Cigarettes    Smokeless tobacco: Current User    Tobacco comment: patient stated she quit three weeks ago cig and now uses vape   Substance Use Topics    Alcohol use: Yes     Comment: occ       Family History   Problem Relation Age of Onset    Heart Disease Mother     Dementia Mother     Heart Disease Father     Heart Disease Sister     MS Sister     Heart Disease Brother         3 heart attacks    Cancer Paternal Aunt         breast    Breast Cancer Maternal Aunt     Diabetes Neg Hx     Hypertension Neg Hx        Review of Systems   Constitutional: Negative for chills, fever, malaise/fatigue and weight loss. HENT: Negative for congestion, ear discharge, ear pain, hearing loss, nosebleeds, sinus pain and sore throat. Eyes: Negative for blurred vision, double vision and discharge. Respiratory: Positive for cough and wheezing. Cardiovascular: Negative for chest pain, palpitations, claudication and leg swelling. Gastrointestinal: Negative for abdominal pain, constipation, diarrhea, nausea and vomiting. Genitourinary: Negative for dysuria, frequency and urgency. Musculoskeletal: Negative for myalgias. Skin: Negative for itching and rash. Neurological: Negative for dizziness, tingling, sensory change, speech change, focal weakness, weakness and headaches. Psychiatric/Behavioral: Negative for depression and suicidal ideas. Physical Exam     Assessment and plan     Plan of care has been discussed with the patient, he agrees to the plan and verbalized understanding. All his questions were answered  More than 50% of the time spent in this visit was counseling the patient about  illness and treatment options         1. Breast cancer screening by mammogram    - [facility] MAMMO BI SCREENING INCL CAD; Future    2. Needs flu shot  Flu vaccine was given with no complication  - INFLUENZA VIRUS VAC QUAD,SPLIT,PRESV FREE SYRINGE IM    3. Gastroesophageal reflux disease without esophagitis  Stable on current medications    4. Acquired hypothyroidism  She is adherent to levothyroxine with normal levels    5. Vitamin D deficiency  Improved vitamin D level continue supplements    6. Fibromyalgia    Patient requesting referral for rheumatologist for reevaluation  - REFERRAL TO RHEUMATOLOGY    7. Severe obesity ([address]  Lifestyle modification has been discussed with patient in details, decrease carbohydrates, decrease or eliminate sugar intake, gradually increase physical activity as tolerated to be about 4 to 5 hours a week. 8. PTSD (post-traumatic stress disorder)  She is following up with psychiatrist    9. Hyperlipidemia, unspecified hyperlipidemia type      10. Smokes with greater than 40 pack year history  Strongly encouraged patient to quit smoking  -     11. Lung nodule < 6cm on CT  Follow-up on lung nodule  - CT CHEST WO CONT; Future  - REFERRAL TO PULMONARY DISEASE    12. Pulmonary emphysema, unspecified emphysema type (HCC)  Further evaluation for emphyse      - CT CHEST WO CONT; Future  - REFERRAL TO PULMONARY DISEASE    Current Outpatient Medications   Medication Sig Dispense Refill    famotidine (PEPCID) 40 mg tablet Take 1 tablet by mouth once daily 90 Tablet 0    levothyroxine (SYNTHROID) 125 mcg tablet Take 1 Tablet by mouth Daily (before breakfast) for 90 days. 90 Tablet 1    ergocalciferol (ERGOCALCIFEROL) 1,250 mcg (50,000 unit) capsule Take 1 Capsule by mouth every seven (7) days for 90 days. 12 Capsule 3    atorvastatin (LIPITOR) 40 mg tablet Take 1 tablet by mouth once daily 90 Tab 3    zolpidem CR (AMBIEN CR) 12.5 mg tablet Take 12.5 mg by mouth nightly.  amitriptyline (ELAVIL) 100 mg tablet Take 200 mg by mouth nightly.       albuterol (PROVENTIL HFA, VENTOLIN HFA, PROAIR HFA) 90 mcg/actuation inhaler Albuterol inhaler, 2 puffs up to every 4 hours if needed for wheezing, cough, shortness of breath 1 Inhaler 11    fluticasone propionate (FLOVENT HFA) 110 mcg/actuation inhaler Take 2 Puffs by inhalation every twelve (12) hours. 1 Inhaler 11    acetaminophen (TYLENOL EXTRA STRENGTH) 500 mg tablet Take  by mouth every six (6) hours as needed for Pain.  DULoxetine (CYMBALTA) 60 mg capsule Take 60 mg by mouth daily.  ARIPiprazole (ABILIFY) 10 mg tablet Take 10 mg by mouth daily.  gabapentin (NEURONTIN) 300 mg capsule Take 300 mg by mouth two (2) times a day.  propranoloL (INDERAL) 20 mg tablet TAKE 1 TABLET BY MOUTH TWICE DAILY AS NEEDED FOR ANXIETY      chlorproMAZINE (THORAZINE) 200 mg tablet Take 200 mg by mouth nightly.  ALPRAZolam (XANAX) 1 mg tablet TAKE 2 & 1 2 (TWO & ONE HALF) TABLETS BY MOUTH ONCE DAILY AS NEEDED FOR ANXIETY      Trintellix 10 mg tablet TAKE 3 TABLETS BY MOUTH ONCE DAILY         Patient Active Problem List    Diagnosis Date Noted    Palpitations 10/27/2020    Nephrolithiasis 10/27/2020    GANT (dyspnea on exertion) 10/27/2020    Severe obesity ([address] 08/07/2020    ISA (generalized anxiety disorder) 05/12/2020    PTSD (post-traumatic stress disorder) 05/12/2020    Panic attack 05/12/2020    Chronic fatigue 05/12/2020    Vitamin D deficiency 03/30/2019    Current moderate episode of major depressive disorder ([address] 12/06/2018    Hyperlipidemia 12/06/2018    Acquired hypothyroidism 12/06/2018    Fibromyalgia 12/06/2018    Obesity (BMI 30-39.9) 12/06/2018    Tobacco use 12/06/2018    Gastroesophageal reflux disease 12/06/2018    Lymphocytic colitis 12/06/2018     Results for orders placed or performed during the hospital encounter of 06/14/21   HEMOGLOBIN A1C WITH EAG   Result Value Ref Range    Hemoglobin A1c 5.6 4.2 - 5.6 %    Est. average glucose 114 mg/dL   VITAMIN D, 25 HYDROXY   Result Value Ref Range    Vitamin D 25-Hydroxy 30.3 30 - 100 ng/mL   HEPATITIS C AB   Result Value Ref Range    Hepatitis C virus Ab 0.08 <0.80 Index    Hep C virus Ab Interp.  Negative NEG      Hep C  virus Ab comment         METABOLIC PANEL, COMPREHENSIVE   Result Value Ref Range    Sodium 140 136 - 145 mmol/L    Potassium 4.6 3.5 - 5.5 mmol/L    Chloride 106 100 - 111 mmol/L    CO2 30 21 - 32 mmol/L    Anion gap 4 3.0 - 18 mmol/L    Glucose 118 (H) 74 - 99 mg/dL    BUN 17 7.0 - 18 MG/DL    Creatinine 0.99 0.6 - 1.3 MG/DL    BUN/Creatinine ratio 17 12 - 20      GFR est AA >60 >60 ml/min/1.73m2    GFR est non-AA 59 (L) >60 ml/min/1.73m2    Calcium 9.1 8.5 - 10.1 MG/DL    Bilirubin, total 0.6 0.2 - 1.0 MG/DL    ALT (SGPT) 43 13 - 56 U/L    AST (SGOT) 21 10 - 38 U/L    Alk. phosphatase 84 45 - 117 U/L    Protein, total 7.1 6.4 - 8.2 g/dL    Albumin 4.0 3.4 - 5.0 g/dL    Globulin 3.1 2.0 - 4.0 g/dL    A-G Ratio 1.3 0.8 - 1.7     TSH AND FREE T4   Result Value Ref Range    TSH 3.25 0.36 - 3.74 uIU/mL    T4, Free 0.9 0.7 - 1.5 NG/DL     Hospital Outpatient Visit on 06/14/2021   Component Date Value Ref Range Status    Hemoglobin A1c 06/14/2021 5.6  4.2 - 5.6 % Final    Comment: (NOTE)  HbA1C Interpretive Ranges  <5.7              Normal  5.7 - 6.4         Consider Prediabetes  >6.5              Consider Diabetes      Est. average glucose 06/14/2021 114  mg/dL Final    Comment: (NOTE)  The eAG should be interpreted with patient characteristics in mind   since ethnicity, interindividual differences, red cell lifespan,   variation in rates of glycation, etc. may affect the validity of the   calculation.  Vitamin D 25-Hydroxy 06/14/2021 30.3  30 - 100 ng/mL Final    Comment: (NOTE)  Deficiency               <20 ng/mL  Insufficiency          20-30 ng/mL  Sufficient             ng/mL  Possible toxicity       >100 ng/mL    The Method used is Siemens Advia Centaur currently standardized to a   Center of Disease Control and Prevention (CDC) certified reference   [address]. Samples containing fluorescein dye can produce falsely   elevated values when tested with the ADVIA Centaur Vitamin D Assay.    It is recommended that results in the toxic range, >100 ng/mL, be   retested 72 hours post fluorescein exposure.  Hepatitis C virus Ab 06/14/2021 0.08  <0.80 Index Final    Hep C virus Ab Interp. 06/14/2021 Negative  NEG   Final    Hep C  virus Ab comment 06/14/2021        Final    Comment: Index <0.80. ......................... Reij Brittle Negative  Index > or = to 0.80 and <1.00. .... Reji Brittle Reji Brittle Equivocal  Index >1.00. ......................... Reji Brittle Positive          For Equivocal or Positive results, confirmation with Hepatitis C RNA by PCR or bDNA is suggested.  Sodium 06/14/2021 140  136 - 145 mmol/L Final    Potassium 06/14/2021 4.6  3.5 - 5.5 mmol/L Final    Chloride 06/14/2021 106  100 - 111 mmol/L Final    CO2 06/14/2021 30  21 - 32 mmol/L Final    Anion gap 06/14/2021 4  3.0 - 18 mmol/L Final    Glucose 06/14/2021 118* 74 - 99 mg/dL Final    BUN 06/14/2021 17  7.0 - 18 MG/DL Final    Creatinine 06/14/2021 0.99  0.6 - 1.3 MG/DL Final    BUN/Creatinine ratio 06/14/2021 17  12 - 20   Final    GFR est AA 06/14/2021 >60  >60 ml/min/1.73m2 Final    GFR est non-AA 06/14/2021 59* >60 ml/min/1.73m2 Final    Comment: (NOTE)  Estimated GFR is calculated using the Modification of Diet in Renal   Disease (MDRD) Study equation, reported for both  Americans   (GFRAA) and non- Americans (GFRNA), and normalized to 1.73m2   body surface area. The physician must decide which value applies to   the patient. The MDRD study equation should only be used in   individuals age 25 or older. It has not been validated for the   following: pregnant women, patients with serious comorbid conditions,   or on certain medications, or persons with extremes of body size,   muscle mass, or nutritional status.  Calcium 06/14/2021 9.1  8.5 - 10.1 MG/DL Final    Bilirubin, total 06/14/2021 0.6  0.2 - 1.0 MG/DL Final    ALT (SGPT) 06/14/2021 43  13 - 56 U/L Final    AST (SGOT) 06/14/2021 21  10 - 38 U/L Final    Alk.  phosphatase 06/14/2021 84  45 - 117 U/L Final    Protein, total 06/14/2021 7.1  6.4 - 8.2 g/dL Final    Albumin 06/14/2021 4.0  3.4 - 5.0 g/dL Final    Globulin 06/14/2021 3.1  2.0 - 4.0 g/dL Final    A-G Ratio 06/14/2021 1.3  0.8 - 1.7   Final    TSH 06/14/2021 3.25  0.36 - 3.74 uIU/mL Final    T4, Free 06/14/2021 0.9  0.7 - 1.5 NG/DL Final          Follow-up and Dispositions    · Return in about 3 months (around 12/13/2021) for for annual physical.           Depression screen positive, PHQ 9 Score: 12, C-SSRS completed.

## 2021-09-13 NOTE — PROGRESS NOTES
Abiodun Steiner is a 44 y.o. female (: 1970) presenting to address:    Chief Complaint   Patient presents with    Medication Management     follow up    Other     Mammogram at [facility] Immunization/Injection     Flu shot       Vitals:    21 0918   BP: 115/81   Pulse: 85   Resp: 16   Temp: 98.6 °F (37 °C)   TempSrc: Temporal   SpO2: 96%   Weight: 221 lb (100.2 kg)   Height: 5' 5\" (1.651 m)       Hearing/Vision:   No exam data present    Learning Assessment:     Learning Assessment 2018   PRIMARY LEARNER Patient   HIGHEST LEVEL OF EDUCATION - PRIMARY LEARNER  DID NOT GRADUATE HIGH SCHOOL   BARRIERS PRIMARY LEARNER NONE   CO-LEARNER CAREGIVER No   PRIMARY LANGUAGE ENGLISH   LEARNER PREFERENCE PRIMARY LISTENING   ANSWERED BY patient   RELATIONSHIP SELF     Depression Screening:     3 most recent PHQ Screens 2021   Little interest or pleasure in doing things Not at all   Feeling down, depressed, irritable, or hopeless Not at all   Total Score PHQ 2 0     Fall Risk Assessment:     Fall Risk Assessment, last 12 mths 2021   Able to walk? Yes   Fall in past 12 months? 0   Do you feel unsteady? -   Are you worried about falling -     Abuse Screening:     Abuse Screening Questionnaire 2021   Do you ever feel afraid of your partner? N   Are you in a relationship with someone who physically or mentally threatens you? N   Is it safe for you to go home? Y     Coordination of Care Questionaire:   1. Have you been to the ER, urgent care clinic since your last visit? Hospitalized since your last visit? NO    2. Have you seen or consulted any other health care providers outside of the [address] [place] [address] since your last visit? Include any pap smears or colon screening. NO    Advanced Directive:   1. Do you have an Advanced Directive? NO    2. Would you like information on Advanced Directives?  NO

## 2021-09-13 NOTE — PROGRESS NOTES
Immunization of the high dose flu vaccine was administered 9/13/2021 by Marla Mccollum LPN. Patient tolerated procedure well. No reactions noted.

## 2021-09-22 ENCOUNTER — TELEPHONE (OUTPATIENT)
Dept: FAMILY MEDICINE CLINIC | Age: 51
End: 2021-09-22

## 2021-09-22 NOTE — TELEPHONE ENCOUNTER
CT scan result is available in care everywhere    Is showing emphysema    And small nodules    Patient will follow up with pulmonologist please call and schedule appointment    Referral, OV Notes, LABS, and Insurance information faxed to:  [facility]  Phone: [phone]  Fax: [phone]

## 2021-09-22 NOTE — TELEPHONE ENCOUNTER
Pt had her CT done at [facility] on 9/20/2021 and she wanted to know if we had received the results. Please advise.

## 2021-09-23 NOTE — TELEPHONE ENCOUNTER
Informed pt of CT results and treatment plan of scheduling w/pulmonary; pt is scheduled for next month.

## 2021-10-19 ENCOUNTER — OFFICE VISIT (OUTPATIENT)
Dept: FAMILY MEDICINE CLINIC | Age: 51
End: 2021-10-19
Payer: MEDICAID

## 2021-10-19 VITALS — TEMPERATURE: 97.3 F | BODY MASS INDEX: 36.85 KG/M2 | RESPIRATION RATE: 16 BRPM | HEIGHT: 65 IN | WEIGHT: 221.2 LBS

## 2021-10-19 DIAGNOSIS — Z00.00 ANNUAL PHYSICAL EXAM: Primary | ICD-10-CM

## 2021-10-19 DIAGNOSIS — M79.7 FIBROMYALGIA: ICD-10-CM

## 2021-10-19 DIAGNOSIS — Z72.0 TOBACCO USE: ICD-10-CM

## 2021-10-19 DIAGNOSIS — E55.9 VITAMIN D DEFICIENCY: ICD-10-CM

## 2021-10-19 DIAGNOSIS — E03.9 ACQUIRED HYPOTHYROIDISM: ICD-10-CM

## 2021-10-19 DIAGNOSIS — K21.9 GASTROESOPHAGEAL REFLUX DISEASE WITHOUT ESOPHAGITIS: ICD-10-CM

## 2021-10-19 PROCEDURE — 99396 PREV VISIT EST AGE 40-64: CPT | Performed by: LEGAL MEDICINE

## 2021-10-19 RX ORDER — OMEPRAZOLE 20 MG/1
20 CAPSULE, DELAYED RELEASE ORAL
COMMUNITY
End: 2022-03-30

## 2021-10-19 RX ORDER — ERGOCALCIFEROL 1.25 MG/1
50000 CAPSULE ORAL
Qty: 12 CAPSULE | Refills: 3 | Status: SHIPPED | OUTPATIENT
Start: 2021-10-19 | End: 2022-06-20

## 2021-10-19 RX ORDER — LEVOTHYROXINE SODIUM 137 UG/1
137 TABLET ORAL DAILY
COMMUNITY
Start: 2021-07-22 | End: 2022-07-25 | Stop reason: SDUPTHER

## 2021-10-19 NOTE — PROGRESS NOTES
Rossana Lemus     Chief Complaint   Patient presents with    Physical     Vitals:    10/19/21 1005   Resp: 16   Temp: 97.3 °F (36.3 °C)   TempSrc: Temporal   Weight: 221 lb 3.2 oz (100.3 kg)   Height: 5' 5\" (1.651 m)   PainSc:   6   PainLoc: Generalized         HPI: Patient is here for CPE   And she has been stable with no acute changes    Following up with psychiatrist Dr. Ant Verduzco medication has been reviewed and dose having been updated, she still having difficult time to stay asleep. Patient is doing very well on trying to quit smoking now she is down to 3 cigarettes    She has appointment with pulmonologist early next month  She is due for shingles shot she will get it at the pharmacy    She is up-to-date on her health maintenance    Past Medical History:   Diagnosis Date    Breast cyst     Depression     Emphysema lung ([facility] [address]     1 year and a half it was mentioned by ER Doc.     Fibromyalgia     Hyperlipidemia     Hypothyroidism     Ovarian cyst      Past Surgical History:   Procedure Laterality Date    HX APPENDECTOMY      HX GYN      Cyst, Uterine Ablation    HX TUBAL LIGATION      IMPLANT BREAST SILICONE/EQ Bilateral     Approximately 1999    LA BREAST SURGERY PROCEDURE UNLISTED       Social History     Tobacco Use    Smoking status: Current Every Day Smoker     Packs/day: 0.50     Types: Cigarettes    Smokeless tobacco: Current User    Tobacco comment: patient stated she quit three weeks ago cig and now uses vape   Substance Use Topics    Alcohol use: Yes     Comment: occ       Family History   Problem Relation Age of Onset    Heart Disease Mother     Dementia Mother     Heart Disease Father     Heart Disease Sister     MS Sister     Heart Disease Brother         3 heart attacks    Cancer Paternal Aunt         breast    Breast Cancer Maternal Aunt     Diabetes Neg Hx     Hypertension Neg Hx        Review of Systems   Constitutional: Negative for chills, diaphoresis, fever, malaise/fatigue and weight loss. HENT: Negative for congestion, ear discharge, ear pain, hearing loss, nosebleeds, sinus pain and sore throat. Eyes: Negative for blurred vision, double vision and discharge. Respiratory: Positive for shortness of breath and wheezing. Negative for cough, hemoptysis, sputum production and stridor. Cardiovascular: Negative for chest pain, palpitations, orthopnea, claudication, leg swelling and PND. Gastrointestinal: Negative for abdominal pain, constipation, diarrhea, nausea and vomiting. Genitourinary: Negative for dysuria, flank pain, frequency, hematuria and urgency. Musculoskeletal: Positive for joint pain and neck pain. Negative for back pain, falls and myalgias. Skin: Negative for itching and rash. Neurological: Negative for dizziness, tingling, sensory change, speech change, focal weakness, seizures, loss of consciousness, weakness and headaches. Psychiatric/Behavioral: Negative for depression, hallucinations, substance abuse and suicidal ideas. The patient has insomnia. The patient is not nervous/anxious. Physical Exam  Vitals and nursing note reviewed. Constitutional:       General: She is not in acute distress. Appearance: She is well-developed. She is not diaphoretic. HENT:      Head: Normocephalic and atraumatic. Right Ear: Tympanic membrane, ear canal and external ear normal. There is no impacted cerumen. Left Ear: Tympanic membrane and external ear normal. There is no impacted cerumen. Nose: Congestion present. Mouth/Throat:      Pharynx: Oropharynx is clear. No oropharyngeal exudate. Eyes:      General: No scleral icterus. Right eye: No discharge. Left eye: No discharge. Conjunctiva/sclera: Conjunctivae normal.      Pupils: Pupils are equal, round, and reactive to light. Neck:      Thyroid: No thyromegaly. Cardiovascular:      Rate and Rhythm: Normal rate and regular rhythm.       Heart sounds: Normal heart sounds. No murmur heard. Pulmonary:      Effort: Pulmonary effort is normal. No respiratory distress. Breath sounds: Normal breath sounds. No wheezing, rhonchi or rales. Chest:      Chest wall: No tenderness. Abdominal:      General: There is no distension. Palpations: Abdomen is soft. There is no mass. Tenderness: There is no abdominal tenderness. There is no right CVA tenderness, left CVA tenderness, guarding or rebound. Hernia: No hernia is present. Musculoskeletal:         General: No swelling, tenderness or deformity. Normal range of motion. Right lower leg: No edema. Left lower leg: No edema. Lymphadenopathy:      Cervical: No cervical adenopathy. Skin:     General: Skin is warm and dry. Coloration: Skin is not pale. Findings: No erythema or rash. Neurological:      General: No focal deficit present. Mental Status: She is alert and oriented to person, place, and time. Cranial Nerves: No cranial nerve deficit. Motor: No weakness. Coordination: Coordination normal.      Gait: Gait normal.   Psychiatric:         Behavior: Behavior normal.         Thought Content: Thought content normal.         Judgment: Judgment normal.          Assessment and plan     Plan of care has been discussed with the patient, he agrees to the plan and verbalized understanding. All his questions were answered  More than 50% of the time spent in this visit was counseling the patient about  illness and treatment options         1. Vitamin D deficiency  She is on weekly vitamin D supplements  - ergocalciferol (ERGOCALCIFEROL) 1,250 mcg (50,000 unit) capsule; Take 1 Capsule by mouth every seven (7) days for 90 days. Dispense: 12 Capsule; Refill: 3    2. Annual physical exam    - CBC WITH AUTOMATED DIFF; Future  - LIPID PANEL; Future  - METABOLIC PANEL, COMPREHENSIVE; Future    3.  Gastroesophageal reflux disease without esophagitis  Currently stable on pantoprazole and famotidine    4. Acquired hypothyroidism  Last time thyroid function was normal 5 months ago she is on 137 of levothyroxine  - TSH AND FREE T4; Future    5. Fibromyalgia  \Is stable on duloxetine and gabapentin she is following up with rheumatologist    6. Tobacco use  She is doing very well she is down to 3 cigarettes a day and using nicotine patches    Current Outpatient Medications   Medication Sig Dispense Refill    Euthyrox 137 mcg tablet Take 137 mcg by mouth daily.  ergocalciferol (ERGOCALCIFEROL) 1,250 mcg (50,000 unit) capsule Take 1 Capsule by mouth every seven (7) days for 90 days. 12 Capsule 3    omeprazole (PRILOSEC) 20 mg capsule Take 20 mg by mouth.  gabapentin (NEURONTIN) 300 mg capsule Take 300 mg by mouth two (2) times a day. Will be prescribed by rheumatology, Dr. Marlon Howell.  propranoloL (INDERAL) 20 mg tablet TAKE 1 TABLET BY MOUTH TWICE DAILY AS NEEDED FOR ANXIETY      chlorproMAZINE (THORAZINE) 200 mg tablet Take 200 mg by mouth nightly.  ALPRAZolam (XANAX) 1 mg tablet TAKE 2 & 1 2 (TWO & ONE HALF) TABLETS BY MOUTH ONCE DAILY AS NEEDED FOR ANXIETY      Trintellix 10 mg tablet TAKE 3 TABLETS BY MOUTH ONCE DAILY      famotidine (PEPCID) 40 mg tablet Take 1 tablet by mouth once daily 90 Tablet 0    atorvastatin (LIPITOR) 40 mg tablet Take 1 tablet by mouth once daily 90 Tab 3    zolpidem CR (AMBIEN CR) 12.5 mg tablet Take 12.5 mg by mouth nightly.  amitriptyline (ELAVIL) 100 mg tablet Take 200 mg by mouth nightly.  albuterol (PROVENTIL HFA, VENTOLIN HFA, PROAIR HFA) 90 mcg/actuation inhaler Albuterol inhaler, 2 puffs up to every 4 hours if needed for wheezing, cough, shortness of breath 1 Inhaler 11    fluticasone propionate (FLOVENT HFA) 110 mcg/actuation inhaler Take 2 Puffs by inhalation every twelve (12) hours.  1 Inhaler 11    acetaminophen (TYLENOL EXTRA STRENGTH) 500 mg tablet Take  by mouth every six (6) hours as needed for Pain.      DULoxetine (CYMBALTA) 60 mg capsule Take 60 mg by mouth daily. Will be prescribed by rheumatology, Dr. Daniella Barbosa.  ARIPiprazole (ABILIFY) 10 mg tablet Take 10 mg by mouth daily. Patient Active Problem List    Diagnosis Date Noted    Palpitations 10/27/2020    Nephrolithiasis 10/27/2020    GANT (dyspnea on exertion) 10/27/2020    Severe obesity ([address] 08/07/2020    ISA (generalized anxiety disorder) 05/12/2020    PTSD (post-traumatic stress disorder) 05/12/2020    Panic attack 05/12/2020    Chronic fatigue 05/12/2020    Vitamin D deficiency 03/30/2019    Current moderate episode of major depressive disorder ([facility] [address] 12/06/2018    Hyperlipidemia 12/06/2018    Acquired hypothyroidism 12/06/2018    Fibromyalgia 12/06/2018    Obesity (BMI 30-39.9) 12/06/2018    Tobacco use 12/06/2018    Gastroesophageal reflux disease 12/06/2018    Lymphocytic colitis 12/06/2018     Results for orders placed or performed during the hospital encounter of 06/14/21   HEMOGLOBIN A1C WITH EAG   Result Value Ref Range    Hemoglobin A1c 5.6 4.2 - 5.6 %    Est. average glucose 114 mg/dL   VITAMIN D, 25 HYDROXY   Result Value Ref Range    Vitamin D 25-Hydroxy 30.3 30 - 100 ng/mL   HEPATITIS C AB   Result Value Ref Range    Hepatitis C virus Ab 0.08 <0.80 Index    Hep C virus Ab Interp. Negative NEG      Hep C  virus Ab comment         METABOLIC PANEL, COMPREHENSIVE   Result Value Ref Range    Sodium 140 136 - 145 mmol/L    Potassium 4.6 3.5 - 5.5 mmol/L    Chloride 106 100 - 111 mmol/L    CO2 30 21 - 32 mmol/L    Anion gap 4 3.0 - 18 mmol/L    Glucose 118 (H) 74 - 99 mg/dL    BUN 17 7.0 - 18 MG/DL    Creatinine 0.99 0.6 - 1.3 MG/DL    BUN/Creatinine ratio 17 12 - 20      GFR est AA >60 >60 ml/min/1.73m2    GFR est non-AA 59 (L) >60 ml/min/1.73m2    Calcium 9.1 8.5 - 10.1 MG/DL    Bilirubin, total 0.6 0.2 - 1.0 MG/DL    ALT (SGPT) 43 13 - 56 U/L    AST (SGOT) 21 10 - 38 U/L    Alk.  phosphatase 84 45 - 117 U/L Protein, total 7.1 6.4 - 8.2 g/dL    Albumin 4.0 3.4 - 5.0 g/dL    Globulin 3.1 2.0 - 4.0 g/dL    A-G Ratio 1.3 0.8 - 1.7     TSH AND FREE T4   Result Value Ref Range    TSH 3.25 0.36 - 3.74 uIU/mL    T4, Free 0.9 0.7 - 1.5 NG/DL     No visits with results within 3 Month(s) from this visit. Latest known visit with results is:   Hospital Outpatient Visit on 06/14/2021   Component Date Value Ref Range Status    Hemoglobin A1c 06/14/2021 5.6  4.2 - 5.6 % Final    Comment: (NOTE)  HbA1C Interpretive Ranges  <5.7              Normal  5.7 - 6.4         Consider Prediabetes  >6.5              Consider Diabetes      Est. average glucose 06/14/2021 114  mg/dL Final    Comment: (NOTE)  The eAG should be interpreted with patient characteristics in mind   since ethnicity, interindividual differences, red cell lifespan,   variation in rates of glycation, etc. may affect the validity of the   calculation.  Vitamin D 25-Hydroxy 06/14/2021 30.3  30 - 100 ng/mL Final    Comment: (NOTE)  Deficiency               <20 ng/mL  Insufficiency          20-30 ng/mL  Sufficient             ng/mL  Possible toxicity       >100 ng/mL    The Method used is Siemens Advia Centaur currently standardized to a   Center of Disease Control and Prevention (CDC) certified reference   [address]. Samples containing fluorescein dye can produce falsely   elevated values when tested with the ADVIA Centaur Vitamin D Assay. It is recommended that results in the toxic range, >100 ng/mL, be   retested 72 hours post fluorescein exposure.  Hepatitis C virus Ab 06/14/2021 0.08  <0.80 Index Final    Hep C virus Ab Interp. 06/14/2021 Negative  NEG   Final    Hep C  virus Ab comment 06/14/2021        Final    Comment: Index <0.80. ......................... Edmundo Confer Negative  Index > or = to 0.80 and <1.00. .... Edmundo Confer Edmundo Confer Equivocal  Index >1.00. ......................... Edmundo Confer Positive          For Equivocal or Positive results, confirmation with Hepatitis C RNA by PCR or bDNA is suggested.  Sodium 06/14/2021 140  136 - 145 mmol/L Final    Potassium 06/14/2021 4.6  3.5 - 5.5 mmol/L Final    Chloride 06/14/2021 106  100 - 111 mmol/L Final    CO2 06/14/2021 30  21 - 32 mmol/L Final    Anion gap 06/14/2021 4  3.0 - 18 mmol/L Final    Glucose 06/14/2021 118* 74 - 99 mg/dL Final    BUN 06/14/2021 17  7.0 - 18 MG/DL Final    Creatinine 06/14/2021 0.99  0.6 - 1.3 MG/DL Final    BUN/Creatinine ratio 06/14/2021 17  12 - 20   Final    GFR est AA 06/14/2021 >60  >60 ml/min/1.73m2 Final    GFR est non-AA 06/14/2021 59* >60 ml/min/1.73m2 Final    Comment: (NOTE)  Estimated GFR is calculated using the Modification of Diet in Renal   Disease (MDRD) Study equation, reported for both  Americans   (GFRAA) and non- Americans (GFRNA), and normalized to 1.73m2   body surface area. The physician must decide which value applies to   the patient. The MDRD study equation should only be used in   individuals age 25 or older. It has not been validated for the   following: pregnant women, patients with serious comorbid conditions,   or on certain medications, or persons with extremes of body size,   muscle mass, or nutritional status.  Calcium 06/14/2021 9.1  8.5 - 10.1 MG/DL Final    Bilirubin, total 06/14/2021 0.6  0.2 - 1.0 MG/DL Final    ALT (SGPT) 06/14/2021 43  13 - 56 U/L Final    AST (SGOT) 06/14/2021 21  10 - 38 U/L Final    Alk. phosphatase 06/14/2021 84  45 - 117 U/L Final    Protein, total 06/14/2021 7.1  6.4 - 8.2 g/dL Final    Albumin 06/14/2021 4.0  3.4 - 5.0 g/dL Final    Globulin 06/14/2021 3.1  2.0 - 4.0 g/dL Final    A-G Ratio 06/14/2021 1.3  0.8 - 1.7   Final    TSH 06/14/2021 3.25  0.36 - 3.74 uIU/mL Final    T4, Free 06/14/2021 0.9  0.7 - 1.5 NG/DL Final          Follow-up and Dispositions    · Return in about 6 months (around 4/19/2022).

## 2021-10-19 NOTE — PROGRESS NOTES
Stephanie Verduzco is a 44 y.o. female (: 1970) presenting to address:    Chief Complaint   Patient presents with    Physical       Vitals:    10/19/21 1005   Resp: 16   Temp: 97.3 °F (36.3 °C)   TempSrc: Temporal   Weight: 221 lb 3.2 oz (100.3 kg)   Height: 5' 5\" (1.651 m)   PainSc:   6   PainLoc: Generalized       Hearing/Vision:   No exam data present    Learning Assessment:     Learning Assessment 2018   PRIMARY LEARNER Patient   HIGHEST LEVEL OF EDUCATION - PRIMARY LEARNER  DID NOT GRADUATE HIGH SCHOOL   BARRIERS PRIMARY LEARNER NONE   CO-LEARNER CAREGIVER No   PRIMARY LANGUAGE ENGLISH   LEARNER PREFERENCE PRIMARY LISTENING   ANSWERED BY patient   RELATIONSHIP SELF     Depression Screening:     3 most recent PHQ Screens 2021   Little interest or pleasure in doing things Several days   Feeling down, depressed, irritable, or hopeless Several days   Total Score PHQ 2 2   Trouble falling or staying asleep, or sleeping too much Several days   Feeling tired or having little energy More than half the days   Poor appetite, weight loss, or overeating Several days   Feeling bad about yourself - or that you are a failure or have let yourself or your family down More than half the days   Trouble concentrating on things such as school, work, reading, or watching TV More than half the days   Moving or speaking so slowly that other people could have noticed; or the opposite being so fidgety that others notice More than half the days   Thoughts of being better off dead, or hurting yourself in some way Not at all   PHQ 9 Score 12   How difficult have these problems made it for you to do your work, take care of your home and get along with others Very difficult     Fall Risk Assessment:     Fall Risk Assessment, last 12 mths 2021   Able to walk?  Yes   Fall in past 12 months? 0   Do you feel unsteady? -   Are you worried about falling -     Abuse Screening:     Abuse Screening Questionnaire 2021   Do you ever feel afraid of your partner? N   Are you in a relationship with someone who physically or mentally threatens you? N   Is it safe for you to go home? Y     Coordination of Care Questionaire:   1. Have you been to the ER, urgent care clinic since your last visit? Hospitalized since your last visit? NO    2. Have you seen or consulted any other health care providers outside of the [address] [place] [address] since your last visit? Include any pap smears or colon screening. YES, had mammogram completed; rheumatology follow up    Advanced Directive:   1. Do you have an Advanced Directive? NO    2. Would you like information on Advanced Directives?  NO

## 2021-10-26 ENCOUNTER — APPOINTMENT (OUTPATIENT)
Dept: FAMILY MEDICINE CLINIC | Age: 51
End: 2021-10-26

## 2021-10-26 ENCOUNTER — HOSPITAL ENCOUNTER (OUTPATIENT)
Dept: LAB | Age: 51
Discharge: HOME | End: 2021-10-26
Payer: MEDICAID

## 2021-10-26 DIAGNOSIS — Z00.00 ANNUAL PHYSICAL EXAM: ICD-10-CM

## 2021-10-26 DIAGNOSIS — E03.9 ACQUIRED HYPOTHYROIDISM: ICD-10-CM

## 2021-10-26 LAB
ALBUMIN SERPL-MCNC: 3.7 G/DL (ref 3.4–5)
ALBUMIN/GLOB SERPL: 1.2 {RATIO} (ref 0.8–1.7)
ALP SERPL-CCNC: 84 U/L (ref 45–117)
ALT SERPL-CCNC: 38 U/L (ref 13–56)
ANION GAP SERPL CALC-SCNC: 6 MMOL/L (ref 3–18)
AST SERPL-CCNC: 18 U/L (ref 10–38)
BASOPHILS # BLD: 0 K/UL (ref 0–0.1)
BASOPHILS NFR BLD: 1 % (ref 0–2)
BILIRUB SERPL-MCNC: 0.5 MG/DL (ref 0.2–1)
BUN SERPL-MCNC: 14 MG/DL (ref 7–18)
BUN/CREAT SERPL: 14 (ref 12–20)
CALCIUM SERPL-MCNC: 9.1 MG/DL (ref 8.5–10.1)
CHLORIDE SERPL-SCNC: 106 MMOL/L (ref 100–111)
CHOLEST SERPL-MCNC: 181 MG/DL
CO2 SERPL-SCNC: 28 MMOL/L (ref 21–32)
CREAT SERPL-MCNC: 1 MG/DL (ref 0.6–1.3)
DIFFERENTIAL METHOD BLD: ABNORMAL
EOSINOPHIL # BLD: 0 K/UL (ref 0–0.4)
EOSINOPHIL NFR BLD: 0 % (ref 0–5)
ERYTHROCYTE [DISTWIDTH] IN BLOOD BY AUTOMATED COUNT: 12 % (ref 11.6–14.5)
GLOBULIN SER CALC-MCNC: 3 G/DL (ref 2–4)
GLUCOSE SERPL-MCNC: 108 MG/DL (ref 74–99)
HCT VFR BLD AUTO: 40.4 % (ref 35–45)
HDLC SERPL-MCNC: 42 MG/DL (ref 40–60)
HDLC SERPL: 4.3 {RATIO} (ref 0–5)
HGB BLD-MCNC: 13.1 G/DL (ref 12–16)
LDLC SERPL CALC-MCNC: 78.2 MG/DL (ref 0–100)
LIPID PROFILE,FLP: ABNORMAL
LYMPHOCYTES # BLD: 1.4 K/UL (ref 0.9–3.6)
LYMPHOCYTES NFR BLD: 32 % (ref 21–52)
MCH RBC QN AUTO: 31.7 PG (ref 24–34)
MCHC RBC AUTO-ENTMCNC: 32.4 G/DL (ref 31–37)
MCV RBC AUTO: 97.8 FL (ref 78–100)
MONOCYTES # BLD: 0.4 K/UL (ref 0.05–1.2)
MONOCYTES NFR BLD: 8 % (ref 3–10)
NEUTS SEG # BLD: 2.6 K/UL (ref 1.8–8)
NEUTS SEG NFR BLD: 59 % (ref 40–73)
PLATELET # BLD AUTO: 177 K/UL (ref 135–420)
PMV BLD AUTO: 10.2 FL (ref 9.2–11.8)
POTASSIUM SERPL-SCNC: 4.3 MMOL/L (ref 3.5–5.5)
PROT SERPL-MCNC: 6.7 G/DL (ref 6.4–8.2)
RBC # BLD AUTO: 4.13 M/UL (ref 4.2–5.3)
SODIUM SERPL-SCNC: 140 MMOL/L (ref 136–145)
T4 FREE SERPL-MCNC: 1.1 NG/DL (ref 0.7–1.5)
TRIGL SERPL-MCNC: 304 MG/DL (ref ?–150)
TSH SERPL DL<=0.05 MIU/L-ACNC: 3.51 UIU/ML (ref 0.36–3.74)
VLDLC SERPL CALC-MCNC: 60.8 MG/DL
WBC # BLD AUTO: 4.4 K/UL (ref 4.6–13.2)

## 2021-10-26 PROCEDURE — 36415 COLL VENOUS BLD VENIPUNCTURE: CPT

## 2021-10-26 PROCEDURE — 80061 LIPID PANEL: CPT

## 2021-10-26 PROCEDURE — 80053 COMPREHEN METABOLIC PANEL: CPT

## 2021-10-26 PROCEDURE — 84439 ASSAY OF FREE THYROXINE: CPT

## 2021-10-26 PROCEDURE — 85025 COMPLETE CBC W/AUTO DIFF WBC: CPT

## 2021-10-28 DIAGNOSIS — Z12.31 BREAST CANCER SCREENING BY MAMMOGRAM: ICD-10-CM

## 2021-11-01 DIAGNOSIS — K21.9 GASTROESOPHAGEAL REFLUX DISEASE WITHOUT ESOPHAGITIS: ICD-10-CM

## 2021-11-01 RX ORDER — FAMOTIDINE 40 MG/1
TABLET, FILM COATED ORAL
Qty: 90 TABLET | Refills: 0 | Status: SHIPPED | OUTPATIENT
Start: 2021-11-01 | End: 2022-03-30

## 2021-11-01 NOTE — PROGRESS NOTES
All results are within normal range no significant abnormality except for triglyceride elevated level that would improve with decreasing carbohydrates and sugars and increase physical activity as tolerated

## 2022-01-05 ENCOUNTER — TELEPHONE (OUTPATIENT)
Dept: FAMILY MEDICINE CLINIC | Age: 52
End: 2022-01-05

## 2022-01-05 DIAGNOSIS — Z72.0 TOBACCO USE: ICD-10-CM

## 2022-01-05 DIAGNOSIS — J43.2 CENTRILOBULAR EMPHYSEMA (HCC): ICD-10-CM

## 2022-01-05 RX ORDER — FLUTICASONE PROPIONATE 110 UG/1
2 AEROSOL, METERED RESPIRATORY (INHALATION) EVERY 12 HOURS
Qty: 3 EACH | Refills: 1 | Status: SHIPPED | OUTPATIENT
Start: 2022-01-05 | End: 2022-11-21

## 2022-01-05 RX ORDER — ALBUTEROL SULFATE 90 UG/1
AEROSOL, METERED RESPIRATORY (INHALATION)
Qty: 1 EACH | Refills: 3 | Status: SHIPPED | OUTPATIENT
Start: 2022-01-05

## 2022-01-05 RX ORDER — IBUPROFEN 200 MG
1 TABLET ORAL EVERY 24 HOURS
Qty: 30 PATCH | Refills: 0 | Status: SHIPPED | OUTPATIENT
Start: 2022-01-05 | End: 2022-11-21

## 2022-01-05 NOTE — TELEPHONE ENCOUNTER
Last visit: 10/19/2021  Next visit: No future appointments.  Last filled:    flovent  mcg inhaler; qty 1 w/11 refills  Albuterol HFA 90 mcg inhaler; qty 1 w/11 refills  Nicotine patch; 30 patch w/no refills

## 2022-01-19 ENCOUNTER — HOSPITAL ENCOUNTER (OUTPATIENT)
Dept: PHYSICAL THERAPY | Age: 52
End: 2022-01-19

## 2022-02-02 ENCOUNTER — HOSPITAL ENCOUNTER (OUTPATIENT)
Dept: PHYSICAL THERAPY | Age: 52
Discharge: HOME | End: 2022-02-02
Payer: MEDICAID

## 2022-02-02 PROCEDURE — 97162 PT EVAL MOD COMPLEX 30 MIN: CPT

## 2022-02-02 NOTE — PROGRESS NOTES
[address] [place] [address] PHYSICAL THERAPY  [address] [place] [address] [address],[facility], [facility] - Phone: [phone]  Fax: [phone] / [address] [facility]  Patient Name: Marvin Boxer : 1970   Medical   Diagnosis: Other low back pain [M54.59] Treatment Diagnosis: Other low back pain [M54.59]   Onset Date: Chronic     Referral Source: ALBA Jaime Start of Care [facility]): 2022   Prior Hospitalization: See medical history Provider #: [id number]   Prior Level of Function: Progressive decline in function and QOL   Comorbidities: Fibromyalgia, >BMI, sleep dysfunction, depression, arthritis, thyroid problems, tobacco use, LBP   Medications: Verified on Patient Summary List   The Plan of Care and following information is based on the information from the initial evaluation.   ===========================================================================================  Subjective: Progressive worsening with home chores, vacuuming, . Standing and walking and sitting for duration. Standing tolerance at ~30 minutes, walking about the same. Reports has arthritis and family issues of back pain. Reports unemployed, takes care of the dogs and cats at home. Reports Dx of Fibromyalgia and sees psychiatrist and takes Cymbalta and Neurontin, and has Rheumatologist. Reports tylenol. Reports sleep is tough and she has a hard time getting comfortable. Reports she doesn't have a car, she wishes to get HEP to do at home. Assessment / key information:  Pt presents to [facility] at [facility], [facility]. with signs and symptoms congruent with a diagnosis of Low Back Pain, stenotic type presentation without radicular symptoms, but with intolerance to extension. Pt does not tolerate long duration positioning. Pt wishing 1x visit as she lacks transportation.  Therefor HEP was developed alongside this patient, handout created, movements demo'd and completed. All questions answered. OBJECTIVE:   Gait/Posture: Stiff guarded trunk, minimal arms swing, noted slight left hip hiking. Held upright trunk with rounded shoulders. Trunk AROM  Flexion: finger-tips to Knees, poor hip motion  Extension: 10 deg  Side Bend: R: 20 deg,  L: 20 deg  Rotation: R: 10 deg,  L: 25 deg    Palpation: TTP to the Lumbosacral junction. Transfer/Squat: Uses 1x UE to rise and sit. HEP: Access Code: [email or web address]  URL: [email or web address]  Date: 02/02/2022 Prepared by: Emani Mustafa    Exercises  Advised on Nasal Breathing, minimal receptiveness. Supine Posterior Pelvic Tilt - 1 x daily - 7 x weekly - 2 sets - 10 reps  Supine Lower Trunk Rotation - 1 x daily - 7 x weekly - 2 sets - 10 reps  Bent Knee Fallouts - 1 x daily - 7 x weekly - 2 sets - 10 reps  Supine Hip Adduction Isometric with Ball - 1 x daily - 7 x weekly - 2 sets - 10 reps  Hooklying Clamshell with Resistance - 1 x daily - 7 x weekly - 2 sets - 10 reps  Seated Thoracic Flexion and Rotation with Arms Crossed - 1 x daily - 7 x weekly - 1 sets - 10 reps  Seated Thoracic Flexion and Extension - 1 x daily - 7 x weekly - 1 sets - 10 reps  Standing 'L' Stretch at Counter - 1 x daily - 7 x weekly - 1 sets - 10 reps  Standing Lumbar Extension at Wall - Forearms - 1 x daily - 7 x weekly - 1 sets - 10 reps    ===========================================================================================  Eval Complexity: History HIGH Complexity :3+ comorbidities / personal factors will impact the outcome/ POC ;  Examination  LOW Complexity : 1-2 Standardized tests and measures addressing body structure, function, activity limitation and / or participation in recreation ; Presentation MEDIUM Complexity : Evolving with changing characteristics ;   Decision Making HIGH Complexity : FOTO score of 1- 25 ; Overall Complexity MEDIUM  Problem List: pain affecting function, decrease ROM, decrease ADL/ functional abilitiies and decrease activity tolerance   Treatment Plan may include any combination of the following: Other: HEP and pt edu  Patient / Family readiness to learn indicated by: asking questions, trying to perform skills and interest  Persons(s) to be included in education: patient (P)  Barriers to Learning/Limitations: None  Measures taken, if barriers to learning:    Patient Goal (s): Get exercises to do at home   Patient self reported health status: poor  Rehabilitation Potential: fair   Short Term Goals: To be accomplished in  1 visit  Pt to demonstrate HEP movement in clinic: MET  Pt to report understanding of condition and agree to current POC: MET    Frequency / Duration:   Patient to be seen  1  times today for HEP development and implementation  Patient / Caregiver education and instruction: self care, activity modification and exercises  Therapist Signature: Thomas Franks PT Date: 4/4/6747   Certification Period: - Time: 1:20 PM   ===========================================================================================  I certify that the above Physical Therapy Services are being furnished while the patient is under my care. I agree with the treatment plan and certify that this therapy is necessary. Physician Signature:        Date:       Time:                                        ALBA Orr  Please sign and return to [facility] AT [facility] at [facility], [facility]. or you may fax the signed copy to [phone]. Thank you. MEDICAID  To ensure your patient receives the highest quality care and to avoid disruption in therapy please sign and return this plan of care within 21 days. Per Medicaid guidelines if the plan of care is not received within 21 days the patient's care must be put on hold until signed.

## 2022-02-02 NOTE — PROGRESS NOTES
PHYSICAL THERAPY - DAILY TREATMENT NOTE    Patient Name: Elia Boggs        Date: 2022  : 1970   YES Patient  Verified  Visit #:   1   of   1  Insurance: Payor: Rudy Fuchs / Plan: [facility] [address] [employer] / Product Type: Managed Care Medicaid /      In time: 1:30 Out time: 2:05   Total Treatment Time: 35     TREATMENT AREA =  Other low back pain [M54.59]    SUBJECTIVE    Pain Level (on 0 to 10 scale):  8  / 10   Medication Changes/New allergies or changes in medical history, any new surgeries or procedures? NO    If yes, update Summary List   Subjective Functional Status/Changes:  []  No changes reported     See POC          OBJECTIVE    20(NB) min Self Care: Reviewed diagnosis, prognosis, therapy progression, HEP   Rationale:    Improve understanding of injury and therapy to have realistic expectation of therapy to improve compliance/adherence and satisfaction    Billed With/As:   [] TE   [] TA   [] Neuro   [x] Self Care Patient Education: [x] Review HEP    [] Progressed/Changed HEP based on:   [x] Addressed barriers and behaviors     [] Therapeutic Neuroscience Pain Education, metaphor, reframing, contexts. [x] Sleep Education   [] Body Mechanics [x] Healing Timeframe     [] Self STM with ball at \"the spot\"  [x] Walking Program/Global Activity   [] other:         Other Objective/Functional Measures:    See POC note     Post Treatment Pain Level (on 0 to 10) scale:   8 / 10     ASSESSMENT  Assessment/Changes in Function:     See POC     []  See Progress Note/Recertification   Patient to complete HEP   Progress toward goals / Updated goals:    Pt evaluated today.  See POC     PLAN  []  Upgrade activities as tolerated YES Continue plan of care   [x]  Discharge due to : Pt completing HEP   []  Other:      Therapist: Hector Rodriguez, PT, DPT, OCS    Date: 2022 Time: 1:19 PM

## 2022-02-02 NOTE — PROGRESS NOTES
[facility] PHYSICAL THERAPY  [address] [facility] [address],[facility], [facility] - Phone: [phone]  Fax: [phone]    DISCHARGE NOTE  Patient Name: Suly De Jesus : 1970   Treatment/Medical Diagnosis: Other low back pain [M54.59]   Referral Source: ALBA Hamilton     Date of Initial Visit: 22 Attended Visits: 1 Missed Visits: -       SUMMARY OF TREATMENT  Pt attended only initial evaluation , HEP was developed and delivered. See Eval on this same date. RECOMMENDATIONS  Discontinue physical therapy due to ONE VISIT ONLY      If you have any questions/comments please contact us directly at [phone]. Thank you for allowing us to assist in the care of your patient. Therapist Signature: Glenn Rivers PT Date: 22     Time: 2:18 PM     NOTE TO PHYSICIAN:  Your patient's insurance requires this discharge note be signed and returned. PLEASE COMPLETE THE ORDERS BELOW AND RETURN TO:  [facility] PHYSICAL THERAPY    ___ I have read the above report and request that my patient be discharged from therapy.      Physician Signature:        Date:       Time:    ALBA Hamilton

## 2022-03-05 DIAGNOSIS — E78.5 HYPERLIPIDEMIA, UNSPECIFIED HYPERLIPIDEMIA TYPE: ICD-10-CM

## 2022-03-05 RX ORDER — ATORVASTATIN CALCIUM 40 MG/1
TABLET, FILM COATED ORAL
Qty: 90 TABLET | Refills: 0 | Status: SHIPPED | OUTPATIENT
Start: 2022-03-05 | End: 2022-10-07

## 2022-03-18 PROBLEM — Z72.0 TOBACCO USE: Status: ACTIVE | Noted: 2018-12-06

## 2022-03-18 PROBLEM — R00.2 PALPITATIONS: Status: ACTIVE | Noted: 2020-10-27

## 2022-03-18 PROBLEM — E66.01 SEVERE OBESITY (HCC): Status: ACTIVE | Noted: 2020-08-07

## 2022-03-19 PROBLEM — F41.0 PANIC ATTACK: Status: ACTIVE | Noted: 2020-05-12

## 2022-03-19 PROBLEM — K52.832 LYMPHOCYTIC COLITIS: Status: ACTIVE | Noted: 2018-12-06

## 2022-03-19 PROBLEM — R53.82 CHRONIC FATIGUE: Status: ACTIVE | Noted: 2020-05-12

## 2022-03-19 PROBLEM — R06.09 DOE (DYSPNEA ON EXERTION): Status: ACTIVE | Noted: 2020-10-27

## 2022-03-19 PROBLEM — E55.9 VITAMIN D DEFICIENCY: Status: ACTIVE | Noted: 2019-03-30

## 2022-03-19 PROBLEM — F43.10 PTSD (POST-TRAUMATIC STRESS DISORDER): Status: ACTIVE | Noted: 2020-05-12

## 2022-03-19 PROBLEM — K21.9 GASTROESOPHAGEAL REFLUX DISEASE: Status: ACTIVE | Noted: 2018-12-06

## 2022-03-19 PROBLEM — E03.9 ACQUIRED HYPOTHYROIDISM: Status: ACTIVE | Noted: 2018-12-06

## 2022-03-19 PROBLEM — M79.7 FIBROMYALGIA: Status: ACTIVE | Noted: 2018-12-06

## 2022-03-20 PROBLEM — N20.0 NEPHROLITHIASIS: Status: ACTIVE | Noted: 2020-10-27

## 2022-03-20 PROBLEM — F32.1 CURRENT MODERATE EPISODE OF MAJOR DEPRESSIVE DISORDER (HCC): Status: ACTIVE | Noted: 2018-12-06

## 2022-03-20 PROBLEM — E78.5 HYPERLIPIDEMIA: Status: ACTIVE | Noted: 2018-12-06

## 2022-03-20 PROBLEM — E66.9 OBESITY (BMI 30-39.9): Status: ACTIVE | Noted: 2018-12-06

## 2022-03-20 PROBLEM — F41.1 GAD (GENERALIZED ANXIETY DISORDER): Status: ACTIVE | Noted: 2020-05-12

## 2022-03-30 DIAGNOSIS — K21.9 GASTROESOPHAGEAL REFLUX DISEASE WITHOUT ESOPHAGITIS: ICD-10-CM

## 2022-03-30 RX ORDER — FAMOTIDINE 40 MG/1
TABLET, FILM COATED ORAL
Qty: 90 TABLET | Refills: 0 | Status: SHIPPED | OUTPATIENT
Start: 2022-03-30 | End: 2022-07-19

## 2022-03-30 RX ORDER — OMEPRAZOLE 20 MG/1
CAPSULE, DELAYED RELEASE ORAL
Qty: 90 CAPSULE | Refills: 0 | Status: SHIPPED | OUTPATIENT
Start: 2022-03-30 | End: 2022-07-19

## 2022-04-11 ENCOUNTER — OFFICE VISIT (OUTPATIENT)
Dept: FAMILY MEDICINE CLINIC | Age: 52
End: 2022-04-11
Payer: MEDICAID

## 2022-04-11 VITALS
HEIGHT: 65 IN | WEIGHT: 234.6 LBS | DIASTOLIC BLOOD PRESSURE: 63 MMHG | SYSTOLIC BLOOD PRESSURE: 102 MMHG | OXYGEN SATURATION: 97 % | HEART RATE: 80 BPM | TEMPERATURE: 97.9 F | RESPIRATION RATE: 16 BRPM | BODY MASS INDEX: 39.09 KG/M2

## 2022-04-11 DIAGNOSIS — R56.9 SEIZURE (HCC): Primary | ICD-10-CM

## 2022-04-11 DIAGNOSIS — M79.7 FIBROMYALGIA: ICD-10-CM

## 2022-04-11 DIAGNOSIS — E03.9 ACQUIRED HYPOTHYROIDISM: ICD-10-CM

## 2022-04-11 DIAGNOSIS — E66.01 SEVERE OBESITY (HCC): ICD-10-CM

## 2022-04-11 PROCEDURE — 99214 OFFICE O/P EST MOD 30 MIN: CPT | Performed by: LEGAL MEDICINE

## 2022-04-11 NOTE — PROGRESS NOTES
Juan Pablo Nair is a 46 y.o. female (: 1970) presenting to address:    Chief Complaint   Patient presents with   [facility] Follow Up       Vitals:    22 0744   BP: 102/63   Pulse: 80   Resp: 16   Temp: 97.9 °F (36.6 °C)   TempSrc: Temporal   SpO2: 97%   Weight: 234 lb 9.6 oz (106.4 kg)   Height: 5' 5\" (1.651 m)   PainSc:   8   PainLoc: Generalized       Hearing/Vision:   No exam data present    Learning Assessment:     Learning Assessment 2018   PRIMARY LEARNER Patient   HIGHEST LEVEL OF EDUCATION - PRIMARY LEARNER  DID NOT GRADUATE HIGH SCHOOL   BARRIERS PRIMARY LEARNER NONE   CO-LEARNER CAREGIVER No   PRIMARY LANGUAGE ENGLISH   LEARNER PREFERENCE PRIMARY LISTENING   ANSWERED BY patient   RELATIONSHIP SELF     Depression Screening:     3 most recent PHQ Screens 2022   Little interest or pleasure in doing things Not at all   Feeling down, depressed, irritable, or hopeless Not at all   Total Score PHQ 2 0   Trouble falling or staying asleep, or sleeping too much -   Feeling tired or having little energy -   Poor appetite, weight loss, or overeating -   Feeling bad about yourself - or that you are a failure or have let yourself or your family down -   Trouble concentrating on things such as school, work, reading, or watching TV -   Moving or speaking so slowly that other people could have noticed; or the opposite being so fidgety that others notice -   Thoughts of being better off dead, or hurting yourself in some way -   PHQ 9 Score -   How difficult have these problems made it for you to do your work, take care of your home and get along with others -     Fall Risk Assessment:     Fall Risk Assessment, last 12 mths 2022   Able to walk? Yes   Fall in past 12 months? 0   Do you feel unsteady? 0   Are you worried about falling 0     Abuse Screening:     Abuse Screening Questionnaire 2022   Do you ever feel afraid of your partner?  N   Are you in a relationship with someone who physically or mentally threatens you? N   Is it safe for you to go home? Y     ADL Assessment:   No flowsheet data found. Coordination of Care Questionaire:   1. \"Have you been to the ER, urgent care clinic since your last visit? Hospitalized since your last visit? \" Yes Where: [facility]    2. \"Have you seen or consulted any other health care providers outside of the [address] [place] [address] since your last visit? \" No     3. For patients aged 39-70: Has the patient had a colonoscopy? Yes - no Care Gap present     If the patient is female:    4. For patients aged 41-77: Has the patient had a mammogram within the past 2 years? Yes - no Care Gap present    5. For patients aged 21-65: Has the patient had a pap smear? Yes - no Care Gap present    Advanced Directive:   1. Do you have an Advanced Directive? NO    2. Would you like information on Advanced Directives?  NO

## 2022-04-11 NOTE — PROGRESS NOTES
Niki Castleman     Chief Complaint   Patient presents with   [facility] Follow Up     Vitals:    04/11/22 0744   BP: 102/63   Pulse: 80   Resp: 16   Temp: 97.9 °F (36.6 °C)   TempSrc: Temporal   SpO2: 97%   Weight: 234 lb 9.6 oz (106.4 kg)   Height: 5' 5\" (1.651 m)   PainSc:   8   PainLoc: Generalized         HPI: Paul Subramanian is here for follow up after ER visit on April 7 for episode of seizure for the first time, had CT scan of the head as per patient , was D/C home same day from ER   She was sitting in the nail salon to get her nail done accompanied daughter , when  she had seizure and bit her tongue it lasted for one minute then she was taken to the ambulance had post seizure confusion, still does  not feel right , her hands are shaking , she can not recall with  is the president   She is AO x 3  Her fiance  drove her here today     She has a different [facility] chart emergency room visit does not show any MyChart, record has been requested    We will review results when we get them for any further evaluation    Patient has not had any seizures since the first one     Past Medical History:   Diagnosis Date    Breast cyst     Depression     Emphysema lung ([address]     1 year and a half it was mentioned by ER Doc.     Fibromyalgia     Hyperlipidemia     Hypothyroidism     Ovarian cyst      Past Surgical History:   Procedure Laterality Date    HX APPENDECTOMY      HX GYN      Cyst, Uterine Ablation    HX TUBAL LIGATION      IMPLANT BREAST SILICONE/EQ Bilateral     Approximately 1999    VA BREAST SURGERY PROCEDURE UNLISTED       Social History     Tobacco Use    Smoking status: Current Every Day Smoker     Packs/day: 0.50     Types: Cigarettes    Smokeless tobacco: Current User    Tobacco comment: patient stated she quit three weeks ago cig and now uses vape   Substance Use Topics    Alcohol use: Yes     Comment: occ       Family History   Problem Relation Age of Onset    Heart Disease Mother    [facility] Dementia Mother     Heart Disease Father     Heart Disease Sister     Mult Sclerosis Sister     Heart Disease Brother         3 heart attacks    Cancer Paternal Aunt         breast    Breast Cancer Maternal Aunt     Diabetes Neg Hx     Hypertension Neg Hx        Review of Systems   Constitutional: Negative for chills, fever, malaise/fatigue and weight loss. HENT: Negative for congestion, ear discharge, ear pain, hearing loss, nosebleeds, sinus pain and sore throat. Eyes: Negative for blurred vision, double vision and discharge. Respiratory: Negative for cough, hemoptysis, sputum production, shortness of breath and wheezing. Cardiovascular: Negative for chest pain, palpitations, claudication and leg swelling. Gastrointestinal: Negative for abdominal pain, constipation, diarrhea, nausea and vomiting. Genitourinary: Negative for dysuria, frequency and urgency. Musculoskeletal: Positive for back pain and myalgias. Negative for joint pain and neck pain. Skin: Negative for itching and rash. Neurological: Positive for seizures, loss of consciousness and headaches. Negative for dizziness, tingling, sensory change, speech change, focal weakness and weakness. Psychiatric/Behavioral: Positive for memory loss. Negative for depression and suicidal ideas. The patient is not nervous/anxious and does not have insomnia. Physical Exam  Constitutional:       General: She is not in acute distress. Appearance: She is well-developed. She is not diaphoretic. HENT:      Head: Normocephalic and atraumatic. Mouth/Throat:      Pharynx: No oropharyngeal exudate. Eyes:      General: No scleral icterus. Right eye: No discharge. Left eye: No discharge. Conjunctiva/sclera: Conjunctivae normal.      Pupils: Pupils are equal, round, and reactive to light. Neck:      Thyroid: No thyromegaly. Cardiovascular:      Rate and Rhythm: Normal rate and regular rhythm.       Heart sounds: Normal heart sounds. Pulmonary:      Effort: Pulmonary effort is normal. No respiratory distress. Breath sounds: Normal breath sounds. No wheezing or rales. Chest:      Chest wall: No tenderness. Abdominal:      General: There is no distension. Palpations: Abdomen is soft. Tenderness: There is no abdominal tenderness. There is no rebound. Musculoskeletal:         General: No tenderness, deformity or signs of injury. Normal range of motion. Right lower leg: Edema present. Left lower leg: Edema present. Lymphadenopathy:      Cervical: No cervical adenopathy. Skin:     General: Skin is warm and dry. Coloration: Skin is not pale. Findings: No erythema or rash. Neurological:      Mental Status: She is alert and oriented to person, place, and time. Cranial Nerves: No cranial nerve deficit. Coordination: Coordination normal.   Psychiatric:         Mood and Affect: Mood normal.         Behavior: Behavior normal.         Thought Content: Thought content normal.         Judgment: Judgment normal.          Assessment and plan     Plan of care has been discussed with the patient, he agrees to the plan and verbalized understanding. All his questions were answered  More than 50% of the time spent in this visit was counseling the patient about  illness and treatment options         1. Seizure ([address]  Need further evaluation and possible EEG  Patient does understand that she cannot drive and medical on stairs and to be careful    - REFERRAL TO NEUROLOGY    2. Acquired hypothyroidism  Stable on current medications    3. Severe obesity ([address]  She gained 13 pounds since last visit in October    4.  Fibromyalgia  She is on gabapentin and duloxetine    Current Outpatient Medications   Medication Sig Dispense Refill    omeprazole (PRILOSEC) 20 mg capsule Take 1 capsule by mouth once daily 90 Capsule 0    famotidine (PEPCID) 40 mg tablet Take 1 tablet by mouth once daily 90 Tablet 0    atorvastatin (LIPITOR) 40 mg tablet Take 1 tablet by mouth once daily 90 Tablet 0    albuterol (PROVENTIL HFA, VENTOLIN HFA, PROAIR HFA) 90 mcg/actuation inhaler Albuterol inhaler, 2 puffs up to every 4 hours if needed for wheezing, cough, shortness of breath 1 Each 3    fluticasone propionate (FLOVENT HFA) 110 mcg/actuation inhaler Take 2 Puffs by inhalation every twelve (12) hours for 90 days. 3 Each 1    Euthyrox 137 mcg tablet Take 137 mcg by mouth daily.  ergocalciferol (ERGOCALCIFEROL) 1,250 mcg (50,000 unit) capsule Take 1 Capsule by mouth every seven (7) days for 90 days. 12 Capsule 3    gabapentin (NEURONTIN) 300 mg capsule Take 300 mg by mouth two (2) times a day. Will be prescribed by rheumatology, Dr. Jaclyn Valentin.  propranoloL (INDERAL) 20 mg tablet TAKE 1 TABLET BY MOUTH TWICE DAILY AS NEEDED FOR ANXIETY      chlorproMAZINE (THORAZINE) 200 mg tablet Take 200 mg by mouth nightly.  ALPRAZolam (XANAX) 1 mg tablet TAKE 2 & 1 2 (TWO & ONE HALF) TABLETS BY MOUTH ONCE DAILY AS NEEDED FOR ANXIETY      Trintellix 10 mg tablet TAKE 3 TABLETS BY MOUTH ONCE DAILY      zolpidem CR (AMBIEN CR) 12.5 mg tablet Take 12.5 mg by mouth nightly.  amitriptyline (ELAVIL) 100 mg tablet Take 200 mg by mouth nightly.  acetaminophen (TYLENOL EXTRA STRENGTH) 500 mg tablet Take  by mouth every six (6) hours as needed for Pain.  DULoxetine (CYMBALTA) 60 mg capsule Take 60 mg by mouth daily. Will be prescribed by rheumatology, Dr. Jaclyn Valentin.  ARIPiprazole (ABILIFY) 10 mg tablet Take 10 mg by mouth daily.          Patient Active Problem List    Diagnosis Date Noted    Palpitations 10/27/2020    Nephrolithiasis 10/27/2020    GANT (dyspnea on exertion) 10/27/2020    Severe obesity ([address] 08/07/2020    ISA (generalized anxiety disorder) 05/12/2020    PTSD (post-traumatic stress disorder) 05/12/2020    Panic attack 05/12/2020    Chronic fatigue 05/12/2020    Vitamin D deficiency 03/30/2019    Current moderate episode of major depressive disorder [address] [address] [facility] 12/06/2018    Hyperlipidemia 12/06/2018    Acquired hypothyroidism 12/06/2018    Fibromyalgia 12/06/2018    Obesity (BMI 30-39.9) 12/06/2018    Tobacco use 12/06/2018    Gastroesophageal reflux disease 12/06/2018    Lymphocytic colitis 12/06/2018     Results for orders placed or performed during the hospital encounter of 10/26/21   CBC WITH AUTOMATED DIFF   Result Value Ref Range    WBC 4.4 (L) 4.6 - 13.2 K/uL    RBC 4.13 (L) 4.20 - 5.30 M/uL    HGB 13.1 12.0 - 16.0 g/dL    HCT 40.4 35.0 - 45.0 %    MCV 97.8 78.0 - 100.0 FL    MCH 31.7 24.0 - 34.0 PG    MCHC 32.4 31.0 - 37.0 g/dL    RDW 12.0 11.6 - 14.5 %    PLATELET 484 123 - 142 K/uL    MPV 10.2 9.2 - 11.8 FL    NEUTROPHILS 59 40 - 73 %    LYMPHOCYTES 32 21 - 52 %    MONOCYTES 8 3 - 10 %    EOSINOPHILS 0 0 - 5 %    BASOPHILS 1 0 - 2 %    ABS. NEUTROPHILS 2.6 1.8 - 8.0 K/UL    ABS. LYMPHOCYTES 1.4 0.9 - 3.6 K/UL    ABS. MONOCYTES 0.4 0.05 - 1.2 K/UL    ABS. EOSINOPHILS 0.0 0.0 - 0.4 K/UL    ABS. BASOPHILS 0.0 0.0 - 0.1 K/UL    DF AUTOMATED     LIPID PANEL   Result Value Ref Range    LIPID PROFILE          Cholesterol, total 181 <200 MG/DL    Triglyceride 304 (H) <150 MG/DL    HDL Cholesterol 42 40 - 60 MG/DL    LDL, calculated 78.2 0 - 100 MG/DL    VLDL, calculated 60.8 MG/DL    CHOL/HDL Ratio 4.3 0 - 5.0     METABOLIC PANEL, COMPREHENSIVE   Result Value Ref Range    Sodium 140 136 - 145 mmol/L    Potassium 4.3 3.5 - 5.5 mmol/L    Chloride 106 100 - 111 mmol/L    CO2 28 21 - 32 mmol/L    Anion gap 6 3.0 - 18 mmol/L    Glucose 108 (H) 74 - 99 mg/dL    BUN 14 7.0 - 18 MG/DL    Creatinine 1.00 0.6 - 1.3 MG/DL    BUN/Creatinine ratio 14 12 - 20      GFR est AA >60 >60 ml/min/1.73m2    GFR est non-AA 58 (L) >60 ml/min/1.73m2    Calcium 9.1 8.5 - 10.1 MG/DL    Bilirubin, total 0.5 0.2 - 1.0 MG/DL    ALT (SGPT) 38 13 - 56 U/L    AST (SGOT) 18 10 - 38 U/L    Alk.  phosphatase 84 45 - 117 U/L    Protein, total 6.7 6.4 - 8.2 g/dL    Albumin 3.7 3.4 - 5.0 g/dL    Globulin 3.0 2.0 - 4.0 g/dL    A-G Ratio 1.2 0.8 - 1.7     TSH AND FREE T4   Result Value Ref Range    TSH 3.51 0.36 - 3.74 uIU/mL    T4, Free 1.1 0.7 - 1.5 NG/DL     No visits with results within 3 Month(s) from this visit. Latest known visit with results is:   Hospital Outpatient Visit on 10/26/2021   Component Date Value Ref Range Status    WBC 10/26/2021 4.4* 4.6 - 13.2 K/uL Final    RBC 10/26/2021 4.13* 4.20 - 5.30 M/uL Final    HGB 10/26/2021 13.1  12.0 - 16.0 g/dL Final    HCT 10/26/2021 40.4  35.0 - 45.0 % Final    MCV 10/26/2021 97.8  78.0 - 100.0 FL Final    MCH 10/26/2021 31.7  24.0 - 34.0 PG Final    MCHC 10/26/2021 32.4  31.0 - 37.0 g/dL Final    RDW 10/26/2021 12.0  11.6 - 14.5 % Final    PLATELET 32/10/3559 519  135 - 420 K/uL Final    MPV 10/26/2021 10.2  9.2 - 11.8 FL Final    NEUTROPHILS 10/26/2021 59  40 - 73 % Final    LYMPHOCYTES 10/26/2021 32  21 - 52 % Final    MONOCYTES 10/26/2021 8  3 - 10 % Final    EOSINOPHILS 10/26/2021 0  0 - 5 % Final    BASOPHILS 10/26/2021 1  0 - 2 % Final    ABS. NEUTROPHILS 10/26/2021 2.6  1.8 - 8.0 K/UL Final    ABS. LYMPHOCYTES 10/26/2021 1.4  0.9 - 3.6 K/UL Final    ABS. MONOCYTES 10/26/2021 0.4  0.05 - 1.2 K/UL Final    ABS. EOSINOPHILS 10/26/2021 0.0  0.0 - 0.4 K/UL Final    ABS. BASOPHILS 10/26/2021 0.0  0.0 - 0.1 K/UL Final    DF 10/26/2021 AUTOMATED    Final    LIPID PROFILE 10/26/2021        Final    Cholesterol, total 10/26/2021 181  <200 MG/DL Final    Triglyceride 10/26/2021 304* <150 MG/DL Final    Comment: The drugs N-acetylcysteine (NAC) and  Metamiszole have been found to cause falsely  low results in this chemical assay. Please  be sure to submit blood samples obtained  BEFORE administration of either of these  drugs to assure correct results.       HDL Cholesterol 10/26/2021 42  40 - 60 MG/DL Final    LDL, calculated 10/26/2021 78.2  0 - 100 MG/DL Final    VLDL, calculated 10/26/2021 60.8  MG/DL Final    CHOL/HDL Ratio 10/26/2021 4.3  0 - 5.0   Final    Sodium 10/26/2021 140  136 - 145 mmol/L Final    Potassium 10/26/2021 4.3  3.5 - 5.5 mmol/L Final    Chloride 10/26/2021 106  100 - 111 mmol/L Final    CO2 10/26/2021 28  21 - 32 mmol/L Final    Anion gap 10/26/2021 6  3.0 - 18 mmol/L Final    Glucose 10/26/2021 108* 74 - 99 mg/dL Final    BUN 10/26/2021 14  7.0 - 18 MG/DL Final    Creatinine 10/26/2021 1.00  0.6 - 1.3 MG/DL Final    BUN/Creatinine ratio 10/26/2021 14  12 - 20   Final    GFR est AA 10/26/2021 >60  >60 ml/min/1.73m2 Final    GFR est non-AA 10/26/2021 58* >60 ml/min/1.73m2 Final    Comment: (NOTE)  Estimated GFR is calculated using the Modification of Diet in Renal   Disease (MDRD) Study equation, reported for both  Americans   (GFRAA) and non- Americans (GFRNA), and normalized to 1.73m2   body surface area. The physician must decide which value applies to   the patient. The MDRD study equation should only be used in   individuals age 25 or older. It has not been validated for the   following: pregnant women, patients with serious comorbid conditions,   or on certain medications, or persons with extremes of body size,   muscle mass, or nutritional status.  Calcium 10/26/2021 9.1  8.5 - 10.1 MG/DL Final    Bilirubin, total 10/26/2021 0.5  0.2 - 1.0 MG/DL Final    ALT (SGPT) 10/26/2021 38  13 - 56 U/L Final    AST (SGOT) 10/26/2021 18  10 - 38 U/L Final    Alk.  phosphatase 10/26/2021 84  45 - 117 U/L Final    Protein, total 10/26/2021 6.7  6.4 - 8.2 g/dL Final    Albumin 10/26/2021 3.7  3.4 - 5.0 g/dL Final    Globulin 10/26/2021 3.0  2.0 - 4.0 g/dL Final    A-G Ratio 10/26/2021 1.2  0.8 - 1.7   Final    TSH 10/26/2021 3.51  0.36 - 3.74 uIU/mL Final    T4, Free 10/26/2021 1.1  0.7 - 1.5 NG/DL Final

## 2022-04-25 ENCOUNTER — TELEPHONE (OUTPATIENT)
Dept: FAMILY MEDICINE CLINIC | Age: 52
End: 2022-04-25

## 2022-04-25 NOTE — TELEPHONE ENCOUNTER
----- Message from Lonnie Farmer sent at 4/25/2022  2:51 PM EDT -----  Subject: Referral Request    QUESTIONS   Reason for referral request? referral for Neuro   Has the physician seen you for this condition before? No   Preferred Specialist (if applicable)? Do you already have an appointment scheduled? No  Additional Information for Provider? pt stated that the first referral was   in [facility] and the specialist could not see her until july and pt   wants something sooner. ---------------------------------------------------------------------------  --------------  Christian MCCOY  What is the best way for the office to contact you? OK to leave message on   voicemail  Preferred Call Back Phone Number? [phone]  ---------------------------------------------------------------------------  --------------  SCRIPT ANSWERS  Relationship to Patient?  Self

## 2022-04-26 ENCOUNTER — OFFICE VISIT (OUTPATIENT)
Dept: FAMILY MEDICINE CLINIC | Age: 52
End: 2022-04-26
Payer: MEDICAID

## 2022-04-26 VITALS
DIASTOLIC BLOOD PRESSURE: 82 MMHG | RESPIRATION RATE: 16 BRPM | BODY MASS INDEX: 38.89 KG/M2 | HEIGHT: 65 IN | TEMPERATURE: 97.4 F | HEART RATE: 99 BPM | WEIGHT: 233.4 LBS | OXYGEN SATURATION: 95 % | SYSTOLIC BLOOD PRESSURE: 106 MMHG

## 2022-04-26 DIAGNOSIS — M79.7 FIBROMYALGIA: ICD-10-CM

## 2022-04-26 DIAGNOSIS — M79.89 LEG SWELLING: ICD-10-CM

## 2022-04-26 DIAGNOSIS — Z72.0 TOBACCO USE: ICD-10-CM

## 2022-04-26 DIAGNOSIS — R25.1 TREMOR OF BOTH HANDS: ICD-10-CM

## 2022-04-26 DIAGNOSIS — E78.5 HYPERLIPIDEMIA, UNSPECIFIED HYPERLIPIDEMIA TYPE: ICD-10-CM

## 2022-04-26 DIAGNOSIS — E55.9 VITAMIN D DEFICIENCY: ICD-10-CM

## 2022-04-26 DIAGNOSIS — E03.9 ACQUIRED HYPOTHYROIDISM: Primary | ICD-10-CM

## 2022-04-26 DIAGNOSIS — J43.9 PULMONARY EMPHYSEMA, UNSPECIFIED EMPHYSEMA TYPE (HCC): ICD-10-CM

## 2022-04-26 DIAGNOSIS — R56.9 SEIZURE (HCC): ICD-10-CM

## 2022-04-26 PROCEDURE — 99214 OFFICE O/P EST MOD 30 MIN: CPT | Performed by: LEGAL MEDICINE

## 2022-04-26 RX ORDER — FUROSEMIDE 20 MG/1
20 TABLET ORAL DAILY
Qty: 30 TABLET | Refills: 0 | Status: SHIPPED | OUTPATIENT
Start: 2022-04-26 | End: 2022-05-18 | Stop reason: SDUPTHER

## 2022-04-26 NOTE — PROGRESS NOTES
Shawna Danielle is a 46 y.o. female (: 1970) presenting to address:    Chief Complaint   Patient presents with    Foot Swelling     Pt states her ability to speak and thinking and memory is a concern since she had a seizure last week       Vitals:    22 0842   Resp: 16   Temp: 97.4 °F (36.3 °C)   Weight: 233 lb 6.4 oz (105.9 kg)   Height: 5' 5\" (1.651 m)   PainSc:   8       Hearing/Vision:   No exam data present    Learning Assessment:     Learning Assessment 2018   PRIMARY LEARNER Patient   HIGHEST LEVEL OF EDUCATION - PRIMARY LEARNER  DID NOT GRADUATE HIGH SCHOOL   BARRIERS PRIMARY LEARNER NONE   CO-LEARNER CAREGIVER No   PRIMARY LANGUAGE ENGLISH   LEARNER PREFERENCE PRIMARY LISTENING   ANSWERED BY patient   RELATIONSHIP SELF     Depression Screening:     3 most recent PHQ Screens 2022   Little interest or pleasure in doing things Not at all   Feeling down, depressed, irritable, or hopeless Not at all   Total Score PHQ 2 0   Trouble falling or staying asleep, or sleeping too much -   Feeling tired or having little energy -   Poor appetite, weight loss, or overeating -   Feeling bad about yourself - or that you are a failure or have let yourself or your family down -   Trouble concentrating on things such as school, work, reading, or watching TV -   Moving or speaking so slowly that other people could have noticed; or the opposite being so fidgety that others notice -   Thoughts of being better off dead, or hurting yourself in some way -   PHQ 9 Score -   How difficult have these problems made it for you to do your work, take care of your home and get along with others -     Fall Risk Assessment:     Fall Risk Assessment, last 12 mths 2022   Able to walk? Yes   Fall in past 12 months? 0   Do you feel unsteady? 0   Are you worried about falling 0     Abuse Screening:     Abuse Screening Questionnaire 2022   Do you ever feel afraid of your partner?  N   Are you in a relationship with someone who physically or mentally threatens you? N   Is it safe for you to go home? Y     ADL Assessment:   No flowsheet data found. Coordination of Care Questionaire:   1. \"Have you been to the ER, urgent care clinic since your last visit? Hospitalized since your last visit? \" Yes When: Last week ER visit [facility]     2. \"Have you seen or consulted any other health care providers outside of the [address] [place] [address] since your last visit? \" Yes When: [facility] ER visit     3. For patients aged 39-70: Has the patient had a colonoscopy / FIT/ Cologuard? Yes - no Care Gap present      If the patient is female:    4. For patients aged 41-77: Has the patient had a mammogram within the past 2 years? Yes - no Care Gap present  See top three    5. For patients aged 21-65: Has the patient had a pap smear? Yes - no Care Gap present    Advanced Directive:   1. Do you have an Advanced Directive? NO    2. Would you like information on Advanced Directives?  NO

## 2022-04-26 NOTE — PROGRESS NOTES
Yohana Workman     Chief Complaint   Patient presents with    Foot Swelling     Pt states her ability to speak and thinking and memory is a concern since she had a seizure last week     Vitals:    04/26/22 0842   BP: 106/82   Pulse: 99   Resp: 16   Temp: 97.4 °F (36.3 °C)   SpO2: 95%   Weight: 233 lb 6.4 oz (105.9 kg)   Height: 5' 5\" (1.651 m)   PainSc:   8         HPI: Pooja Luong is here accompanied by her  complaining about recurrent lower leg swelling. Also she is complaining about tremors memory lapses getting worse after her recent episode of seizures on April 7, she has no recurrent seizures  Patient does have an appointment with neurologist on May 9    Also has appointment with neurosurgery for her back pain when her appointment is tomorrow and the other one on the 29th  She needs help with his appointment coordination I advised her to reach out to insurance     Past Medical History:   Diagnosis Date    Breast cyst     Depression     Emphysema lung ([facility] [address]     1 year and a half it was mentioned by ER Doc.     Fibromyalgia     Hyperlipidemia     Hypothyroidism     Ovarian cyst      Past Surgical History:   Procedure Laterality Date    HX APPENDECTOMY      HX GYN      Cyst, Uterine Ablation    HX TUBAL LIGATION      IMPLANT BREAST SILICONE/EQ Bilateral     Approximately 1999    AR BREAST SURGERY PROCEDURE UNLISTED       Social History     Tobacco Use    Smoking status: Current Every Day Smoker     Packs/day: 0.50     Types: Cigarettes    Smokeless tobacco: Current User    Tobacco comment: patient stated she quit three weeks ago cig and now uses vape   Substance Use Topics    Alcohol use: Yes     Comment: occ       Family History   Problem Relation Age of Onset    Heart Disease Mother     Dementia Mother     Heart Disease Father     Heart Disease Sister     Mult Sclerosis Sister     Heart Disease Brother         3 heart attacks    Cancer Paternal Aunt         breast    Breast Cancer Maternal Aunt     Diabetes Neg Hx     Hypertension Neg Hx        Review of Systems   Constitutional: Negative for chills, fever, malaise/fatigue and weight loss. HENT: Negative for congestion, ear discharge, ear pain, hearing loss, nosebleeds, sinus pain and sore throat. Eyes: Negative for blurred vision, double vision and discharge. Respiratory: Positive for cough and wheezing. Negative for hemoptysis, sputum production, shortness of breath and stridor. Cardiovascular: Negative for chest pain, palpitations, claudication and leg swelling. Gastrointestinal: Negative for abdominal pain, blood in stool, constipation, diarrhea, melena, nausea and vomiting. Genitourinary: Negative for dysuria, flank pain, frequency, hematuria and urgency. Musculoskeletal: Positive for back pain and myalgias. Negative for falls, joint pain and neck pain. Skin: Negative for itching and rash. Neurological: Negative for dizziness, tingling, sensory change, speech change, focal weakness, weakness and headaches. Psychiatric/Behavioral: Positive for depression and memory loss. Negative for hallucinations, substance abuse and suicidal ideas. The patient is nervous/anxious. The patient does not have insomnia. Physical Exam  Vitals and nursing note reviewed. Constitutional:       General: She is not in acute distress. Appearance: She is well-developed. She is not diaphoretic. HENT:      Head: Normocephalic and atraumatic. Mouth/Throat:      Pharynx: No oropharyngeal exudate. Eyes:      General: No scleral icterus. Right eye: No discharge. Left eye: No discharge. Conjunctiva/sclera: Conjunctivae normal.      Pupils: Pupils are equal, round, and reactive to light. Neck:      Thyroid: No thyromegaly. Cardiovascular:      Rate and Rhythm: Normal rate and regular rhythm. Heart sounds: Normal heart sounds. No murmur heard.       Pulmonary:      Effort: Pulmonary effort is normal. No respiratory distress. Breath sounds: Normal breath sounds. No wheezing or rales. Chest:      Chest wall: No tenderness. Abdominal:      General: There is no distension. Palpations: Abdomen is soft. Tenderness: There is no abdominal tenderness. There is no rebound. Musculoskeletal:         General: No tenderness or deformity. Normal range of motion. Right lower leg: Edema present. Left lower leg: Edema present. Comments: Bilateral lower extremity pitting edema more on the right side   Lymphadenopathy:      Cervical: No cervical adenopathy. Skin:     General: Skin is warm and dry. Coloration: Skin is not pale. Findings: No erythema or rash. Neurological:      Mental Status: She is alert and oriented to person, place, and time. Cranial Nerves: No cranial nerve deficit. Coordination: Coordination normal.   Psychiatric:         Behavior: Behavior normal.         Thought Content: Thought content normal.         Judgment: Judgment normal.          Assessment and plan     Plan of care has been discussed with the patient, he agrees to the plan and verbalized understanding. All his questions were answered  More than 50% of the time spent in this visit was counseling the patient about  illness and treatment options         1. Acquired hypothyroidism  She is adherent to medications    2. Seizure [facility])  She had 1 episode of seizures to follow-up with neurology for further evaluation and EEG    3. Fibromyalgia      4. Hyperlipidemia, unspecified hyperlipidemia type      5. Vitamin D deficiency      6. Pulmonary emphysema, unspecified emphysema type ([facility] [address]  She is still actively smoking half a pack a day strongly advised to consider smoking cessation    7. Tobacco use      8. Leg swelling    - furosemide (LASIX) 20 mg tablet; Take 1 Tablet by mouth daily. Dispense: 30 Tablet; Refill: 0    9.  Tremor of both hands  Further evaluation by neurologist    Current Outpatient Medications   Medication Sig Dispense Refill    furosemide (LASIX) 20 mg tablet Take 1 Tablet by mouth daily. 30 Tablet 0    omeprazole (PRILOSEC) 20 mg capsule Take 1 capsule by mouth once daily 90 Capsule 0    famotidine (PEPCID) 40 mg tablet Take 1 tablet by mouth once daily 90 Tablet 0    atorvastatin (LIPITOR) 40 mg tablet Take 1 tablet by mouth once daily 90 Tablet 0    albuterol (PROVENTIL HFA, VENTOLIN HFA, PROAIR HFA) 90 mcg/actuation inhaler Albuterol inhaler, 2 puffs up to every 4 hours if needed for wheezing, cough, shortness of breath 1 Each 3    fluticasone propionate (FLOVENT HFA) 110 mcg/actuation inhaler Take 2 Puffs by inhalation every twelve (12) hours for 90 days. 3 Each 1    Euthyrox 137 mcg tablet Take 137 mcg by mouth daily.  gabapentin (NEURONTIN) 300 mg capsule Take 300 mg by mouth two (2) times a day. Will be prescribed by rheumatology, Dr. Milla Mendieta.  propranoloL (INDERAL) 20 mg tablet TAKE 1 TABLET BY MOUTH TWICE DAILY AS NEEDED FOR ANXIETY      chlorproMAZINE (THORAZINE) 200 mg tablet Take 200 mg by mouth nightly.  ALPRAZolam (XANAX) 1 mg tablet TAKE 2 & 1 2 (TWO & ONE HALF) TABLETS BY MOUTH ONCE DAILY AS NEEDED FOR ANXIETY      Trintellix 10 mg tablet TAKE 3 TABLETS BY MOUTH ONCE DAILY      zolpidem CR (AMBIEN CR) 12.5 mg tablet Take 12.5 mg by mouth nightly.  amitriptyline (ELAVIL) 100 mg tablet Take 200 mg by mouth nightly.  acetaminophen (TYLENOL EXTRA STRENGTH) 500 mg tablet Take  by mouth every six (6) hours as needed for Pain.  DULoxetine (CYMBALTA) 60 mg capsule Take 60 mg by mouth daily. Will be prescribed by rheumatology, Dr. Milla Mendieta.  ergocalciferol (ERGOCALCIFEROL) 1,250 mcg (50,000 unit) capsule Take 1 Capsule by mouth every seven (7) days for 90 days.  12 Capsule 3       Patient Active Problem List    Diagnosis Date Noted    Palpitations 10/27/2020    Nephrolithiasis 10/27/2020    GANT (dyspnea on exertion) 10/27/2020    Severe obesity ([address] 08/07/2020    ISA (generalized anxiety disorder) 05/12/2020    PTSD (post-traumatic stress disorder) 05/12/2020    Panic attack 05/12/2020    Chronic fatigue 05/12/2020    Vitamin D deficiency 03/30/2019    Current moderate episode of major depressive disorder ([facility] [address] 12/06/2018    Hyperlipidemia 12/06/2018    Acquired hypothyroidism 12/06/2018    Fibromyalgia 12/06/2018    Obesity (BMI 30-39.9) 12/06/2018    Tobacco use 12/06/2018    Gastroesophageal reflux disease 12/06/2018    Lymphocytic colitis 12/06/2018     Results for orders placed or performed during the hospital encounter of 10/26/21   CBC WITH AUTOMATED DIFF   Result Value Ref Range    WBC 4.4 (L) 4.6 - 13.2 K/uL    RBC 4.13 (L) 4.20 - 5.30 M/uL    HGB 13.1 12.0 - 16.0 g/dL    HCT 40.4 35.0 - 45.0 %    MCV 97.8 78.0 - 100.0 FL    MCH 31.7 24.0 - 34.0 PG    MCHC 32.4 31.0 - 37.0 g/dL    RDW 12.0 11.6 - 14.5 %    PLATELET 654 054 - 406 K/uL    MPV 10.2 9.2 - 11.8 FL    NEUTROPHILS 59 40 - 73 %    LYMPHOCYTES 32 21 - 52 %    MONOCYTES 8 3 - 10 %    EOSINOPHILS 0 0 - 5 %    BASOPHILS 1 0 - 2 %    ABS. NEUTROPHILS 2.6 1.8 - 8.0 K/UL    ABS. LYMPHOCYTES 1.4 0.9 - 3.6 K/UL    ABS. MONOCYTES 0.4 0.05 - 1.2 K/UL    ABS. EOSINOPHILS 0.0 0.0 - 0.4 K/UL    ABS.  BASOPHILS 0.0 0.0 - 0.1 K/UL    DF AUTOMATED     LIPID PANEL   Result Value Ref Range    LIPID PROFILE          Cholesterol, total 181 <200 MG/DL    Triglyceride 304 (H) <150 MG/DL    HDL Cholesterol 42 40 - 60 MG/DL    LDL, calculated 78.2 0 - 100 MG/DL    VLDL, calculated 60.8 MG/DL    CHOL/HDL Ratio 4.3 0 - 5.0     METABOLIC PANEL, COMPREHENSIVE   Result Value Ref Range    Sodium 140 136 - 145 mmol/L    Potassium 4.3 3.5 - 5.5 mmol/L    Chloride 106 100 - 111 mmol/L    CO2 28 21 - 32 mmol/L    Anion gap 6 3.0 - 18 mmol/L    Glucose 108 (H) 74 - 99 mg/dL    BUN 14 7.0 - 18 MG/DL    Creatinine 1.00 0.6 - 1.3 MG/DL    BUN/Creatinine ratio 14 12 - 20      GFR est AA >60 >60 ml/min/1.73m2    GFR est non-AA 58 (L) >60 ml/min/1.73m2    Calcium 9.1 8.5 - 10.1 MG/DL    Bilirubin, total 0.5 0.2 - 1.0 MG/DL    ALT (SGPT) 38 13 - 56 U/L    AST (SGOT) 18 10 - 38 U/L    Alk. phosphatase 84 45 - 117 U/L    Protein, total 6.7 6.4 - 8.2 g/dL    Albumin 3.7 3.4 - 5.0 g/dL    Globulin 3.0 2.0 - 4.0 g/dL    A-G Ratio 1.2 0.8 - 1.7     TSH AND FREE T4   Result Value Ref Range    TSH 3.51 0.36 - 3.74 uIU/mL    T4, Free 1.1 0.7 - 1.5 NG/DL     No visits with results within 3 Month(s) from this visit. Latest known visit with results is:   Hospital Outpatient Visit on 10/26/2021   Component Date Value Ref Range Status    WBC 10/26/2021 4.4* 4.6 - 13.2 K/uL Final    RBC 10/26/2021 4.13* 4.20 - 5.30 M/uL Final    HGB 10/26/2021 13.1  12.0 - 16.0 g/dL Final    HCT 10/26/2021 40.4  35.0 - 45.0 % Final    MCV 10/26/2021 97.8  78.0 - 100.0 FL Final    MCH 10/26/2021 31.7  24.0 - 34.0 PG Final    MCHC 10/26/2021 32.4  31.0 - 37.0 g/dL Final    RDW 10/26/2021 12.0  11.6 - 14.5 % Final    PLATELET 79/61/5803 330  135 - 420 K/uL Final    MPV 10/26/2021 10.2  9.2 - 11.8 FL Final    NEUTROPHILS 10/26/2021 59  40 - 73 % Final    LYMPHOCYTES 10/26/2021 32  21 - 52 % Final    MONOCYTES 10/26/2021 8  3 - 10 % Final    EOSINOPHILS 10/26/2021 0  0 - 5 % Final    BASOPHILS 10/26/2021 1  0 - 2 % Final    ABS. NEUTROPHILS 10/26/2021 2.6  1.8 - 8.0 K/UL Final    ABS. LYMPHOCYTES 10/26/2021 1.4  0.9 - 3.6 K/UL Final    ABS. MONOCYTES 10/26/2021 0.4  0.05 - 1.2 K/UL Final    ABS. EOSINOPHILS 10/26/2021 0.0  0.0 - 0.4 K/UL Final    ABS.  BASOPHILS 10/26/2021 0.0  0.0 - 0.1 K/UL Final    DF 10/26/2021 AUTOMATED    Final    LIPID PROFILE 10/26/2021        Final    Cholesterol, total 10/26/2021 181  <200 MG/DL Final    Triglyceride 10/26/2021 304* <150 MG/DL Final    Comment: The drugs N-acetylcysteine (NAC) and  Metamiszole have been found to cause falsely  low results in this chemical assay. Please  be sure to submit blood samples obtained  BEFORE administration of either of these  drugs to assure correct results.  HDL Cholesterol 10/26/2021 42  40 - 60 MG/DL Final    LDL, calculated 10/26/2021 78.2  0 - 100 MG/DL Final    VLDL, calculated 10/26/2021 60.8  MG/DL Final    CHOL/HDL Ratio 10/26/2021 4.3  0 - 5.0   Final    Sodium 10/26/2021 140  136 - 145 mmol/L Final    Potassium 10/26/2021 4.3  3.5 - 5.5 mmol/L Final    Chloride 10/26/2021 106  100 - 111 mmol/L Final    CO2 10/26/2021 28  21 - 32 mmol/L Final    Anion gap 10/26/2021 6  3.0 - 18 mmol/L Final    Glucose 10/26/2021 108* 74 - 99 mg/dL Final    BUN 10/26/2021 14  7.0 - 18 MG/DL Final    Creatinine 10/26/2021 1.00  0.6 - 1.3 MG/DL Final    BUN/Creatinine ratio 10/26/2021 14  12 - 20   Final    GFR est AA 10/26/2021 >60  >60 ml/min/1.73m2 Final    GFR est non-AA 10/26/2021 58* >60 ml/min/1.73m2 Final    Comment: (NOTE)  Estimated GFR is calculated using the Modification of Diet in Renal   Disease (MDRD) Study equation, reported for both  Americans   (GFRAA) and non- Americans (GFRNA), and normalized to 1.73m2   body surface area. The physician must decide which value applies to   the patient. The MDRD study equation should only be used in   individuals age 25 or older. It has not been validated for the   following: pregnant women, patients with serious comorbid conditions,   or on certain medications, or persons with extremes of body size,   muscle mass, or nutritional status.  Calcium 10/26/2021 9.1  8.5 - 10.1 MG/DL Final    Bilirubin, total 10/26/2021 0.5  0.2 - 1.0 MG/DL Final    ALT (SGPT) 10/26/2021 38  13 - 56 U/L Final    AST (SGOT) 10/26/2021 18  10 - 38 U/L Final    Alk.  phosphatase 10/26/2021 84  45 - 117 U/L Final    Protein, total 10/26/2021 6.7  6.4 - 8.2 g/dL Final    Albumin 10/26/2021 3.7  3.4 - 5.0 g/dL Final    Globulin 10/26/2021 3.0  2.0 - 4.0 g/dL Final    A-G Ratio 10/26/2021 1.2  0.8 - 1.7   Final    TSH 10/26/2021 3.51  0.36 - 3.74 uIU/mL Final    T4, Free 10/26/2021 1.1  0.7 - 1.5 NG/DL Final          Follow-up and Dispositions    · Return in about 6 months (around 10/26/2022), or if symptoms worsen or fail to improve, for for annual physical.

## 2022-05-09 ENCOUNTER — OFFICE VISIT (OUTPATIENT)
Dept: NEUROLOGY | Age: 52
End: 2022-05-09
Payer: MEDICAID

## 2022-05-09 VITALS
HEART RATE: 89 BPM | SYSTOLIC BLOOD PRESSURE: 102 MMHG | WEIGHT: 233.8 LBS | OXYGEN SATURATION: 96 % | DIASTOLIC BLOOD PRESSURE: 62 MMHG | BODY MASS INDEX: 38.91 KG/M2 | RESPIRATION RATE: 16 BRPM

## 2022-05-09 DIAGNOSIS — R56.9 SEIZURE (HCC): Primary | ICD-10-CM

## 2022-05-09 PROCEDURE — 99214 OFFICE O/P EST MOD 30 MIN: CPT | Performed by: PSYCHIATRY & NEUROLOGY

## 2022-05-09 NOTE — PROGRESS NOTES
Maureen Hughes is a 46 y.o. female who is in the office as a New Patient    1. Have you been to the ER, urgent care clinic since your last visit? Hospitalized since your last visit? No    2. Have you seen or consulted any other health care providers outside of the [address] [place] [address] since your last visit? Include any pap smears or colon screening.  No

## 2022-05-09 NOTE — PROGRESS NOTES
46year old right handed female referred for evaluation of new onset seizures, patient was with daughter noted to have generalized shaking and passed out and waking up in the ambulance, bit her tongue but no bladder incontinence, patient reported confusion and waking up with headaches taken to hospital and kept for few hours happened 3 weeks ago, patient denies having like this before, no family hoistory of seizures, denies head trauma, patient also report memory issues started 3 weeks ago worse then before, patient had difficulty remembering appointments, get lost not driving anymore, get dizzy with kelly issues,  have lower extremity paresthesias, snore, difficulty sleeping, sleep walking for two weeks, no stroke reported in the past, no new events reported after that, memory is getting worse, get some myoclonic activity every day comes and goes. Social History     Socioeconomic History    Marital status: UNKNOWN     Spouse name: Not on file    Number of children: Not on file    Years of education: Not on file    Highest education level: Not on file   Occupational History    Not on file   Tobacco Use    Smoking status: Current Every Day Smoker     Packs/day: 0.50     Types: Cigarettes    Smokeless tobacco: Current User    Tobacco comment: patient stated she quit three weeks ago cig and now uses vape   Substance and Sexual Activity    Alcohol use: Yes     Comment: occ    Drug use: No    Sexual activity: Not on file   Other Topics Concern    Not on file   Social History Narrative    ** Merged History Encounter **          Social Determinants of Health     Financial Resource Strain:     Difficulty of Paying Living Expenses: Not on file   Food Insecurity:     Worried About Running Out of Food in the Last Year: Not on file    Zeeshan of Food in the Last Year: Not on file   Transportation Needs:     Lack of Transportation (Medical): Not on file    Lack of Transportation (Non-Medical):  Not on file Physical Activity:     Days of Exercise per Week: Not on file    Minutes of Exercise per Session: Not on file   Stress:     Feeling of Stress : Not on file   Social Connections:     Frequency of Communication with Friends and Family: Not on file    Frequency of Social Gatherings with Friends and Family: Not on file    Attends [employer] Services: Not on file    Active Member of [address] [place] [address] or Organizations: Not on file    Attends Club or Organization Meetings: Not on file    Marital Status: Not on file   Intimate Partner Violence:     Fear of Current or Ex-Partner: Not on file    Emotionally Abused: Not on file    Physically Abused: Not on file    Sexually Abused: Not on file   Housing Stability:     Unable to Pay for Housing in the Last Year: Not on file    Number of [place] in the Last Year: Not on file    Unstable Housing in the Last Year: Not on file       Family History   Problem Relation Age of Onset    Heart Disease Mother     Dementia Mother     Heart Disease Father     Heart Disease Sister     Mult Sclerosis Sister     Heart Disease Brother         3 heart attacks    Cancer Paternal Aunt         breast    Breast Cancer Maternal Aunt     Diabetes Neg Hx     Hypertension Neg Hx         Current Outpatient Medications   Medication Sig Dispense Refill    furosemide (LASIX) 20 mg tablet Take 1 Tablet by mouth daily. 30 Tablet 0    omeprazole (PRILOSEC) 20 mg capsule Take 1 capsule by mouth once daily 90 Capsule 0    famotidine (PEPCID) 40 mg tablet Take 1 tablet by mouth once daily 90 Tablet 0    atorvastatin (LIPITOR) 40 mg tablet Take 1 tablet by mouth once daily 90 Tablet 0    albuterol (PROVENTIL HFA, VENTOLIN HFA, PROAIR HFA) 90 mcg/actuation inhaler Albuterol inhaler, 2 puffs up to every 4 hours if needed for wheezing, cough, shortness of breath 1 Each 3    Euthyrox 137 mcg tablet Take 137 mcg by mouth daily.       gabapentin (NEURONTIN) 300 mg capsule Take 300 mg by mouth two (2) times a day. Will be prescribed by rheumatology, Dr. Royer Neil.  propranoloL (INDERAL) 20 mg tablet TAKE 1 TABLET BY MOUTH TWICE DAILY AS NEEDED FOR ANXIETY      chlorproMAZINE (THORAZINE) 200 mg tablet Take 200 mg by mouth nightly.  ALPRAZolam (XANAX) 1 mg tablet TAKE 2 & 1 2 (TWO & ONE HALF) TABLETS BY MOUTH ONCE DAILY AS NEEDED FOR ANXIETY      Trintellix 10 mg tablet TAKE 3 TABLETS BY MOUTH ONCE DAILY      zolpidem CR (AMBIEN CR) 12.5 mg tablet Take 12.5 mg by mouth nightly.  amitriptyline (ELAVIL) 100 mg tablet Take 200 mg by mouth nightly.  acetaminophen (TYLENOL EXTRA STRENGTH) 500 mg tablet Take  by mouth every six (6) hours as needed for Pain.  DULoxetine (CYMBALTA) 60 mg capsule Take 60 mg by mouth daily. Will be prescribed by rheumatology, Dr. Royer Neil.  fluticasone propionate (FLOVENT HFA) 110 mcg/actuation inhaler Take 2 Puffs by inhalation every twelve (12) hours for 90 days. 3 Each 1    ergocalciferol (ERGOCALCIFEROL) 1,250 mcg (50,000 unit) capsule Take 1 Capsule by mouth every seven (7) days for 90 days. 12 Capsule 3       Past Medical History:   Diagnosis Date    Breast cyst     Depression     [facility]     1 year and a half it was mentioned by ER Doc.     Fibromyalgia     Hyperlipidemia     Hypothyroidism     Ovarian cyst        Past Surgical History:   Procedure Laterality Date    HX APPENDECTOMY      HX GYN      Cyst, Uterine Ablation    HX TUBAL LIGATION      IMPLANT BREAST SILICONE/EQ Bilateral     Approximately 1999    MT BREAST SURGERY PROCEDURE UNLISTED         Allergies   Allergen Reactions    Bactrim [Sulfamethoprim] Unknown (comments)    Bactrim [Sulfamethoprim] Other (comments)     Thrush    Nickel Rash       Patient Active Problem List   Diagnosis Code    Current moderate episode of major depressive disorder ([facility] [address] F32.1    Hyperlipidemia E78.5    Acquired hypothyroidism E03.9    Fibromyalgia M79.7    Obesity (BMI 30-39. 9) E66.9    Tobacco use Z72.0    Gastroesophageal reflux disease K21.9    Lymphocytic colitis K52.832    Vitamin D deficiency E55.9    ISA (generalized anxiety disorder) F41.1    PTSD (post-traumatic stress disorder) F43.10    Panic attack F41.0    Chronic fatigue R53.82    Severe obesity (HCC) E66.01    Palpitations R00.2    Nephrolithiasis N20.0    GANT (dyspnea on exertion) R06.00         Review of Systems:   As above      PHYSICAL EXAMINATION:      VITAL SIGNS:    Visit Vitals  /62   Pulse 89   Resp 16   Wt 106.1 kg (233 lb 12.8 oz)   SpO2 96%   BMI 38.91 kg/m²       GENERAL: The patient is well developed, well nourished, and in no apparent distress. EXTREMITIES: No clubbing, cyanosis, or edema is identified. Pulses 2+ and symmetrical.  Muscle tone is normal.  HEAD:   Ear, nose, and throat appear to be without trauma. The patient is normocephalic. NEUROLOGIC EXAMINATION  Mental status: Awake, alert, oriented x3, follows simple, difficulty with world backwards, delayed recall 2/3  Speech and languge: fluent, coherent, naming and repitition intact, reading and comprehension intact  CN: VFF, EOMI, PERRLA, face sensation intact , no facial asymmetry noted, palate elevation symmetric bilat, SS+SCM 5/5 bilat, tongue midline  Motor: no pronator drift, tone normal throughout, strength 5/5 throughout, mild tremors noted upper extremity. Sensory: intact to light touch throughout  Coordination: FNF slow but w/o dysmetria  DTR: 2+ upper and depressed lower, toes downgoing BL  Gait: Difficulty with tandem and heel can walk slow without assistance.     Impression:  46year old right handed female referred for evaluation of new onset seizures, patient was with daughter noted to have generalized shaking and passed out and waking up in the ambulance, bit her tongue but no bladder incontinence, patient reported confusion and waking up with headaches taken to hospital and kept for few hours happened 3 weeks ago, patient denies having like this before, no family hoistory of seizures, denies head trauma, patient also report memory issues started 3 weeks ago worse then before, patient had difficulty remembering appointments, get lost not driving anymore, get dizzy with kelly issues,  have lower extremity paresthesias, snore, difficulty sleeping, sleep walking for two weeks, no stroke reported in the past, no new events reported after that, memory is getting worse, get some myoclonic activity every day comes and goes, mri brain in 2021 was unremarkable, recent ct brain was unremarkable as well. NEW ONSET SEIZURE? Will get EEG. MRI BRAIN. Sleep study. Will follow up after that      Plan:  As above, will follow up after that. I spent 30 minutes with the patient in face-to-face consultation, of which greater than 50% was spent in counseling and coordination of care as described above. PLEASE NOTE:   This document has been produced using voice recognition software. Unrecognized errors in transcription may be present.

## 2022-05-16 ENCOUNTER — HOSPITAL ENCOUNTER (OUTPATIENT)
Dept: NEUROLOGY | Age: 52
Discharge: HOME | End: 2022-05-16
Attending: PSYCHIATRY & NEUROLOGY
Payer: MEDICAID

## 2022-05-16 ENCOUNTER — TELEPHONE (OUTPATIENT)
Dept: FAMILY MEDICINE CLINIC | Age: 52
End: 2022-05-16

## 2022-05-16 DIAGNOSIS — R56.9 SEIZURE (HCC): ICD-10-CM

## 2022-05-16 DIAGNOSIS — M79.89 LEG SWELLING: ICD-10-CM

## 2022-05-16 PROCEDURE — 95819 EEG AWAKE AND ASLEEP: CPT

## 2022-05-17 NOTE — PROCEDURES
[address] [place] [address]   EEG    Name:  Amna Oswald  MR#:   [id number]  :  1970  ACCOUNT #:  [de-identified]  DATE OF SERVICE:  2022      This is a portable EEG    HISTORY/REASON FOR REQUEST:  The EEG is requested to rule out epileptiform activity. CONDITION OF THE RECORDING:  This is a digital EEG performed using digital electrode placed according to the international system of electrode placement. DESCRIPTION OF THE RECORDING:  When the patient is maximally alert, the background activity consists of 7-8 Hz posterior dominant symmetric rhythm which was reactive to eye opening and eye closure. The patient during this recording progresses to wakefulness and drowsiness, but does not attain stage 2 sleep. There was excess theta activity during this recording with some beta mixed as well. Photic did not show any driving response or epileptiform activity. There are no clinical electrographic seizures noted during this recording. IMPRESSION:  This is a borderline normal EEG. There is a slight slowing during this EEG, it could be related to drowsiness or medication effect. No clinical electrographic seizures are noted. Further clinical correlation is suggested.       MD ISAAC [email or web address]  D:  2022 10:22  T:  2022 11:36  JOB #:  [id number]

## 2022-05-18 RX ORDER — FUROSEMIDE 20 MG/1
20 TABLET ORAL DAILY
Qty: 30 TABLET | Refills: 2 | Status: SHIPPED | OUTPATIENT
Start: 2022-05-18

## 2022-05-18 NOTE — TELEPHONE ENCOUNTER
Pt called back in regards to this request stating it was never called in. I'm not sure if Dr Nunzio Sacks was aware of request. Original message was not routed to anyone. Pharmacy on file is confirmed as current pharmacy. Pt prefers to be called at her home number.  Please advise     Future Appointments   Date Time Provider Erica Barron   10/26/2022  3:00 PM MD JÚNIOR RedMA BS AMB

## 2022-06-02 ENCOUNTER — HOSPITAL ENCOUNTER (OUTPATIENT)
Dept: MRI IMAGING | Age: 52
Discharge: HOME | End: 2022-06-02
Attending: PSYCHIATRY & NEUROLOGY
Payer: MEDICAID

## 2022-06-02 DIAGNOSIS — R56.9 SEIZURE (HCC): ICD-10-CM

## 2022-06-02 PROCEDURE — 70551 MRI BRAIN STEM W/O DYE: CPT

## 2022-06-20 ENCOUNTER — OFFICE VISIT (OUTPATIENT)
Dept: FAMILY MEDICINE CLINIC | Age: 52
End: 2022-06-20
Payer: MEDICAID

## 2022-06-20 VITALS
TEMPERATURE: 97.7 F | OXYGEN SATURATION: 94 % | SYSTOLIC BLOOD PRESSURE: 118 MMHG | HEIGHT: 65 IN | WEIGHT: 227 LBS | BODY MASS INDEX: 37.82 KG/M2 | RESPIRATION RATE: 16 BRPM | HEART RATE: 58 BPM | DIASTOLIC BLOOD PRESSURE: 78 MMHG

## 2022-06-20 DIAGNOSIS — R41.3 MEMORY LOSS: ICD-10-CM

## 2022-06-20 DIAGNOSIS — Z09 HOSPITAL DISCHARGE FOLLOW-UP: Primary | ICD-10-CM

## 2022-06-20 DIAGNOSIS — E66.01 SEVERE OBESITY (HCC): ICD-10-CM

## 2022-06-20 DIAGNOSIS — E03.9 ACQUIRED HYPOTHYROIDISM: ICD-10-CM

## 2022-06-20 DIAGNOSIS — R56.9 SEIZURE (HCC): ICD-10-CM

## 2022-06-20 PROCEDURE — 99214 OFFICE O/P EST MOD 30 MIN: CPT | Performed by: LEGAL MEDICINE

## 2022-06-20 RX ORDER — IBUPROFEN 200 MG
1 TABLET ORAL
COMMUNITY
Start: 2022-06-07

## 2022-06-20 RX ORDER — DEXTROAMPHETAMINE SACCHARATE, AMPHETAMINE ASPARTATE MONOHYDRATE, DEXTROAMPHETAMINE SULFATE AND AMPHETAMINE SULFATE 2.5; 2.5; 2.5; 2.5 MG/1; MG/1; MG/1; MG/1
10 CAPSULE, EXTENDED RELEASE ORAL 2 TIMES DAILY
COMMUNITY
Start: 2021-12-30 | End: 2022-06-20 | Stop reason: CLARIF

## 2022-06-20 NOTE — PROGRESS NOTES
Roberto Griffith is a 46 y.o. female (: 1970) presenting to address:    Chief Complaint   Patient presents with   [facility] Follow Up     [facility] for passing out       Vitals:    22 1451   BP: 118/78   Pulse: (!) 58   Resp: 16   Temp: 97.7 °F (36.5 °C)   TempSrc: Temporal   SpO2: 94%   Weight: 227 lb (103 kg)   Height: 5' 5\" (1.651 m)   PainSc:   0 - No pain       Hearing/Vision:   No exam data present    Learning Assessment:     Learning Assessment 2018   PRIMARY LEARNER Patient   HIGHEST LEVEL OF EDUCATION - PRIMARY LEARNER  DID NOT GRADUATE HIGH SCHOOL   BARRIERS PRIMARY LEARNER NONE   CO-LEARNER CAREGIVER No   PRIMARY LANGUAGE ENGLISH   LEARNER PREFERENCE PRIMARY LISTENING   ANSWERED BY patient   RELATIONSHIP SELF     Depression Screening:     3 most recent PHQ Screens 2022   Little interest or pleasure in doing things Not at all   Feeling down, depressed, irritable, or hopeless Not at all   Total Score PHQ 2 0   Trouble falling or staying asleep, or sleeping too much -   Feeling tired or having little energy -   Poor appetite, weight loss, or overeating -   Feeling bad about yourself - or that you are a failure or have let yourself or your family down -   Trouble concentrating on things such as school, work, reading, or watching TV -   Moving or speaking so slowly that other people could have noticed; or the opposite being so fidgety that others notice -   Thoughts of being better off dead, or hurting yourself in some way -   PHQ 9 Score -   How difficult have these problems made it for you to do your work, take care of your home and get along with others -     Fall Risk Assessment:     Fall Risk Assessment, last 12 mths 2022   Able to walk? Yes   Fall in past 12 months? 0   Do you feel unsteady? 0   Are you worried about falling 0     Abuse Screening:     Abuse Screening Questionnaire 2022   Do you ever feel afraid of your partner?  N   Are you in a relationship with someone who physically or mentally threatens you? N   Is it safe for you to go home? Y     ADL Assessment:   No flowsheet data found. Coordination of Care Questionaire:   1. \"Have you been to the ER, urgent care clinic since your last visit? Hospitalized since your last visit? \" [facility] for passing out    2. \"Have you seen or consulted any other health care providers outside of the [address] [place] [address] since your last visit? \" No     3. For patients aged 39-70: Has the patient had a colonoscopy / FIT/ Cologuard? No    If the patient is female:    4. For patients aged 41-77: Has the patient had a mammogram within the past 2 years? No  See top three    5. For patients aged 21-65: Has the patient had a pap smear? No    Advanced Directive:   1. Do you have an Advanced Directive? NO    2. Would you like information on Advanced Directives?  NO

## 2022-06-20 NOTE — PROGRESS NOTES
Juan Pablo Nair     Chief Complaint   Patient presents with   [facility] Follow Up     [facility] for passing out     Vitals:    06/20/22 1451   BP: 118/78   Pulse: (!) 58   Resp: 16   Temp: 97.7 °F (36.5 °C)   TempSrc: Temporal   SpO2: 94%   Weight: 227 lb (103 kg)   Height: 5' 5\" (1.651 m)   PainSc:   0 - No pain         HPI: is here for follow up from hospital admission 6/5 to 6/7, hospital note has been reviewed, she had an MRI done of the brain as well as EEG, blood work has been done and reviewed which no significant abnormality    Patient is talking multiple psychiatric medication and she decided on her own to stop some of the medication  She stopped Thorazine , Trintellix  She is currently taking Cymbalta, amitriptyline, Adderall, and Xanax only as needed. Has not had any seizures since discharge from the hospital        Suspected  Sleep  Apnea referral was sent by neurologist will contact neurologist office for further assistance scheduling sleep apnea    I have reviewed the note from Dr. Jermain Brown      Past Medical History:   Diagnosis Date    Breast cyst     Depression     Emphysema lung ([facility] [address]     1 year and a half it was mentioned by ER Doc.     Fibromyalgia     Hyperlipidemia     Hypothyroidism     Ovarian cyst      Past Surgical History:   Procedure Laterality Date    HX APPENDECTOMY      HX GYN      Cyst, Uterine Ablation    HX TUBAL LIGATION      IMPLANT BREAST SILICONE/EQ Bilateral     Approximately 1999    IA BREAST SURGERY PROCEDURE UNLISTED       Social History     Tobacco Use    Smoking status: Current Every Day Smoker     Packs/day: 0.50     Types: Cigarettes    Smokeless tobacco: Current User    Tobacco comment: patient stated she quit three weeks ago cig and now uses vape   Substance Use Topics    Alcohol use: Yes     Comment: occ       Family History   Problem Relation Age of Onset    Heart Disease Mother     Dementia Mother     Heart Disease Father     Heart Disease Sister     Mult Sclerosis Sister     Heart Disease Brother         3 heart attacks    Cancer Paternal Aunt         breast    Breast Cancer Maternal Aunt     Diabetes Neg Hx     Hypertension Neg Hx        Review of Systems   Constitutional: Negative for chills, fever, malaise/fatigue and weight loss. HENT: Negative for congestion, ear discharge, ear pain, hearing loss, nosebleeds, sinus pain and sore throat. Eyes: Negative for blurred vision, double vision and discharge. Respiratory: Negative for cough, hemoptysis, sputum production, shortness of breath, wheezing and stridor. Cardiovascular: Negative for chest pain, palpitations, claudication and leg swelling. Gastrointestinal: Negative for abdominal pain, constipation, diarrhea, nausea and vomiting. Genitourinary: Negative for dysuria, frequency and urgency. Musculoskeletal: Negative for back pain, falls, joint pain, myalgias and neck pain. Skin: Negative for itching and rash. Neurological: Negative for dizziness, tingling, sensory change, speech change, focal weakness, weakness and headaches. Psychiatric/Behavioral: Negative for depression, hallucinations, substance abuse and suicidal ideas. The patient is not nervous/anxious. Physical Exam  Vitals and nursing note reviewed. Constitutional:       General: She is not in acute distress. Appearance: She is well-developed. She is not diaphoretic. HENT:      Head: Normocephalic and atraumatic. Eyes:      General: No scleral icterus. Right eye: No discharge. Left eye: No discharge. Neck:      Thyroid: No thyromegaly. Cardiovascular:      Rate and Rhythm: Normal rate and regular rhythm. Heart sounds: Normal heart sounds. Pulmonary:      Effort: Pulmonary effort is normal. No respiratory distress. Breath sounds: Normal breath sounds. No wheezing or rales. Chest:      Chest wall: No tenderness. Abdominal:      General: There is no distension. Palpations: Abdomen is soft. Tenderness: There is no abdominal tenderness. There is no rebound. Musculoskeletal:         General: No tenderness or deformity. Normal range of motion. Right lower leg: No edema. Left lower leg: No edema. Lymphadenopathy:      Cervical: No cervical adenopathy. Skin:     General: Skin is warm and dry. Coloration: Skin is not pale. Findings: No erythema or rash. Neurological:      Mental Status: She is alert and oriented to person, place, and time. Cranial Nerves: No cranial nerve deficit. Coordination: Coordination normal.   Psychiatric:         Behavior: Behavior normal.         Thought Content: Thought content normal.         Judgment: Judgment normal.          Assessment and plan     Plan of care has been discussed with the patient, he agrees to the plan and verbalized understanding. All his questions were answered  More than 50% of the time spent in this visit was counseling the patient about  illness and treatment options         1. Hospital discharge follow-up  Hospital notes and result has been reviewed    2. Acquired hypothyroidism    She is adherent to medications  Thyroid function is within normal limits  3. Seizure [facility])  Patient EEG is negative she will need to follow-up with neurologist [place]   Symptoms has improved after stopping some of her psychiatric medications      4. Memory loss      5. Severe obesity ([facility] [address]  Lifestyle modification has been discussed with patient in details, decrease carbohydrates, decrease or eliminate sugar intake, gradually increase physical activity as tolerated to be about 4 to 5 hours a week. Current Outpatient Medications   Medication Sig Dispense Refill    nicotine (NICODERM CQ) 14 mg/24 hr patch 1 Patch by TransDERmal route.  furosemide (LASIX) 20 mg tablet Take 1 Tablet by mouth daily.  30 Tablet 2    omeprazole (PRILOSEC) 20 mg capsule Take 1 capsule by mouth once daily 90 Capsule 0  famotidine (PEPCID) 40 mg tablet Take 1 tablet by mouth once daily 90 Tablet 0    atorvastatin (LIPITOR) 40 mg tablet Take 1 tablet by mouth once daily 90 Tablet 0    albuterol (PROVENTIL HFA, VENTOLIN HFA, PROAIR HFA) 90 mcg/actuation inhaler Albuterol inhaler, 2 puffs up to every 4 hours if needed for wheezing, cough, shortness of breath 1 Each 3    fluticasone propionate (FLOVENT HFA) 110 mcg/actuation inhaler Take 2 Puffs by inhalation every twelve (12) hours for 90 days. 3 Each 1    Euthyrox 137 mcg tablet Take 137 mcg by mouth daily.  ergocalciferol (ERGOCALCIFEROL) 1,250 mcg (50,000 unit) capsule Take 1 Capsule by mouth every seven (7) days for 90 days. 12 Capsule 3    gabapentin (NEURONTIN) 300 mg capsule Take 300 mg by mouth two (2) times a day. Will be prescribed by rheumatology, Dr. Joseph Barnhart.  ALPRAZolam (XANAX) 1 mg tablet TAKE 2 & 1 2 (TWO & ONE HALF) TABLETS BY MOUTH ONCE DAILY AS NEEDED FOR ANXIETY      amitriptyline (ELAVIL) 100 mg tablet Take 200 mg by mouth nightly.  acetaminophen (TYLENOL EXTRA STRENGTH) 500 mg tablet Take  by mouth every six (6) hours as needed for Pain.  DULoxetine (CYMBALTA) 60 mg capsule Take 60 mg by mouth daily. Will be prescribed by rheumatology, Dr. Joseph Barnhart.  Trintellix 10 mg tablet TAKE 3 TABLETS BY MOUTH ONCE DAILY (Patient not taking: Reported on 6/20/2022)      zolpidem CR (AMBIEN CR) 12.5 mg tablet Take 10 mg by mouth nightly.          Patient Active Problem List    Diagnosis Date Noted    Palpitations 10/27/2020    Nephrolithiasis 10/27/2020    GANT (dyspnea on exertion) 10/27/2020    Severe obesity ([address] 08/07/2020    ISA (generalized anxiety disorder) 05/12/2020    PTSD (post-traumatic stress disorder) 05/12/2020    Panic attack 05/12/2020    Chronic fatigue 05/12/2020    Vitamin D deficiency 03/30/2019    Current moderate episode of major depressive disorder ([address] 12/06/2018    Hyperlipidemia 12/06/2018    Acquired hypothyroidism 12/06/2018    Fibromyalgia 12/06/2018    Obesity (BMI 30-39.9) 12/06/2018    Tobacco use 12/06/2018    Gastroesophageal reflux disease 12/06/2018    Lymphocytic colitis 12/06/2018     Results for orders placed or performed during the hospital encounter of 10/26/21   CBC WITH AUTOMATED DIFF   Result Value Ref Range    WBC 4.4 (L) 4.6 - 13.2 K/uL    RBC 4.13 (L) 4.20 - 5.30 M/uL    HGB 13.1 12.0 - 16.0 g/dL    HCT 40.4 35.0 - 45.0 %    MCV 97.8 78.0 - 100.0 FL    MCH 31.7 24.0 - 34.0 PG    MCHC 32.4 31.0 - 37.0 g/dL    RDW 12.0 11.6 - 14.5 %    PLATELET 398 255 - 743 K/uL    MPV 10.2 9.2 - 11.8 FL    NEUTROPHILS 59 40 - 73 %    LYMPHOCYTES 32 21 - 52 %    MONOCYTES 8 3 - 10 %    EOSINOPHILS 0 0 - 5 %    BASOPHILS 1 0 - 2 %    ABS. NEUTROPHILS 2.6 1.8 - 8.0 K/UL    ABS. LYMPHOCYTES 1.4 0.9 - 3.6 K/UL    ABS. MONOCYTES 0.4 0.05 - 1.2 K/UL    ABS. EOSINOPHILS 0.0 0.0 - 0.4 K/UL    ABS. BASOPHILS 0.0 0.0 - 0.1 K/UL    DF AUTOMATED     LIPID PANEL   Result Value Ref Range    LIPID PROFILE          Cholesterol, total 181 <200 MG/DL    Triglyceride 304 (H) <150 MG/DL    HDL Cholesterol 42 40 - 60 MG/DL    LDL, calculated 78.2 0 - 100 MG/DL    VLDL, calculated 60.8 MG/DL    CHOL/HDL Ratio 4.3 0 - 5.0     METABOLIC PANEL, COMPREHENSIVE   Result Value Ref Range    Sodium 140 136 - 145 mmol/L    Potassium 4.3 3.5 - 5.5 mmol/L    Chloride 106 100 - 111 mmol/L    CO2 28 21 - 32 mmol/L    Anion gap 6 3.0 - 18 mmol/L    Glucose 108 (H) 74 - 99 mg/dL    BUN 14 7.0 - 18 MG/DL    Creatinine 1.00 0.6 - 1.3 MG/DL    BUN/Creatinine ratio 14 12 - 20      GFR est AA >60 >60 ml/min/1.73m2    GFR est non-AA 58 (L) >60 ml/min/1.73m2    Calcium 9.1 8.5 - 10.1 MG/DL    Bilirubin, total 0.5 0.2 - 1.0 MG/DL    ALT (SGPT) 38 13 - 56 U/L    AST (SGOT) 18 10 - 38 U/L    Alk.  phosphatase 84 45 - 117 U/L    Protein, total 6.7 6.4 - 8.2 g/dL    Albumin 3.7 3.4 - 5.0 g/dL    Globulin 3.0 2.0 - 4.0 g/dL    A-G Ratio 1.2 0.8 - 1.7 TSH AND FREE T4   Result Value Ref Range    TSH 3.51 0.36 - 3.74 uIU/mL    T4, Free 1.1 0.7 - 1.5 NG/DL     No visits with results within 3 Month(s) from this visit. Latest known visit with results is:   Hospital Outpatient Visit on 10/26/2021   Component Date Value Ref Range Status    WBC 10/26/2021 4.4* 4.6 - 13.2 K/uL Final    RBC 10/26/2021 4.13* 4.20 - 5.30 M/uL Final    HGB 10/26/2021 13.1  12.0 - 16.0 g/dL Final    HCT 10/26/2021 40.4  35.0 - 45.0 % Final    MCV 10/26/2021 97.8  78.0 - 100.0 FL Final    MCH 10/26/2021 31.7  24.0 - 34.0 PG Final    MCHC 10/26/2021 32.4  31.0 - 37.0 g/dL Final    RDW 10/26/2021 12.0  11.6 - 14.5 % Final    PLATELET 25/67/1712 245  135 - 420 K/uL Final    MPV 10/26/2021 10.2  9.2 - 11.8 FL Final    NEUTROPHILS 10/26/2021 59  40 - 73 % Final    LYMPHOCYTES 10/26/2021 32  21 - 52 % Final    MONOCYTES 10/26/2021 8  3 - 10 % Final    EOSINOPHILS 10/26/2021 0  0 - 5 % Final    BASOPHILS 10/26/2021 1  0 - 2 % Final    ABS. NEUTROPHILS 10/26/2021 2.6  1.8 - 8.0 K/UL Final    ABS. LYMPHOCYTES 10/26/2021 1.4  0.9 - 3.6 K/UL Final    ABS. MONOCYTES 10/26/2021 0.4  0.05 - 1.2 K/UL Final    ABS. EOSINOPHILS 10/26/2021 0.0  0.0 - 0.4 K/UL Final    ABS. BASOPHILS 10/26/2021 0.0  0.0 - 0.1 K/UL Final    DF 10/26/2021 AUTOMATED    Final    LIPID PROFILE 10/26/2021        Final    Cholesterol, total 10/26/2021 181  <200 MG/DL Final    Triglyceride 10/26/2021 304* <150 MG/DL Final    Comment: The drugs N-acetylcysteine (NAC) and  Metamiszole have been found to cause falsely  low results in this chemical assay. Please  be sure to submit blood samples obtained  BEFORE administration of either of these  drugs to assure correct results.       HDL Cholesterol 10/26/2021 42  40 - 60 MG/DL Final    LDL, calculated 10/26/2021 78.2  0 - 100 MG/DL Final    VLDL, calculated 10/26/2021 60.8  MG/DL Final    CHOL/HDL Ratio 10/26/2021 4.3  0 - 5.0   Final    Sodium 10/26/2021 140 136 - 145 mmol/L Final    Potassium 10/26/2021 4.3  3.5 - 5.5 mmol/L Final    Chloride 10/26/2021 106  100 - 111 mmol/L Final    CO2 10/26/2021 28  21 - 32 mmol/L Final    Anion gap 10/26/2021 6  3.0 - 18 mmol/L Final    Glucose 10/26/2021 108* 74 - 99 mg/dL Final    BUN 10/26/2021 14  7.0 - 18 MG/DL Final    Creatinine 10/26/2021 1.00  0.6 - 1.3 MG/DL Final    BUN/Creatinine ratio 10/26/2021 14  12 - 20   Final    GFR est AA 10/26/2021 >60  >60 ml/min/1.73m2 Final    GFR est non-AA 10/26/2021 58* >60 ml/min/1.73m2 Final    Comment: (NOTE)  Estimated GFR is calculated using the Modification of Diet in Renal   Disease (MDRD) Study equation, reported for both  Americans   (GFRAA) and non- Americans (GFRNA), and normalized to 1.73m2   body surface area. The physician must decide which value applies to   the patient. The MDRD study equation should only be used in   individuals age 25 or older. It has not been validated for the   following: pregnant women, patients with serious comorbid conditions,   or on certain medications, or persons with extremes of body size,   muscle mass, or nutritional status.  Calcium 10/26/2021 9.1  8.5 - 10.1 MG/DL Final    Bilirubin, total 10/26/2021 0.5  0.2 - 1.0 MG/DL Final    ALT (SGPT) 10/26/2021 38  13 - 56 U/L Final    AST (SGOT) 10/26/2021 18  10 - 38 U/L Final    Alk.  phosphatase 10/26/2021 84  45 - 117 U/L Final    Protein, total 10/26/2021 6.7  6.4 - 8.2 g/dL Final    Albumin 10/26/2021 3.7  3.4 - 5.0 g/dL Final    Globulin 10/26/2021 3.0  2.0 - 4.0 g/dL Final    A-G Ratio 10/26/2021 1.2  0.8 - 1.7   Final    TSH 10/26/2021 3.51  0.36 - 3.74 uIU/mL Final    T4, Free 10/26/2021 1.1  0.7 - 1.5 NG/DL Final          Follow-up and Dispositions    · Return in about 4 months (around 10/20/2022) for for annual physical.

## 2022-07-19 DIAGNOSIS — K21.9 GASTROESOPHAGEAL REFLUX DISEASE WITHOUT ESOPHAGITIS: ICD-10-CM

## 2022-07-19 RX ORDER — FAMOTIDINE 40 MG/1
TABLET, FILM COATED ORAL
Qty: 90 TABLET | Refills: 0 | Status: SHIPPED | OUTPATIENT
Start: 2022-07-19

## 2022-07-19 RX ORDER — OMEPRAZOLE 20 MG/1
CAPSULE, DELAYED RELEASE ORAL
Qty: 90 CAPSULE | Refills: 0 | Status: SHIPPED | OUTPATIENT
Start: 2022-07-19

## 2022-07-25 ENCOUNTER — TELEPHONE (OUTPATIENT)
Dept: FAMILY MEDICINE CLINIC | Age: 52
End: 2022-07-25

## 2022-07-25 DIAGNOSIS — E03.9 ACQUIRED HYPOTHYROIDISM: Primary | ICD-10-CM

## 2022-07-25 RX ORDER — LEVOTHYROXINE SODIUM 137 UG/1
137 TABLET ORAL DAILY
Qty: 90 TABLET | Refills: 1 | Status: SHIPPED | OUTPATIENT
Start: 2022-07-25 | End: 2022-10-23

## 2022-07-25 NOTE — TELEPHONE ENCOUNTER
Pt called to request a refill on her levothyroxine medication. I do not see that as a current med however I see that it was last prescribed by Dr Mary English on 6/22/2021. It states it was discontinued for dosage increase but I don't see the increased med in chart. She states it's either 136mcg or 137mcg. Please advise.

## 2022-10-05 DIAGNOSIS — E78.5 HYPERLIPIDEMIA, UNSPECIFIED HYPERLIPIDEMIA TYPE: ICD-10-CM

## 2022-10-07 RX ORDER — ATORVASTATIN CALCIUM 40 MG/1
TABLET, FILM COATED ORAL
Qty: 90 TABLET | Refills: 0 | Status: SHIPPED | OUTPATIENT
Start: 2022-10-07

## 2022-10-21 ENCOUNTER — TELEPHONE (OUTPATIENT)
Dept: FAMILY MEDICINE CLINIC | Age: 52
End: 2022-10-21

## 2022-11-06 ENCOUNTER — TELEPHONE (OUTPATIENT)
Dept: FAMILY MEDICINE CLINIC | Age: 52
End: 2022-11-06

## 2022-11-06 NOTE — TELEPHONE ENCOUNTER
Received results of abnormal mammogram that is incomplete need further evaluation visit patient schedule her diagnostic appointments? ?

## 2022-11-14 DIAGNOSIS — E55.9 VITAMIN D DEFICIENCY: ICD-10-CM

## 2022-11-14 DIAGNOSIS — K21.9 GASTROESOPHAGEAL REFLUX DISEASE WITHOUT ESOPHAGITIS: ICD-10-CM

## 2022-11-14 DIAGNOSIS — E78.5 HYPERLIPIDEMIA, UNSPECIFIED HYPERLIPIDEMIA TYPE: ICD-10-CM

## 2022-11-15 RX ORDER — ERGOCALCIFEROL 1.25 MG/1
CAPSULE ORAL
Qty: 4 CAPSULE | Refills: 0 | Status: SHIPPED | OUTPATIENT
Start: 2022-11-15

## 2022-11-15 RX ORDER — ATORVASTATIN CALCIUM 40 MG/1
TABLET, FILM COATED ORAL
Qty: 90 TABLET | Refills: 0 | Status: SHIPPED | OUTPATIENT
Start: 2022-11-15

## 2022-11-15 RX ORDER — FAMOTIDINE 40 MG/1
TABLET, FILM COATED ORAL
Qty: 90 TABLET | Refills: 0 | Status: SHIPPED | OUTPATIENT
Start: 2022-11-15

## 2022-11-21 ENCOUNTER — OFFICE VISIT (OUTPATIENT)
Dept: FAMILY MEDICINE CLINIC | Age: 52
End: 2022-11-21
Payer: MEDICAID

## 2022-11-21 ENCOUNTER — APPOINTMENT (OUTPATIENT)
Dept: FAMILY MEDICINE CLINIC | Age: 52
End: 2022-11-21

## 2022-11-21 ENCOUNTER — HOSPITAL ENCOUNTER (OUTPATIENT)
Dept: LAB | Age: 52
Discharge: HOME | End: 2022-11-21
Payer: MEDICAID

## 2022-11-21 VITALS
HEART RATE: 89 BPM | DIASTOLIC BLOOD PRESSURE: 76 MMHG | TEMPERATURE: 98.7 F | OXYGEN SATURATION: 98 % | BODY MASS INDEX: 32.65 KG/M2 | HEIGHT: 65 IN | WEIGHT: 196 LBS | RESPIRATION RATE: 16 BRPM | SYSTOLIC BLOOD PRESSURE: 110 MMHG

## 2022-11-21 DIAGNOSIS — M79.7 FIBROMYALGIA: ICD-10-CM

## 2022-11-21 DIAGNOSIS — R56.9 SEIZURE (HCC): ICD-10-CM

## 2022-11-21 DIAGNOSIS — E55.9 VITAMIN D DEFICIENCY: ICD-10-CM

## 2022-11-21 DIAGNOSIS — Z00.00 ANNUAL PHYSICAL EXAM: Primary | ICD-10-CM

## 2022-11-21 DIAGNOSIS — Z13.1 SCREENING FOR DIABETES MELLITUS (DM): ICD-10-CM

## 2022-11-21 DIAGNOSIS — E03.9 ACQUIRED HYPOTHYROIDISM: ICD-10-CM

## 2022-11-21 DIAGNOSIS — E78.5 HYPERLIPIDEMIA, UNSPECIFIED HYPERLIPIDEMIA TYPE: ICD-10-CM

## 2022-11-21 DIAGNOSIS — R79.89 ABNORMAL LFTS: ICD-10-CM

## 2022-11-21 DIAGNOSIS — Z23 NEEDS FLU SHOT: ICD-10-CM

## 2022-11-21 DIAGNOSIS — Z00.00 ANNUAL PHYSICAL EXAM: ICD-10-CM

## 2022-11-21 DIAGNOSIS — E66.09 CLASS 1 OBESITY DUE TO EXCESS CALORIES WITHOUT SERIOUS COMORBIDITY WITH BODY MASS INDEX (BMI) OF 32.0 TO 32.9 IN ADULT: ICD-10-CM

## 2022-11-21 LAB
25(OH)D3 SERPL-MCNC: 86.8 NG/ML (ref 30–100)
ALBUMIN SERPL-MCNC: 3.9 G/DL (ref 3.4–5)
ALBUMIN/GLOB SERPL: 1.3 {RATIO} (ref 0.8–1.7)
ALP SERPL-CCNC: 74 U/L (ref 45–117)
ALT SERPL-CCNC: 18 U/L (ref 13–56)
ANION GAP SERPL CALC-SCNC: 4 MMOL/L (ref 3–18)
APPEARANCE UR: CLEAR
AST SERPL-CCNC: 7 U/L (ref 10–38)
BACTERIA URNS QL MICRO: NEGATIVE /HPF
BASOPHILS # BLD: 0.1 K/UL (ref 0–0.1)
BASOPHILS NFR BLD: 1 % (ref 0–2)
BILIRUB DIRECT SERPL-MCNC: 0.1 MG/DL (ref 0–0.2)
BILIRUB SERPL-MCNC: 0.4 MG/DL (ref 0.2–1)
BILIRUB UR QL: NEGATIVE
BUN SERPL-MCNC: 11 MG/DL (ref 7–18)
BUN/CREAT SERPL: 12 (ref 12–20)
CALCIUM SERPL-MCNC: 9.4 MG/DL (ref 8.5–10.1)
CHLORIDE SERPL-SCNC: 104 MMOL/L (ref 100–111)
CHOLEST SERPL-MCNC: 139 MG/DL
CO2 SERPL-SCNC: 30 MMOL/L (ref 21–32)
COLOR UR: YELLOW
CREAT SERPL-MCNC: 0.89 MG/DL (ref 0.6–1.3)
DIFFERENTIAL METHOD BLD: NORMAL
EOSINOPHIL # BLD: 0.1 K/UL (ref 0–0.4)
EOSINOPHIL NFR BLD: 2 % (ref 0–5)
EPITH CASTS URNS QL MICRO: NORMAL /LPF (ref 0–5)
ERYTHROCYTE [DISTWIDTH] IN BLOOD BY AUTOMATED COUNT: 12.9 % (ref 11.6–14.5)
EST. AVERAGE GLUCOSE BLD GHB EST-MCNC: 114 MG/DL
GLOBULIN SER CALC-MCNC: 2.9 G/DL (ref 2–4)
GLUCOSE SERPL-MCNC: 105 MG/DL (ref 74–99)
GLUCOSE UR STRIP.AUTO-MCNC: NEGATIVE MG/DL
HBA1C MFR BLD: 5.6 % (ref 4.2–5.6)
HCT VFR BLD AUTO: 40.4 % (ref 35–45)
HDLC SERPL-MCNC: 43 MG/DL (ref 40–60)
HDLC SERPL: 3.2 {RATIO} (ref 0–5)
HGB BLD-MCNC: 13.4 G/DL (ref 12–16)
HGB UR QL STRIP: NEGATIVE
IMM GRANULOCYTES # BLD AUTO: 0 K/UL (ref 0–0.04)
IMM GRANULOCYTES NFR BLD AUTO: 0 % (ref 0–0.5)
KETONES UR QL STRIP.AUTO: NEGATIVE MG/DL
LDLC SERPL CALC-MCNC: 68 MG/DL (ref 0–100)
LEUKOCYTE ESTERASE UR QL STRIP.AUTO: ABNORMAL
LIPID PROFILE,FLP: NORMAL
LYMPHOCYTES # BLD: 2.2 K/UL (ref 0.9–3.6)
LYMPHOCYTES NFR BLD: 32 % (ref 21–52)
MCH RBC QN AUTO: 31.5 PG (ref 24–34)
MCHC RBC AUTO-ENTMCNC: 33.2 G/DL (ref 31–37)
MCV RBC AUTO: 94.8 FL (ref 78–100)
MONOCYTES # BLD: 0.4 K/UL (ref 0.05–1.2)
MONOCYTES NFR BLD: 5 % (ref 3–10)
NEUTS SEG # BLD: 4.3 K/UL (ref 1.8–8)
NEUTS SEG NFR BLD: 61 % (ref 40–73)
NITRITE UR QL STRIP.AUTO: NEGATIVE
NRBC # BLD: 0 K/UL (ref 0–0.01)
NRBC BLD-RTO: 0 PER 100 WBC
PH UR STRIP: 5 [PH] (ref 5–8)
PLATELET # BLD AUTO: 232 K/UL (ref 135–420)
PMV BLD AUTO: 11.1 FL (ref 9.2–11.8)
POTASSIUM SERPL-SCNC: 4.5 MMOL/L (ref 3.5–5.5)
PROT SERPL-MCNC: 6.8 G/DL (ref 6.4–8.2)
PROT UR STRIP-MCNC: NEGATIVE MG/DL
RBC # BLD AUTO: 4.26 M/UL (ref 4.2–5.3)
RBC #/AREA URNS HPF: NORMAL /HPF (ref 0–5)
SODIUM SERPL-SCNC: 138 MMOL/L (ref 136–145)
SP GR UR REFRACTOMETRY: 1.02 (ref 1–1.03)
TRIGL SERPL-MCNC: 140 MG/DL (ref ?–150)
TSH SERPL DL<=0.05 MIU/L-ACNC: 3.84 UIU/ML (ref 0.36–3.74)
URATE CRY URNS QL MICRO: NORMAL
UROBILINOGEN UR QL STRIP.AUTO: 0.2 EU/DL (ref 0.2–1)
VLDLC SERPL CALC-MCNC: 28 MG/DL
WBC # BLD AUTO: 7.1 K/UL (ref 4.6–13.2)
WBC URNS QL MICRO: NORMAL /HPF (ref 0–4)
YEAST BUDDING URNS QL: NORMAL

## 2022-11-21 PROCEDURE — 80076 HEPATIC FUNCTION PANEL: CPT

## 2022-11-21 PROCEDURE — 83036 HEMOGLOBIN GLYCOSYLATED A1C: CPT

## 2022-11-21 PROCEDURE — 85025 COMPLETE CBC W/AUTO DIFF WBC: CPT

## 2022-11-21 PROCEDURE — 36415 COLL VENOUS BLD VENIPUNCTURE: CPT

## 2022-11-21 PROCEDURE — 99396 PREV VISIT EST AGE 40-64: CPT | Performed by: LEGAL MEDICINE

## 2022-11-21 PROCEDURE — 84443 ASSAY THYROID STIM HORMONE: CPT

## 2022-11-21 PROCEDURE — 90686 IIV4 VACC NO PRSV 0.5 ML IM: CPT | Performed by: LEGAL MEDICINE

## 2022-11-21 PROCEDURE — 80061 LIPID PANEL: CPT

## 2022-11-21 PROCEDURE — 82306 VITAMIN D 25 HYDROXY: CPT

## 2022-11-21 PROCEDURE — 81001 URINALYSIS AUTO W/SCOPE: CPT

## 2022-11-21 PROCEDURE — 80048 BASIC METABOLIC PNL TOTAL CA: CPT

## 2022-11-21 RX ORDER — DEXTROAMPHETAMINE SULFATE, DEXTROAMPHETAMINE SACCHARATE, AMPHETAMINE SULFATE AND AMPHETAMINE ASPARTATE 3.75; 3.75; 3.75; 3.75 MG/1; MG/1; MG/1; MG/1
15 CAPSULE, EXTENDED RELEASE ORAL 2 TIMES DAILY
COMMUNITY
Start: 2022-11-14

## 2022-11-21 RX ORDER — TEMAZEPAM 30 MG/1
CAPSULE ORAL
COMMUNITY
Start: 2022-11-18

## 2022-11-21 NOTE — PROGRESS NOTES
Pablo Odne is a 45 y.o. female (: 1970) presenting to address:    Chief Complaint   Patient presents with    Physical     Flu Immunization/s administered 2022 by Shruthi Cummings with consent. Patient tolerated procedure well. No reactions noted. Vitals:    22 1430   BP: 110/76   Pulse: 89   Resp: 16   Temp: 98.7 °F (37.1 °C)   TempSrc: Temporal   SpO2: 98%   Weight: 196 lb (88.9 kg)   Height: 5' 5\" (1.651 m)   PainSc:   7   PainLoc: Generalized       Hearing/Vision:   No results found. Learning Assessment:     Learning Assessment 2018   PRIMARY LEARNER Patient   HIGHEST LEVEL OF EDUCATION - PRIMARY LEARNER  DID NOT GRADUATE HIGH SCHOOL   BARRIERS PRIMARY LEARNER NONE   CO-LEARNER CAREGIVER No   PRIMARY LANGUAGE ENGLISH   LEARNER PREFERENCE PRIMARY LISTENING   ANSWERED BY patient   RELATIONSHIP SELF     Depression Screening:     3 most recent PHQ Screens 2022   Little interest or pleasure in doing things Not at all   Feeling down, depressed, irritable, or hopeless Not at all   Total Score PHQ 2 0   Trouble falling or staying asleep, or sleeping too much -   Feeling tired or having little energy -   Poor appetite, weight loss, or overeating -   Feeling bad about yourself - or that you are a failure or have let yourself or your family down -   Trouble concentrating on things such as school, work, reading, or watching TV -   Moving or speaking so slowly that other people could have noticed; or the opposite being so fidgety that others notice -   Thoughts of being better off dead, or hurting yourself in some way -   PHQ 9 Score -   How difficult have these problems made it for you to do your work, take care of your home and get along with others -     Fall Risk Assessment:     Fall Risk Assessment, last 12 mths 2022   Able to walk? Yes   Fall in past 12 months? 1   Do you feel unsteady?  1   Are you worried about falling 1   Is the gait abnormal? 0   Number of falls in past 12 months 2   Fall with injury? 0     Abuse Screening:     Abuse Screening Questionnaire 6/20/2022   Do you ever feel afraid of your partner? N   Are you in a relationship with someone who physically or mentally threatens you? N   Is it safe for you to go home? Y     ADL Assessment:   No flowsheet data found. Coordination of Care Questionaire:   1. \"Have you been to the ER, urgent care clinic since your last visit? Hospitalized since your last visit? \" No    2. \"Have you seen or consulted any other health care providers outside of the [address] [place] [address] since your last visit? \" No     3. For patients aged 39-70: Has the patient had a colonoscopy / FIT/ Cologuard? Yes - no Care Gap present      If the patient is female:    4. For patients aged 41-77: Has the patient had a mammogram within the past 2 years? Yes - no Care Gap present  See top three    5. For patients aged 21-65: Has the patient had a pap smear? Yes - no Care Gap present    Advanced Directive:   1. Do you have an Advanced Directive? NO    2. Would you like information on Advanced Directives?  NO

## 2022-11-21 NOTE — PROGRESS NOTES
Mary Titi     Chief Complaint   Patient presents with    Physical     Vitals:    11/21/22 1430   BP: 110/76   Pulse: 89   Resp: 16   Temp: 98.7 °F (37.1 °C)   TempSrc: Temporal   SpO2: 98%   Weight: 196 lb (88.9 kg)   Height: 5' 5\" (1.651 m)   PainSc:   7   PainLoc: Generalized         HPI: Patient is coming for complete physical examination and to get blood work done  [facility] 1/2 pack per day   Smoke 1.5 pack for over 30 years   Drink on weekend 3 drinks   She does have fibromyalgia and sometimes she requires a cane due to her pain requesting prescription for cane    She does have lesion on her right buttocks she thought it was changing      Patient has been experiencing difficulty complaining during her defecation and she has to manually press the area between her vagina and the anus to push stool        Past Medical History:   Diagnosis Date    Breast cyst     Depression     Emphysema lung ([address]     1 year and a half it was mentioned by ER Doc.     Fibromyalgia     Hyperlipidemia     Hypothyroidism     Ovarian cyst      Past Surgical History:   Procedure Laterality Date    HX APPENDECTOMY      HX GYN      Cyst, Uterine Ablation    HX TUBAL LIGATION      IMPLANT BREAST SILICONE/EQ Bilateral     Approximately 1999    [place] BREAST SURGERY PROCEDURE UNLISTED       Social History     Tobacco Use    Smoking status: Every Day     Packs/day: 0.50     Types: Cigarettes    Smokeless tobacco: Current    Tobacco comments:     patient stated she quit three weeks ago cig and now uses vape   Substance Use Topics    Alcohol use: Yes     Comment: occ       Family History   Problem Relation Age of Onset    Heart Disease Mother     Dementia Mother     Heart Disease Father     Heart Disease Sister     Mult Sclerosis Sister     Heart Disease Brother         3 heart attacks    Cancer Paternal Aunt         breast    Breast Cancer Maternal Aunt     Diabetes Neg Hx     Hypertension Neg Hx        Review of Systems   Constitutional: Negative for chills, fever, malaise/fatigue and weight loss. HENT:  Negative for congestion, ear discharge, ear pain, hearing loss, nosebleeds, sinus pain and sore throat. Eyes:  Negative for blurred vision, double vision and discharge. Respiratory:  Negative for cough, hemoptysis, sputum production, shortness of breath, wheezing and stridor. Cardiovascular:  Negative for chest pain, palpitations, claudication and leg swelling. Gastrointestinal:  Negative for abdominal pain, blood in stool, constipation, diarrhea, nausea and vomiting. Genitourinary:  Negative for dysuria, flank pain, frequency, hematuria and urgency. Musculoskeletal:  Negative for back pain, falls, joint pain, myalgias and neck pain. Skin:  Negative for itching and rash. Neurological:  Negative for dizziness, tingling, sensory change, speech change, focal weakness, seizures, loss of consciousness, weakness and headaches. Psychiatric/Behavioral:  Negative for depression, hallucinations, memory loss, substance abuse and suicidal ideas. The patient is not nervous/anxious and does not have insomnia. Physical Exam  Vitals and nursing note reviewed. Constitutional:       General: She is not in acute distress. Appearance: She is well-developed. She is obese. She is not ill-appearing, toxic-appearing or diaphoretic. HENT:      Head: Normocephalic and atraumatic. Right Ear: Tympanic membrane, ear canal and external ear normal. There is no impacted cerumen. Left Ear: Tympanic membrane, ear canal and external ear normal. There is no impacted cerumen. Mouth/Throat:      Pharynx: No oropharyngeal exudate. Eyes:      General: No scleral icterus. Right eye: No discharge. Left eye: No discharge. Conjunctiva/sclera: Conjunctivae normal.      Pupils: Pupils are equal, round, and reactive to light. Neck:      Thyroid: No thyromegaly.    Cardiovascular:      Rate and Rhythm: Normal rate and regular rhythm. Heart sounds: Normal heart sounds. No murmur heard. Pulmonary:      Effort: Pulmonary effort is normal. No respiratory distress. Breath sounds: No wheezing, rhonchi or rales. Chest:      Chest wall: No tenderness. Abdominal:      General: There is no distension. Palpations: Abdomen is soft. There is no mass. Tenderness: There is no abdominal tenderness. There is no right CVA tenderness, left CVA tenderness, guarding or rebound. Hernia: No hernia is present. Genitourinary:     General: Normal vulva. Vagina: No vaginal discharge. Rectum: Normal. Guaiac result negative. Comments: Normal vagina and bimanual exam  Also rectal exam is normal  There is normal rectal prolapse no vaginal prolapse or uterine prolapse  Musculoskeletal:         General: No tenderness or deformity. Normal range of motion. Cervical back: Normal range of motion and neck supple. No rigidity or tenderness. Lymphadenopathy:      Cervical: No cervical adenopathy. Skin:     General: Skin is warm and dry. Coloration: Skin is not jaundiced or pale. Findings: No bruising, erythema, lesion or rash. Neurological:      Mental Status: She is alert and oriented to person, place, and time. Cranial Nerves: No cranial nerve deficit. Coordination: Coordination normal.   Psychiatric:         Behavior: Behavior normal.         Thought Content: Thought content normal.         Judgment: Judgment normal.        Assessment and plan     Plan of care has been discussed with the patient, he agrees to the plan and verbalized understanding. All his questions were answered  More than 50% of the time spent in this visit was counseling the patient about  illness and treatment options         1. Annual physical exam   CBC WITH AUTOMATED DIFF; Future  - METABOLIC PANEL, BASIC; Future  - LIPID PANEL; Future  - URINALYSIS W/ RFLX MICROSCOPIC; Future  - HEPATIC FUNCTION PANEL; Future    2. Vitamin D deficiency  She is taking vitamin D supplements once a week 50,000 units  - VITAMIN D, 25 HYDROXY; Future    3. Acquired hypothyroidism  She is adherent to thyroid medications  - TSH 3RD GENERATION; Future    4. Hyperlipidemia, unspecified hyperlipidemia type  She is adherent to atorvastatin 40 mg daily  - METABOLIC PANEL, BASIC; Future  - LIPID PANEL; Future    5. Seizure ([address]  \Has been stable    6. Needs flu shot  Flu vaccine was given is no complications  - INFLUENZA, FLUARIX, FLULAVAL, FLUZONE (AGE 6 MO+), AFLURIA(AGE 3Y+) IM, PF, 0.5 ML    7. Screening for diabetes mellitus (DM)  \  - HEMOGLOBIN A1C WITH EAG; Future    8. Fibromyalgia  \  - AMB SUPPLY ORDER    9. Class 1 drug-induced obesity without serious comorbidity with body mass index (BMI) of 32.0 to 32.9 in adult  Patient lost about 37 pounds by changing lifestyle and eating healthier  Current Outpatient Medications   Medication Sig Dispense Refill    Adderall XR 15 mg XR capsule Take 15 mg by mouth two (2) times a day. temazepam (RESTORIL) 30 mg capsule TAKE 1 TO 2 CAPSULES BY MOUTH AT BEDTIME      famotidine (PEPCID) 40 mg tablet Take 1 tablet by mouth once daily 90 Tablet 0    atorvastatin (LIPITOR) 40 mg tablet Take 1 tablet by mouth once daily 90 Tablet 0    ergocalciferol (ERGOCALCIFEROL) 1,250 mcg (50,000 unit) capsule Take 1 capsule by mouth once a week 4 Capsule 0    Euthyrox 137 mcg tablet Take 1 Tablet by mouth in the morning for 90 days. 90 Tablet 1    omeprazole (PRILOSEC) 20 mg capsule Take 1 capsule by mouth once daily 90 Capsule 0    nicotine (NICODERM CQ) 14 mg/24 hr patch 1 Patch by TransDERmal route. furosemide (LASIX) 20 mg tablet Take 1 Tablet by mouth daily.  (Patient taking differently: Take 20 mg by mouth as needed.) 30 Tablet 2    albuterol (PROVENTIL HFA, VENTOLIN HFA, PROAIR HFA) 90 mcg/actuation inhaler Albuterol inhaler, 2 puffs up to every 4 hours if needed for wheezing, cough, shortness of breath 1 Each 3    fluticasone propionate (FLOVENT HFA) 110 mcg/actuation inhaler Take 2 Puffs by inhalation every twelve (12) hours for 90 days. 3 Each 1    gabapentin (NEURONTIN) 300 mg capsule Take 300 mg by mouth two (2) times a day. Will be prescribed by rheumatology, Dr. Javier Isaac. ALPRAZolam (XANAX) 1 mg tablet TAKE 2 & 1 2 (TWO & ONE HALF) TABLETS BY MOUTH ONCE DAILY AS NEEDED FOR ANXIETY      zolpidem CR (AMBIEN CR) 12.5 mg tablet Take 10 mg by mouth nightly. amitriptyline (ELAVIL) 100 mg tablet Take 200 mg by mouth nightly. acetaminophen (TYLENOL) 500 mg tablet Take  by mouth every six (6) hours as needed for Pain. DULoxetine (CYMBALTA) 60 mg capsule Take 60 mg by mouth daily. Will be prescribed by rheumatology, Dr. Javier Isaac.          Patient Active Problem List    Diagnosis Date Noted    Palpitations 10/27/2020    Nephrolithiasis 10/27/2020    GANT (dyspnea on exertion) 10/27/2020    Severe obesity ([address] 08/07/2020    ISA (generalized anxiety disorder) 05/12/2020    PTSD (post-traumatic stress disorder) 05/12/2020    Panic attack 05/12/2020    Chronic fatigue 05/12/2020    Vitamin D deficiency 03/30/2019    Current moderate episode of major depressive disorder ([address] 12/06/2018    Hyperlipidemia 12/06/2018    Acquired hypothyroidism 12/06/2018    Fibromyalgia 12/06/2018    Obesity (BMI 30-39.9) 12/06/2018    Tobacco use 12/06/2018    Gastroesophageal reflux disease 12/06/2018    Lymphocytic colitis 12/06/2018     Results for orders placed or performed during the hospital encounter of 10/26/21   CBC WITH AUTOMATED DIFF   Result Value Ref Range    WBC 4.4 (L) 4.6 - 13.2 K/uL    RBC 4.13 (L) 4.20 - 5.30 M/uL    HGB 13.1 12.0 - 16.0 g/dL    HCT 40.4 35.0 - 45.0 %    MCV 97.8 78.0 - 100.0 FL    MCH 31.7 24.0 - 34.0 PG    MCHC 32.4 31.0 - 37.0 g/dL    RDW 12.0 11.6 - 14.5 %    PLATELET 751 704 - 398 K/uL    MPV 10.2 9.2 - 11.8 FL    NEUTROPHILS 59 40 - 73 %    LYMPHOCYTES 32 21 - 52 %    MONOCYTES 8 3 - 10 % EOSINOPHILS 0 0 - 5 %    BASOPHILS 1 0 - 2 %    ABS. NEUTROPHILS 2.6 1.8 - 8.0 K/UL    ABS. LYMPHOCYTES 1.4 0.9 - 3.6 K/UL    ABS. MONOCYTES 0.4 0.05 - 1.2 K/UL    ABS. EOSINOPHILS 0.0 0.0 - 0.4 K/UL    ABS. BASOPHILS 0.0 0.0 - 0.1 K/UL    DF AUTOMATED     LIPID PANEL   Result Value Ref Range    LIPID PROFILE          Cholesterol, total 181 <200 MG/DL    Triglyceride 304 (H) <150 MG/DL    HDL Cholesterol 42 40 - 60 MG/DL    LDL, calculated 78.2 0 - 100 MG/DL    VLDL, calculated 60.8 MG/DL    CHOL/HDL Ratio 4.3 0 - 5.0     METABOLIC PANEL, COMPREHENSIVE   Result Value Ref Range    Sodium 140 136 - 145 mmol/L    Potassium 4.3 3.5 - 5.5 mmol/L    Chloride 106 100 - 111 mmol/L    CO2 28 21 - 32 mmol/L    Anion gap 6 3.0 - 18 mmol/L    Glucose 108 (H) 74 - 99 mg/dL    BUN 14 7.0 - 18 MG/DL    Creatinine 1.00 0.6 - 1.3 MG/DL    BUN/Creatinine ratio 14 12 - 20      GFR est AA >60 >60 ml/min/1.73m2    GFR est non-AA 58 (L) >60 ml/min/1.73m2    Calcium 9.1 8.5 - 10.1 MG/DL    Bilirubin, total 0.5 0.2 - 1.0 MG/DL    ALT (SGPT) 38 13 - 56 U/L    AST (SGOT) 18 10 - 38 U/L    Alk.  phosphatase 84 45 - 117 U/L    Protein, total 6.7 6.4 - 8.2 g/dL    Albumin 3.7 3.4 - 5.0 g/dL    Globulin 3.0 2.0 - 4.0 g/dL    A-G Ratio 1.2 0.8 - 1.7     TSH AND FREE T4   Result Value Ref Range    TSH 3.51 0.36 - 3.74 uIU/mL    T4, Free 1.1 0.7 - 1.5 NG/DL     Hospital Outpatient Visit on 11/21/2022   Component Date Value Ref Range Status    WBC 11/21/2022 7.1  4.6 - 13.2 K/uL Final    RBC 11/21/2022 4.26  4.20 - 5.30 M/uL Final    HGB 11/21/2022 13.4  12.0 - 16.0 g/dL Final    HCT 11/21/2022 40.4  35.0 - 45.0 % Final    MCV 11/21/2022 94.8  78.0 - 100.0 FL Final    MCH 11/21/2022 31.5  24.0 - 34.0 PG Final    MCHC 11/21/2022 33.2  31.0 - 37.0 g/dL Final    RDW 11/21/2022 12.9  11.6 - 14.5 % Final    PLATELET 57/57/5686 352  135 - 420 K/uL Final    MPV 11/21/2022 11.1  9.2 - 11.8 FL Final    NRBC 11/21/2022 0.0  0  WBC Final    ABSOLUTE NRBC 11/21/2022 0.00  0.00 - 0.01 K/uL Final    NEUTROPHILS 11/21/2022 61  40 - 73 % Final    LYMPHOCYTES 11/21/2022 32  21 - 52 % Final    MONOCYTES 11/21/2022 5  3 - 10 % Final    EOSINOPHILS 11/21/2022 2  0 - 5 % Final    BASOPHILS 11/21/2022 1  0 - 2 % Final    IMMATURE GRANULOCYTES 11/21/2022 0  0.0 - 0.5 % Final    ABS. NEUTROPHILS 11/21/2022 4.3  1.8 - 8.0 K/UL Final    ABS. LYMPHOCYTES 11/21/2022 2.2  0.9 - 3.6 K/UL Final    ABS. MONOCYTES 11/21/2022 0.4  0.05 - 1.2 K/UL Final    ABS. EOSINOPHILS 11/21/2022 0.1  0.0 - 0.4 K/UL Final    ABS. BASOPHILS 11/21/2022 0.1  0.0 - 0.1 K/UL Final    ABS. IMM.  GRANS. 11/21/2022 0.0  0.00 - 0.04 K/UL Final    DF 11/21/2022 AUTOMATED    Final

## 2022-11-26 NOTE — PROGRESS NOTES
Urine test results are not concerning no signs of infection  Elevated vitamin D level to stop vitamin D supplements for 3 months after that she should take 1000 unit daily of vitamin D3 over-the-counter  Normal hemoglobin A1c  Normal cholesterol panel  Slightly elevated thyroid function test patient need to make sure she is adherent to levothyroxine daily on empty stomach and to need 25 minutes before she eats or drinks anything other than water and to bed for 2 hours take any medications test need to be TBD in 3 months  Normal kidney function and electrolytes  Normal hemoglobin and white cell count  Patient has low level of liver enzyme AST might be secondary to vitamin B6 deficiency advise to take B complex with vitamin 6

## 2022-12-19 DIAGNOSIS — K21.9 GASTROESOPHAGEAL REFLUX DISEASE WITHOUT ESOPHAGITIS: ICD-10-CM

## 2022-12-19 DIAGNOSIS — E55.9 VITAMIN D DEFICIENCY: ICD-10-CM

## 2022-12-19 RX ORDER — FAMOTIDINE 40 MG/1
TABLET, FILM COATED ORAL
Qty: 30 TABLET | Refills: 0 | Status: SHIPPED | OUTPATIENT
Start: 2022-12-19

## 2022-12-19 RX ORDER — ERGOCALCIFEROL 1.25 MG/1
CAPSULE ORAL
Qty: 4 CAPSULE | Refills: 0 | Status: SHIPPED | OUTPATIENT
Start: 2022-12-19

## 2023-01-20 ENCOUNTER — TELEPHONE (OUTPATIENT)
Dept: FAMILY MEDICINE CLINIC | Age: 53
End: 2023-01-20

## 2023-01-20 DIAGNOSIS — N28.89 LEFT RENAL MASS: Primary | ICD-10-CM

## 2023-01-20 NOTE — TELEPHONE ENCOUNTER
Rd Pearson from Dr. Dee Yu office called to inform PCP that patient had a low dose CT completed and it showed a renal mass. Patient has a VV scheduled to discuss results with  Dr. Jamal Oglesby, however he retired 12/2022 and can not legally follow her care afterwards. Patient is in need of a Renal ultrasound order ASA, however Dr. Jamal Oglesby can not order it because he retired and can not result an outstanding order. Patient was advised to find another pulmonologist. Patient picked Dr. Danyel Dailey, however has not established care at the moment. Can you order this testing? Dr. Danyel Dailey # [phone]  Fax: [phone]    Last office visit; 11/21/2022    Future appts; No future appointments.

## 2023-01-23 ENCOUNTER — TELEPHONE (OUTPATIENT)
Dept: FAMILY MEDICINE CLINIC | Age: 53
End: 2023-01-23

## 2023-01-23 NOTE — TELEPHONE ENCOUNTER
----- Message from Milena Guaman sent at 1/23/2023  9:39 AM EST -----  Subject: Message to Provider    QUESTIONS  Information for Provider? Patient states that she ahs a Low dose Ct done   and they found a renal mass on her left kidney and recommend for her to   have an US done on her left kidney. Patient is having pain on that side. Patient doesn't know who will be ordering this and when she should have it   done. Please advise   ---------------------------------------------------------------------------  --------------  Yessica HURST  [phone]; OK to leave message on voicemail  ---------------------------------------------------------------------------  --------------  SCRIPT ANSWERS  Relationship to Patient?  Self

## 2023-01-27 ENCOUNTER — HOSPITAL ENCOUNTER (OUTPATIENT)
Dept: ULTRASOUND IMAGING | Age: 53
Discharge: HOME | End: 2023-01-27
Attending: LEGAL MEDICINE
Payer: MEDICAID

## 2023-01-27 DIAGNOSIS — N28.89 LEFT RENAL MASS: ICD-10-CM

## 2023-01-27 PROCEDURE — 76770 US EXAM ABDO BACK WALL COMP: CPT

## 2023-01-31 NOTE — PROGRESS NOTES
Informed pt of ultrasound results; pt voiced understanding. Statement Selected Yes, Non-Core measure site...

## 2023-01-31 NOTE — PROGRESS NOTES
Renal ultrasound showed normal kidneys no stones no mass  Ultrasound to check did not show any mass that was seen in the CT

## 2023-02-23 RX ORDER — ERGOCALCIFEROL 1.25 MG/1
CAPSULE ORAL
Qty: 4 CAPSULE | Refills: 0 | Status: SHIPPED | OUTPATIENT
Start: 2023-02-23

## 2023-02-23 RX ORDER — LEVOTHYROXINE SODIUM 137 UG/1
TABLET ORAL
Qty: 90 TABLET | Refills: 0 | Status: SHIPPED | OUTPATIENT
Start: 2023-02-23

## 2023-03-23 DIAGNOSIS — Z13.1 ENCOUNTER FOR SCREENING FOR DIABETES MELLITUS: ICD-10-CM

## 2023-03-23 DIAGNOSIS — E03.9 HYPOTHYROIDISM, UNSPECIFIED TYPE: ICD-10-CM

## 2023-03-23 DIAGNOSIS — Z00.00 ANNUAL PHYSICAL EXAM: Primary | ICD-10-CM

## 2023-03-23 DIAGNOSIS — E55.9 VITAMIN D DEFICIENCY: ICD-10-CM

## 2023-03-23 DIAGNOSIS — E78.5 HYPERLIPIDEMIA, UNSPECIFIED HYPERLIPIDEMIA TYPE: ICD-10-CM

## 2023-03-24 RX ORDER — ATORVASTATIN CALCIUM 40 MG/1
TABLET, FILM COATED ORAL
Qty: 90 TABLET | Refills: 1 | Status: SHIPPED | OUTPATIENT
Start: 2023-03-24

## 2023-03-24 RX ORDER — ERGOCALCIFEROL 1.25 MG/1
50000 CAPSULE ORAL
Qty: 6 CAPSULE | Refills: 0 | Status: SHIPPED | OUTPATIENT
Start: 2023-03-24 | End: 2023-06-22

## 2023-04-25 DIAGNOSIS — Z00.00 ANNUAL PHYSICAL EXAM: ICD-10-CM

## 2023-04-25 DIAGNOSIS — E55.9 VITAMIN D DEFICIENCY: ICD-10-CM

## 2023-04-25 NOTE — TELEPHONE ENCOUNTER
Jessica Treviño called for their medication refill. Last Office visit:  11/21/2022    Last Filled: 12/19/2022; 4 w/ 0 refills     Follow up visit:  No future appointments.

## 2023-04-28 DIAGNOSIS — E55.9 VITAMIN D DEFICIENCY: ICD-10-CM

## 2023-04-28 DIAGNOSIS — Z00.00 ANNUAL PHYSICAL EXAM: ICD-10-CM

## 2023-04-30 RX ORDER — ERGOCALCIFEROL 1.25 MG/1
CAPSULE ORAL
Qty: 4 CAPSULE | Refills: 0 | Status: SHIPPED | OUTPATIENT
Start: 2023-04-30

## 2023-04-30 RX ORDER — ERGOCALCIFEROL 1.25 MG/1
50000 CAPSULE ORAL
Qty: 6 CAPSULE | Refills: 0 | OUTPATIENT
Start: 2023-04-30 | End: 2023-07-29

## 2023-05-01 ENCOUNTER — TELEPHONE (OUTPATIENT)
Dept: FAMILY MEDICINE CLINIC | Facility: CLINIC | Age: 53
End: 2023-05-01

## 2023-05-01 NOTE — TELEPHONE ENCOUNTER
Pt called to schedule ED Follow-Up. PT was seen for numbness on left side of body. and when she woke up this morning the numbness ahd spread. PCP did not have any openings until May 23rd. Spoke with RONY Reis and she advised for pt to go back to ER or urgent care. And to send a message to try and get pt a referral to Neurology. Please advise.

## 2023-05-04 NOTE — TELEPHONE ENCOUNTER
Spoke w/pt and pt was admitted to [facility] and discharge, please review notes from that hospital stay; pt is scheduled to see PCP on:   Future Appointments   Date Time Provider Albino Bain   5/11/2023 10:30 AM Danni Manzo MD [facility]

## 2023-05-11 ENCOUNTER — OFFICE VISIT (OUTPATIENT)
Dept: FAMILY MEDICINE CLINIC | Facility: CLINIC | Age: 53
End: 2023-05-11
Payer: COMMERCIAL

## 2023-05-11 VITALS
HEART RATE: 109 BPM | BODY MASS INDEX: 33.29 KG/M2 | TEMPERATURE: 98 F | HEIGHT: 64 IN | RESPIRATION RATE: 14 BRPM | SYSTOLIC BLOOD PRESSURE: 130 MMHG | WEIGHT: 195 LBS | DIASTOLIC BLOOD PRESSURE: 80 MMHG | OXYGEN SATURATION: 98 %

## 2023-05-11 DIAGNOSIS — K21.9 GASTROESOPHAGEAL REFLUX DISEASE WITHOUT ESOPHAGITIS: ICD-10-CM

## 2023-05-11 DIAGNOSIS — E55.9 VITAMIN D DEFICIENCY: ICD-10-CM

## 2023-05-11 DIAGNOSIS — M79.7 FIBROMYALGIA: ICD-10-CM

## 2023-05-11 DIAGNOSIS — F43.10 PTSD (POST-TRAUMATIC STRESS DISORDER): ICD-10-CM

## 2023-05-11 DIAGNOSIS — E03.9 HYPOTHYROIDISM, UNSPECIFIED TYPE: Primary | ICD-10-CM

## 2023-05-11 DIAGNOSIS — R20.8 LOSS OF TOUCH SENSATION ON EXAMINATION: ICD-10-CM

## 2023-05-11 DIAGNOSIS — R53.82 CHRONIC FATIGUE: ICD-10-CM

## 2023-05-11 PROCEDURE — 99214 OFFICE O/P EST MOD 30 MIN: CPT | Performed by: LEGAL MEDICINE

## 2023-05-11 RX ORDER — ASPIRIN 81 MG/1
TABLET, COATED ORAL
COMMUNITY
Start: 2023-05-02

## 2023-05-11 SDOH — ECONOMIC STABILITY: HOUSING INSECURITY
IN THE LAST 12 MONTHS, WAS THERE A TIME WHEN YOU DID NOT HAVE A STEADY PLACE TO SLEEP OR SLEPT IN A SHELTER (INCLUDING NOW)?: PATIENT REFUSED

## 2023-05-11 SDOH — ECONOMIC STABILITY: FOOD INSECURITY: WITHIN THE PAST 12 MONTHS, THE FOOD YOU BOUGHT JUST DIDN'T LAST AND YOU DIDN'T HAVE MONEY TO GET MORE.: PATIENT DECLINED

## 2023-05-11 SDOH — ECONOMIC STABILITY: INCOME INSECURITY: HOW HARD IS IT FOR YOU TO PAY FOR THE VERY BASICS LIKE FOOD, HOUSING, MEDICAL CARE, AND HEATING?: PATIENT DECLINED

## 2023-05-11 SDOH — ECONOMIC STABILITY: FOOD INSECURITY: WITHIN THE PAST 12 MONTHS, YOU WORRIED THAT YOUR FOOD WOULD RUN OUT BEFORE YOU GOT MONEY TO BUY MORE.: PATIENT DECLINED

## 2023-05-11 ASSESSMENT — ENCOUNTER SYMPTOMS
EYE PAIN: 0
NAUSEA: 0
EYE REDNESS: 0
SORE THROAT: 0
SINUS PAIN: 0
FACIAL SWELLING: 0
APNEA: 0
CHOKING: 0
BLOOD IN STOOL: 0
WHEEZING: 0
EYE DISCHARGE: 0
RHINORRHEA: 0
ABDOMINAL PAIN: 0
VOMITING: 0
SINUS PRESSURE: 0
STRIDOR: 0
EYE ITCHING: 0
BACK PAIN: 0
CONSTIPATION: 0
ANAL BLEEDING: 0
SHORTNESS OF BREATH: 0
COUGH: 0
CHEST TIGHTNESS: 0
DIARRHEA: 0

## 2023-05-11 ASSESSMENT — PATIENT HEALTH QUESTIONNAIRE - PHQ9
6. FEELING BAD ABOUT YOURSELF - OR THAT YOU ARE A FAILURE OR HAVE LET YOURSELF OR YOUR FAMILY DOWN: 0
SUM OF ALL RESPONSES TO PHQ QUESTIONS 1-9: 0
7. TROUBLE CONCENTRATING ON THINGS, SUCH AS READING THE NEWSPAPER OR WATCHING TELEVISION: 0
SUM OF ALL RESPONSES TO PHQ QUESTIONS 1-9: 0
1. LITTLE INTEREST OR PLEASURE IN DOING THINGS: 0
3. TROUBLE FALLING OR STAYING ASLEEP: 0
10. IF YOU CHECKED OFF ANY PROBLEMS, HOW DIFFICULT HAVE THESE PROBLEMS MADE IT FOR YOU TO DO YOUR WORK, TAKE CARE OF THINGS AT HOME, OR GET ALONG WITH OTHER PEOPLE: 0
SUM OF ALL RESPONSES TO PHQ9 QUESTIONS 1 & 2: 0
SUM OF ALL RESPONSES TO PHQ QUESTIONS 1-9: 0
9. THOUGHTS THAT YOU WOULD BE BETTER OFF DEAD, OR OF HURTING YOURSELF: 0
8. MOVING OR SPEAKING SO SLOWLY THAT OTHER PEOPLE COULD HAVE NOTICED. OR THE OPPOSITE, BEING SO FIGETY OR RESTLESS THAT YOU HAVE BEEN MOVING AROUND A LOT MORE THAN USUAL: 0
SUM OF ALL RESPONSES TO PHQ QUESTIONS 1-9: 0
2. FEELING DOWN, DEPRESSED OR HOPELESS: 0
4. FEELING TIRED OR HAVING LITTLE ENERGY: 0
5. POOR APPETITE OR OVEREATING: 0

## 2023-05-11 NOTE — PROGRESS NOTES
Joceline Mcginnis is a 45 y.o. female (: 1970) presenting to address:    Chief Complaint   Patient presents with    Follow-Up from Hospital       Vitals:    23 1037   BP: 130/80   Pulse: (!) 109   Resp: 14   Temp: 98 °F (36.7 °C)   SpO2: 98%       Coordination of Care Questionaire:   1. \"Have you been to the ER, urgent care clinic since your last visit? Hospitalized since your last visit? \" Yes When: 2023 Where: ER Reason for visit: numbness in face and arm    2. \"Have you seen or consulted any other health care providers outside of the [address] [place] [address] since your last visit? \" No     3. For patients aged 39-70: Has the patient had a colonoscopy / FIT/ Cologuard? Yes - Care Gap present. Most recent result on file      If the patient is female:    4. For patients aged 41-77: Has the patient had a mammogram within the past 2 years? Yes - Care Gap present. Most recent result on file      5. For patients aged 21-65: Has the patient had a pap smear? Yes - Care Gap present. Most recent result on file    Advanced Directive:   1. Do you have an Advanced Directive? No    2. Would you like information on Advanced Directives?  No
Sensory deficit present. Motor: No weakness. Coordination: Coordination normal.      Comments: Decreased touch sensation  in the right arm ,right cheeks and lower leg    Psychiatric:         Mood and Affect: Mood normal.         Thought Content: Thought content normal.         Judgment: Judgment normal.        Assessment and plan     Plan of care has been discussed with the patient, he agrees to the plan and verbalized understanding. All his questions were answered  More than 50% of the time spent in this visit was counseling the patient about  illness and treatment options         ICD-10-CM    1. Hypothyroidism, unspecified type  E03.9       2. Gastroesophageal reflux disease without esophagitis  K21.9       3. Fibromyalgia  M79.7       4. Vitamin D deficiency  E55.9       5. Chronic fatigue  R53.82       6. PTSD (post-traumatic stress disorder)  F43.10       7.  Loss of touch sensation on examination  R20.8 [facility] - Dorina Short MD, Neurology, [facility]         Current Outpatient Medications   Medication Sig Dispense Refill    ASPIRIN LOW DOSE 81 MG EC tablet       cyanocobalamin 1000 MCG tablet Take 1 tablet by mouth daily      vitamin D (ERGOCALCIFEROL) 1.25 MG (14986 UT) CAPS capsule Take 1 capsule by mouth once a week 4 capsule 0    atorvastatin (LIPITOR) 40 MG tablet Take 1 tablet by mouth once daily 90 tablet 1    EUTHYROX 137 MCG tablet TAKE 1 TABLET BY MOUTH IN THE MORNING FOR 90 DAYS 90 tablet 0    acetaminophen (TYLENOL) 500 MG tablet Take by mouth every 6 hours as needed      albuterol sulfate HFA (PROVENTIL;VENTOLIN;PROAIR) 108 (90 Base) MCG/ACT inhaler Albuterol inhaler, 2 puffs up to every 4 hours if needed for wheezing, cough, shortness of breath      ALPRAZolam (XANAX) 1 MG tablet TAKE 2 & 1 2 (TWO & ONE HALF) TABLETS BY MOUTH ONCE DAILY AS NEEDED FOR ANXIETY      amitriptyline (ELAVIL) 100 MG tablet Take 2 tablets by mouth      amphetamine-dextroamphetamine (ADDERALL XR) 15 MG extended

## 2023-05-16 ENCOUNTER — TELEPHONE (OUTPATIENT)
Dept: FAMILY MEDICINE CLINIC | Facility: CLINIC | Age: 53
End: 2023-05-16

## 2023-05-16 DIAGNOSIS — R41.3 OTHER AMNESIA: Primary | ICD-10-CM

## 2023-05-16 DIAGNOSIS — R20.8 LOSS OF TOUCH SENSATION ON EXAMINATION: ICD-10-CM

## 2023-05-16 NOTE — TELEPHONE ENCOUNTER
----- Message from Shaka Engel LPN sent at 9/16/9407  1:28 PM EDT -----  Subject: Referral Request    Reason for referral request? Dr. Filemon Beltrán neurology would like a   referral her and there fax [phone]  [phone]  Provider patient wants to be referred to(if known):     Provider Phone Number(if known):     Additional Information for Provider?   ---------------------------------------------------------------------------  --------------  [address] [place] [address] [facility]    [phone]; OK to leave message on voicemail  ---------------------------------------------------------------------------  --------------

## 2023-05-27 DIAGNOSIS — E03.9 HYPOTHYROIDISM, UNSPECIFIED TYPE: Primary | ICD-10-CM

## 2023-05-30 NOTE — TELEPHONE ENCOUNTER
Brigid Agarwal called for their medication refill. Last Office visit:  05/11/23    Last Filled: 02/23/23 qty 90 w/ 0 refills    Follow up visit:  No future appointments.

## 2023-05-31 RX ORDER — LEVOTHYROXINE SODIUM 137 UG/1
TABLET ORAL
Qty: 90 TABLET | Refills: 1 | Status: SHIPPED | OUTPATIENT
Start: 2023-05-31

## 2023-06-06 ENCOUNTER — TELEPHONE (OUTPATIENT)
Dept: FAMILY MEDICINE CLINIC | Facility: CLINIC | Age: 53
End: 2023-06-06

## 2023-06-06 DIAGNOSIS — R41.3 OTHER AMNESIA: Primary | ICD-10-CM

## 2023-06-06 NOTE — TELEPHONE ENCOUNTER
----- Message from Marika Silva sent at 6/6/2023  3:11 PM EDT -----  Subject: Referral Request    Reason for referral request? Neurology(New referral) current one does not   accept Insurance. Referral, OV Notes, LABS, MRI report, and Insurance   information faxed to: [facility] Attn: Dr. Jareth Camp   Phone: [phone]   Provider patient wants to be referred to(if known):     Provider Phone Number(if known): Additional Information for Provider? Pt was referred to a Neurologist on   05/16/23 Rica Leon MD and Pt called to schedule an appt but   was told they don't accept her insurance.  Pt would like to be referred to   a location that does and closes to [place] as possible.   ---------------------------------------------------------------------------  --------------  [facility]    [phone]; OK to leave message on voicemail,Do not leave any message,   patient will call back for answer,OK to respond with electronic message   via [facility] portal (only for patients who have registered [facility] account)  ---------------------------------------------------------------------------  --------------

## 2023-06-20 NOTE — TELEPHONE ENCOUNTER
Pt is having a hard time trying to find a Rheumatologist that accepts her [employer] medicaid replacement insurance. She said she has called [employer] and every time they give her doctors, when she calls them they are no longer accepting that insurance. She is wondering if we may know of any other providers accepting her insurance. She is also thinking of switching insurances to a different medicaid plan that she might have better luck finding doctors if we might know what type of plan would be better. Lower Range (In Mg/Kg): 120

## 2023-06-22 ENCOUNTER — TELEPHONE (OUTPATIENT)
Dept: FAMILY MEDICINE CLINIC | Facility: CLINIC | Age: 53
End: 2023-06-22

## 2023-06-22 NOTE — TELEPHONE ENCOUNTER
----- Message from Rica Dean sent at 6/22/2023  2:29 PM EDT -----  Subject: Referral Request    Reason for referral request? Pt called stating that her referral for   Neurology to [address] [place] [address] will not work due to insurance   reasons. Pt is requesting a another referral to another location. Pt does   not have a specific office in mind. Please return call for questions or   updates. Provider patient wants to be referred to(if known):     Provider Phone Number(if known):     Additional Information for Provider?   ---------------------------------------------------------------------------  --------------  [address]    [phone]; OK to leave message on voicemail  ---------------------------------------------------------------------------  --------------

## 2023-06-22 NOTE — TELEPHONE ENCOUNTER
Informed pt to contact insurance to see who is in network and get that information back to our office.

## 2023-06-28 ENCOUNTER — TELEPHONE (OUTPATIENT)
Dept: FAMILY MEDICINE CLINIC | Facility: CLINIC | Age: 53
End: 2023-06-28

## 2023-06-28 DIAGNOSIS — Z00.00 ANNUAL PHYSICAL EXAM: ICD-10-CM

## 2023-06-28 DIAGNOSIS — E55.9 VITAMIN D DEFICIENCY: ICD-10-CM

## 2023-06-28 NOTE — TELEPHONE ENCOUNTER
----- Message from Jared Jack sent at 6/28/2023  2:31 PM EDT -----  Subject: Referral Request    Reason for referral request? Patient is requesting a new referral for   [facility]. The phone is [phone]. The new one the insurance   will pay for. Please call back when referral has been placed or to advise. Provider patient wants to be referred to(if known):     Provider Phone Number(if known):     Additional Information for Provider?   ---------------------------------------------------------------------------  --------------  [address]    [phone]; OK to leave message on voicemail  ---------------------------------------------------------------------------  --------------

## 2023-07-02 RX ORDER — ERGOCALCIFEROL 1.25 MG/1
CAPSULE ORAL
Qty: 4 CAPSULE | Refills: 0 | Status: SHIPPED | OUTPATIENT
Start: 2023-07-02

## 2023-07-30 DIAGNOSIS — E78.5 HYPERLIPIDEMIA, UNSPECIFIED HYPERLIPIDEMIA TYPE: ICD-10-CM

## 2023-07-31 RX ORDER — ATORVASTATIN CALCIUM 40 MG/1
TABLET, FILM COATED ORAL
Qty: 90 TABLET | Refills: 1 | Status: SHIPPED | OUTPATIENT
Start: 2023-07-31

## 2023-09-01 DIAGNOSIS — E55.9 VITAMIN D DEFICIENCY: ICD-10-CM

## 2023-09-01 DIAGNOSIS — Z00.00 ANNUAL PHYSICAL EXAM: ICD-10-CM

## 2023-09-01 RX ORDER — ERGOCALCIFEROL 1.25 MG/1
50000 CAPSULE ORAL WEEKLY
Qty: 4 CAPSULE | Refills: 0 | Status: SHIPPED | OUTPATIENT
Start: 2023-09-01

## 2023-09-01 NOTE — TELEPHONE ENCOUNTER
Rosario Gordon called for their medication refill. Last Office visit:  5/11/2023    Last Filled: 7/2/2023; Qty 4 w/ 0 refills     Follow up visit:  No future appointments.

## 2023-10-03 DIAGNOSIS — E55.9 VITAMIN D DEFICIENCY: ICD-10-CM

## 2023-10-03 DIAGNOSIS — Z00.00 ANNUAL PHYSICAL EXAM: ICD-10-CM

## 2023-10-04 RX ORDER — ERGOCALCIFEROL 1.25 MG/1
50000 CAPSULE ORAL
Qty: 2 CAPSULE | Refills: 2 | Status: SHIPPED | OUTPATIENT
Start: 2023-10-04

## 2023-10-04 NOTE — TELEPHONE ENCOUNTER
Informed pt Vit D was refilled and new sig for pt to take 1 every 2 weeks, no 1 every week; pt voiced understanding.

## 2023-10-04 NOTE — TELEPHONE ENCOUNTER
Based on her 5/2023 Vit D lab, I want to decrease her dosage to every 2 weeks. Rx updated. Please advise.

## 2024-02-19 DIAGNOSIS — E03.9 HYPOTHYROIDISM, UNSPECIFIED TYPE: ICD-10-CM

## 2024-02-19 RX ORDER — LEVOTHYROXINE SODIUM 137 UG/1
TABLET ORAL
Qty: 90 TABLET | Refills: 1 | Status: SHIPPED | OUTPATIENT
Start: 2024-02-19

## 2024-02-19 NOTE — TELEPHONE ENCOUNTER
[facility] refill fax request:    Requested Prescriptions     Pending Prescriptions Disp Refills    EUTHYROX 137 MCG tablet 90 tablet 1

## 2024-02-19 NOTE — TELEPHONE ENCOUNTER
Last visit: 5/11/23  Next visit:   Future Appointments   Date Time Provider Department Center   3/21/2024 11:30 AM Jacquelyn Lopez DO [facility]     Last filled: 5/31/23; euthyrox 137 mcg tab; qty 90 w/1 refill

## 2024-03-21 ENCOUNTER — OFFICE VISIT (OUTPATIENT)
Dept: FAMILY MEDICINE CLINIC | Facility: CLINIC | Age: 54
End: 2024-03-21

## 2024-03-21 VITALS
RESPIRATION RATE: 16 BRPM | HEIGHT: 64 IN | TEMPERATURE: 97.2 F | SYSTOLIC BLOOD PRESSURE: 135 MMHG | HEART RATE: 92 BPM | WEIGHT: 181 LBS | OXYGEN SATURATION: 99 % | BODY MASS INDEX: 30.9 KG/M2 | DIASTOLIC BLOOD PRESSURE: 59 MMHG

## 2024-03-21 DIAGNOSIS — E03.9 ACQUIRED HYPOTHYROIDISM: ICD-10-CM

## 2024-03-21 DIAGNOSIS — Z76.89 ESTABLISHING CARE WITH NEW DOCTOR, ENCOUNTER FOR: ICD-10-CM

## 2024-03-21 DIAGNOSIS — M54.2 CHRONIC NECK PAIN: ICD-10-CM

## 2024-03-21 DIAGNOSIS — G89.29 CHRONIC NECK PAIN: ICD-10-CM

## 2024-03-21 DIAGNOSIS — Z00.00 ANNUAL PHYSICAL EXAM: ICD-10-CM

## 2024-03-21 DIAGNOSIS — Z76.89 ESTABLISHING CARE WITH NEW DOCTOR, ENCOUNTER FOR: Primary | ICD-10-CM

## 2024-03-21 RX ORDER — CYCLOBENZAPRINE HCL 5 MG
5 TABLET ORAL 3 TIMES DAILY PRN
COMMUNITY

## 2024-03-21 RX ORDER — QUETIAPINE FUMARATE 25 MG/1
TABLET, FILM COATED ORAL
COMMUNITY

## 2024-03-21 ASSESSMENT — ENCOUNTER SYMPTOMS
SINUS PAIN: 0
CHEST TIGHTNESS: 0
SHORTNESS OF BREATH: 0

## 2024-03-21 ASSESSMENT — PATIENT HEALTH QUESTIONNAIRE - PHQ9
1. LITTLE INTEREST OR PLEASURE IN DOING THINGS: NOT AT ALL
10. IF YOU CHECKED OFF ANY PROBLEMS, HOW DIFFICULT HAVE THESE PROBLEMS MADE IT FOR YOU TO DO YOUR WORK, TAKE CARE OF THINGS AT HOME, OR GET ALONG WITH OTHER PEOPLE: NOT DIFFICULT AT ALL
SUM OF ALL RESPONSES TO PHQ QUESTIONS 1-9: 0
7. TROUBLE CONCENTRATING ON THINGS, SUCH AS READING THE NEWSPAPER OR WATCHING TELEVISION: NOT AT ALL
8. MOVING OR SPEAKING SO SLOWLY THAT OTHER PEOPLE COULD HAVE NOTICED. OR THE OPPOSITE, BEING SO FIGETY OR RESTLESS THAT YOU HAVE BEEN MOVING AROUND A LOT MORE THAN USUAL: NOT AT ALL
SUM OF ALL RESPONSES TO PHQ QUESTIONS 1-9: 0
SUM OF ALL RESPONSES TO PHQ QUESTIONS 1-9: 0
5. POOR APPETITE OR OVEREATING: NOT AT ALL
2. FEELING DOWN, DEPRESSED OR HOPELESS: NOT AT ALL
9. THOUGHTS THAT YOU WOULD BE BETTER OFF DEAD, OR OF HURTING YOURSELF: NOT AT ALL
6. FEELING BAD ABOUT YOURSELF - OR THAT YOU ARE A FAILURE OR HAVE LET YOURSELF OR YOUR FAMILY DOWN: NOT AT ALL
4. FEELING TIRED OR HAVING LITTLE ENERGY: NOT AT ALL
3. TROUBLE FALLING OR STAYING ASLEEP: NOT AT ALL
SUM OF ALL RESPONSES TO PHQ9 QUESTIONS 1 & 2: 0
SUM OF ALL RESPONSES TO PHQ QUESTIONS 1-9: 0

## 2024-03-21 NOTE — PROGRESS NOTES
Claire Servin is a 53 y.o. female (: 1970) presenting to address:    Chief Complaint   Patient presents with    Establish Care       Vitals:    24 1136   BP: (!) 135/59   Pulse: 92   Resp: 16   Temp: 97.2 °F (36.2 °C)   SpO2: 99%       \"Have you been to the ER, urgent care clinic since your last visit?  Hospitalized since your last visit?\"    Yes     “Have you seen or consulted any other health care providers outside of [facility] since your last visit?”    Yes

## 2024-03-21 NOTE — PROGRESS NOTES
[facility]    HISTORY OF PRESENT ILLNESS  Claire Servin  is a 53 y.o. y.o. female here to establish care.    Depression/panic attack/PTSD  -xanax 1mg, ativan, adderaell 15  -Dr. Lira  -gabapentin, amitriptline, duloxetine    Neck pain  -gradually worse  -following neurology Dr. Goldberg  -he recommended pain mgmt  -hx of grandmal seizure and mini stroke    Hypothyroidism  -euthyrox 137    Hx of lymes dx    Swelling of legs and hands  -hospitalized with lasix    Mouth ulcers  -painful with spice  -using magic mouth wash which makes things werse    Mr#: [id number]      Past Medical History:   Diagnosis Date    Breast cyst     Depression     Emphysema lung (HCC)     1 year and a half it was mentioned by ER Doc.    Fibromyalgia     Hyperlipidemia     Hypothyroidism     Ovarian cyst     Renal mass        Past Surgical History:   Procedure Laterality Date    APPENDECTOMY      BREAST SURGERY      GYN      Cyst, Uterine Ablation    IMPLANT BREAST SILICONE/EQ Bilateral     Approximately 1999    TUBAL LIGATION         Family History   Problem Relation Age of Onset    Heart Disease Father     Diabetes Neg Hx     Breast Cancer Maternal Aunt     Cancer Paternal Aunt         breast    Heart Disease Mother     Dementia Mother     Hypertension Neg Hx     Heart Disease Sister     Mult Sclerosis Sister     Heart Disease Brother         3 heart attacks       Allergies   Allergen Reactions    Sulfamethoxazole-Trimethoprim Other (See Comments)     Other reaction(s): Unknown (comments)  Thrush      Nickel Rash       Social History     Tobacco Use   Smoking Status Every Day    Current packs/day: 0.50    Types: Cigarettes   Smokeless Tobacco Current       Social History     Substance and Sexual Activity   Alcohol Use Yes       Immunization History   Administered Date(s) Administered    COVID-19, J&J, (age 18y+), IM, 0.5 mL 06/06/2021    COVID-19, MODERNA BLUE border, Primary or Immunocompromised, (age 12y+), IM, 100

## 2024-03-22 LAB
A/G RATIO: 2.1 RATIO (ref 1.1–2.6)
ALBUMIN SERPL-MCNC: 4.5 G/DL (ref 3.5–5)
ALP BLD-CCNC: 94 U/L (ref 25–115)
ALT SERPL-CCNC: 20 U/L (ref 5–40)
ANION GAP SERPL CALCULATED.3IONS-SCNC: 13 MMOL/L (ref 3–15)
AST SERPL-CCNC: 17 U/L (ref 10–37)
BASOPHILS # BLD: 1 % (ref 0–2)
BASOPHILS ABSOLUTE: 0.1 K/UL (ref 0–0.2)
BILIRUB SERPL-MCNC: 0.2 MG/DL (ref 0.2–1.2)
BUN BLDV-MCNC: 10 MG/DL (ref 6–22)
CALCIUM SERPL-MCNC: 9.6 MG/DL (ref 8.4–10.5)
CHLORIDE BLD-SCNC: 101 MMOL/L (ref 98–110)
CHOLESTEROL/HDL RATIO: 3.9 (ref 0–5)
CHOLESTEROL: 197 MG/DL (ref 110–200)
CO2: 24 MMOL/L (ref 20–32)
CREAT SERPL-MCNC: 0.9 MG/DL (ref 0.5–1.2)
EOSINOPHIL # BLD: 3 % (ref 0–6)
EOSINOPHILS ABSOLUTE: 0.2 K/UL (ref 0–0.5)
ESTIMATED AVERAGE GLUCOSE: 112 MG/DL (ref 91–123)
GLOBULIN: 2.1 G/DL (ref 2–4)
GLOMERULAR FILTRATION RATE: >60 ML/MIN/1.73 SQ.M.
GLUCOSE: 104 MG/DL (ref 70–99)
HBA1C MFR BLD: 5.5 % (ref 4.8–5.6)
HCT VFR BLD CALC: 40.9 % (ref 35.1–48)
HDLC SERPL-MCNC: 50 MG/DL
HEMOGLOBIN: 13.1 G/DL (ref 11.7–16)
HIV -1/0/2 AG/AB WITH REFLEX: NON REACTIVE
HIV INTERPRETATION: NORMAL
LDL CHOLESTEROL CALCULATED: 109 MG/DL (ref 50–99)
LDL/HDL RATIO: 2.2
LYMPHOCYTES # BLD: 37 % (ref 20–45)
LYMPHOCYTES ABSOLUTE: 2.8 K/UL (ref 1–4.8)
MCH RBC QN AUTO: 32 PG (ref 26–34)
MCHC RBC AUTO-ENTMCNC: 32 G/DL (ref 31–36)
MCV RBC AUTO: 99 FL (ref 80–99)
MONOCYTES ABSOLUTE: 0.4 K/UL (ref 0.1–1)
MONOCYTES: 5 % (ref 3–12)
NEUTROPHILS ABSOLUTE: 4.1 K/UL (ref 1.8–7.7)
NEUTROPHILS: 54 % (ref 40–75)
NON-HDL CHOLESTEROL: 147 MG/DL
PDW BLD-RTO: 12.9 % (ref 10–15.5)
PLATELET # BLD: 250 K/UL (ref 140–440)
PMV BLD AUTO: 10.8 FL (ref 9–13)
POTASSIUM SERPL-SCNC: 4.8 MMOL/L (ref 3.5–5.5)
RBC: 4.14 M/UL (ref 3.8–5.2)
SODIUM BLD-SCNC: 138 MMOL/L (ref 133–145)
T4 FREE: 0.9 NG/DL (ref 0.9–1.8)
TOTAL PROTEIN: 6.6 G/DL (ref 6.4–8.3)
TRIGL SERPL-MCNC: 189 MG/DL (ref 40–149)
TSH SERPL DL<=0.05 MIU/L-ACNC: 3.11 MCU/ML (ref 0.27–4.2)
VLDLC SERPL CALC-MCNC: 38 MG/DL (ref 8–30)
WBC: 7.6 K/UL (ref 4–11)

## 2024-03-25 ENCOUNTER — TELEPHONE (OUTPATIENT)
Dept: FAMILY MEDICINE CLINIC | Facility: CLINIC | Age: 54
End: 2024-03-25

## 2024-03-25 DIAGNOSIS — M54.2 NECK PAIN: Primary | ICD-10-CM

## 2024-03-25 NOTE — TELEPHONE ENCOUNTER
----- Message from Katie Oropeza sent at 3/25/2024  4:15 PM EDT -----  Subject: Referral Request    Reason for referral request? Patient is requesting new referral to Ortho   or Pain management that accepts her [employer]. Please advise.   Thank you   Provider patient wants to be referred to(if known):     Provider Phone Number(if known):    Additional Information for Provider?   ---------------------------------------------------------------------------  --------------  CALL BACK INFO    [phone]; OK to leave message on voicemail  ---------------------------------------------------------------------------  --------------

## 2024-03-29 ENCOUNTER — TELEPHONE (OUTPATIENT)
Dept: FAMILY MEDICINE CLINIC | Facility: CLINIC | Age: 54
End: 2024-03-29

## 2024-03-29 NOTE — TELEPHONE ENCOUNTER
----- Message from Teresita Garcia sent at 3/28/2024  4:09 PM EDT -----  Subject: Referral Request    Reason for referral request? pt. Claire Servin would like pain management   referral sent to [facility] @[address] [place] [address],   [address], [place], [place], [address] fax [phone]   Provider patient wants to be referred to(if known):     Provider Phone Number(if known):[phone]    Additional Information for Provider?   ---------------------------------------------------------------------------  --------------  CALL BACK INFO    [phone]; OK to leave message on voicemail,OK to respond with electronic   message via [employer] portal (only for patients who have registered [employer]   account)  ---------------------------------------------------------------------------  --------------

## 2024-04-19 ENCOUNTER — TELEPHONE (OUTPATIENT)
Dept: FAMILY MEDICINE CLINIC | Facility: CLINIC | Age: 54
End: 2024-04-19

## 2024-04-22 ENCOUNTER — TELEPHONE (OUTPATIENT)
Dept: FAMILY MEDICINE CLINIC | Facility: CLINIC | Age: 54
End: 2024-04-22

## 2024-04-22 DIAGNOSIS — E78.2 MODERATE MIXED HYPERLIPIDEMIA NOT REQUIRING STATIN THERAPY: Primary | ICD-10-CM

## 2024-05-08 ENCOUNTER — OFFICE VISIT (OUTPATIENT)
Dept: FAMILY MEDICINE CLINIC | Facility: CLINIC | Age: 54
End: 2024-05-08

## 2024-05-08 VITALS
WEIGHT: 177 LBS | RESPIRATION RATE: 16 BRPM | SYSTOLIC BLOOD PRESSURE: 145 MMHG | OXYGEN SATURATION: 98 % | HEART RATE: 102 BPM | DIASTOLIC BLOOD PRESSURE: 84 MMHG | TEMPERATURE: 96.8 F | BODY MASS INDEX: 30.22 KG/M2 | HEIGHT: 64 IN

## 2024-05-08 DIAGNOSIS — F17.200 CURRENT EVERY DAY SMOKER: ICD-10-CM

## 2024-05-08 DIAGNOSIS — E78.2 MODERATE MIXED HYPERLIPIDEMIA NOT REQUIRING STATIN THERAPY: ICD-10-CM

## 2024-05-08 DIAGNOSIS — M54.2 NECK PAIN: ICD-10-CM

## 2024-05-08 DIAGNOSIS — Z12.31 ENCOUNTER FOR SCREENING MAMMOGRAM FOR MALIGNANT NEOPLASM OF BREAST: Primary | ICD-10-CM

## 2024-05-08 RX ORDER — LORAZEPAM 1 MG/1
2 TABLET ORAL NIGHTLY
COMMUNITY
Start: 2024-04-09

## 2024-05-08 RX ORDER — TIZANIDINE 4 MG/1
4 TABLET ORAL EVERY 6 HOURS PRN
COMMUNITY
Start: 2024-04-16

## 2024-05-08 ASSESSMENT — ENCOUNTER SYMPTOMS
SHORTNESS OF BREATH: 0
SINUS PAIN: 0
CHEST TIGHTNESS: 0

## 2024-05-08 NOTE — PROGRESS NOTES
Claire Servin is a 53 y.o. female (: 1970) presenting to address:    Chief Complaint   Patient presents with    Follow-up     Memory loss is concerning  Medication refills       Vitals:    24 1204   BP: (!) 145/84   Pulse:    Resp:    Temp:    SpO2:        \"Have you been to the ER, urgent care clinic since your last visit?  Hospitalized since your last visit?\"    NO    “Have you seen or consulted any other health care providers outside of [facility] since your last visit?”    Yes, [facility]

## 2024-05-08 NOTE — PROGRESS NOTES
[facility]    HISTORY OF PRESENT ILLNESS  Claire Servin  is a 53 y.o. y.o. female here for follow up.     Depression/panic attack/PTSD  -xanax 1mg, ativan, adderall 15  -Dr. Lira  -gabapentin, amitriptline, duloxetine  -noticing memory issues, possibly linked to benzos vs vascular dementia vs depression     Neck pain  -gradually worse  -following neurology Dr. Goldberg, pt does not prefer that doctor  -he recommended pain mgmt  -referred to pain mgmt at last visit  -hx of grandmal seizure and mini stroke     Hypothyroidism  -euthyrox 137     Hx of lymes dx     Swelling of legs and hands  -hospitalized with lasix     Mouth ulcers  -painful with spice  -using magic mouth wash which makes things worse    Health Maintenance:    Cervical Cancer Screen/PAP:  2019  Breast Cancer Screen/Mammogram: 22  HCV Screen:   Lab Results   Component Value Date    HEPCAB 0.08 2021    HEPCAB Negative 2021      DEXA:   No results found for this or any previous visit from the past 3650 days.     Colon Cancer Screenin    Smoking Status:   Tobacco Use      Smoking status: Every Day        Packs/day: 0.50        Years: 0.5 packs/day for 35.5 years (17.8 ttl pk-yrs)        Types: Cigarettes        Start date: 10/20/1988      Smokeless tobacco: Current   Smokes a pack a day   Lung Cancer Screening:  CT Low Dose  pulmonary nodules    Immunizations:  Flu vaccine- Recommended every fall  COVID vaccine primary series- complete  Tetanus- Tdap   Shingrix- series not completed  RSV-not recommended  Pneumovax 23-  N/A  Prevnar 20- at age 63        Mr#: [id number]      Past Medical History:   Diagnosis Date    ADHD (attention deficit hyperactivity disorder)     Over a year maybe    Anxiety     Breast cyst     Cerebrovascular disease 6 months ago?    Mini stroke    Depression     Emphysema lung (HCC)     1 year and a half it was mentioned by ER Doc.    Emphysema of lung (HCC)     Fibromyalgia

## 2024-05-27 DIAGNOSIS — E55.9 VITAMIN D DEFICIENCY: ICD-10-CM

## 2024-05-27 DIAGNOSIS — Z00.00 ANNUAL PHYSICAL EXAM: ICD-10-CM

## 2024-05-28 RX ORDER — ERGOCALCIFEROL 1.25 MG/1
50000 CAPSULE ORAL
Qty: 2 CAPSULE | Refills: 0 | Status: SHIPPED | OUTPATIENT
Start: 2024-05-28

## 2024-05-28 NOTE — TELEPHONE ENCOUNTER
Last visit: 5/8/24  Next visit:   Future Appointments   Date Time Provider Department Center   9/9/2024  2:00 PM Jacquelyn Lopez DO [facility]     Last filled: 10/4/23; vitamin D 50,000 units; qty 2 caps w/2 refills; every 14 days

## 2024-07-15 DIAGNOSIS — E55.9 VITAMIN D DEFICIENCY: ICD-10-CM

## 2024-07-15 DIAGNOSIS — Z00.00 ANNUAL PHYSICAL EXAM: ICD-10-CM

## 2024-07-15 RX ORDER — ERGOCALCIFEROL 1.25 MG/1
50000 CAPSULE ORAL
Qty: 12 CAPSULE | Refills: 1 | Status: SHIPPED | OUTPATIENT
Start: 2024-07-15

## 2024-07-15 NOTE — TELEPHONE ENCOUNTER
Requested Prescriptions     Pending Prescriptions Disp Refills    vitamin D (ERGOCALCIFEROL) 1.25 MG (92351 UT) CAPS capsule 2 capsule 0     Sig: Take 1 capsule by mouth every 14 days      Last seen: 5/8/24  Next seen: 9/9/24    Last filled: 5/28/24 Qty 2 w/0 refills

## 2024-08-27 ENCOUNTER — TELEPHONE (OUTPATIENT)
Dept: FAMILY MEDICINE CLINIC | Facility: CLINIC | Age: 54
End: 2024-08-27

## 2024-08-30 ENCOUNTER — LAB (OUTPATIENT)
Dept: FAMILY MEDICINE CLINIC | Facility: CLINIC | Age: 54
End: 2024-08-30

## 2024-08-30 ENCOUNTER — OFFICE VISIT (OUTPATIENT)
Dept: FAMILY MEDICINE CLINIC | Facility: CLINIC | Age: 54
End: 2024-08-30
Payer: MEDICAID

## 2024-08-30 VITALS
HEART RATE: 110 BPM | SYSTOLIC BLOOD PRESSURE: 137 MMHG | WEIGHT: 177 LBS | TEMPERATURE: 96.8 F | DIASTOLIC BLOOD PRESSURE: 88 MMHG | HEIGHT: 64 IN | BODY MASS INDEX: 30.22 KG/M2 | OXYGEN SATURATION: 95 %

## 2024-08-30 DIAGNOSIS — K14.0 GLOSSITIS: Primary | ICD-10-CM

## 2024-08-30 DIAGNOSIS — E03.9 ACQUIRED HYPOTHYROIDISM: ICD-10-CM

## 2024-08-30 DIAGNOSIS — R05.1 ACUTE COUGH: ICD-10-CM

## 2024-08-30 DIAGNOSIS — U07.1 COVID-19: ICD-10-CM

## 2024-08-30 DIAGNOSIS — K14.0 GLOSSITIS: ICD-10-CM

## 2024-08-30 LAB
EXP DATE SOLUTION: ABNORMAL
EXP DATE SWAB: ABNORMAL
EXPIRATION DATE: ABNORMAL
LOT NUMBER POC: ABNORMAL
LOT NUMBER SOLUTION: ABNORMAL
LOT NUMBER SWAB: ABNORMAL
SARS-COV-2 RNA, POC: POSITIVE

## 2024-08-30 PROCEDURE — 99214 OFFICE O/P EST MOD 30 MIN: CPT | Performed by: STUDENT IN AN ORGANIZED HEALTH CARE EDUCATION/TRAINING PROGRAM

## 2024-08-30 PROCEDURE — 87635 SARS-COV-2 COVID-19 AMP PRB: CPT | Performed by: STUDENT IN AN ORGANIZED HEALTH CARE EDUCATION/TRAINING PROGRAM

## 2024-08-30 SDOH — ECONOMIC STABILITY: FOOD INSECURITY: WITHIN THE PAST 12 MONTHS, THE FOOD YOU BOUGHT JUST DIDN'T LAST AND YOU DIDN'T HAVE MONEY TO GET MORE.: NEVER TRUE

## 2024-08-30 SDOH — ECONOMIC STABILITY: FOOD INSECURITY: WITHIN THE PAST 12 MONTHS, YOU WORRIED THAT YOUR FOOD WOULD RUN OUT BEFORE YOU GOT MONEY TO BUY MORE.: NEVER TRUE

## 2024-08-30 SDOH — ECONOMIC STABILITY: INCOME INSECURITY: HOW HARD IS IT FOR YOU TO PAY FOR THE VERY BASICS LIKE FOOD, HOUSING, MEDICAL CARE, AND HEATING?: NOT VERY HARD

## 2024-08-30 ASSESSMENT — ENCOUNTER SYMPTOMS
CHEST TIGHTNESS: 0
WHEEZING: 1
SHORTNESS OF BREATH: 0
COUGH: 1
SINUS PAIN: 0

## 2024-08-30 NOTE — PROGRESS NOTES
Claire Servin is a 53 y.o. female (: 1970) presenting to address:    Chief Complaint   Patient presents with    Follow-up     Mouth- Comes and Goes- Whelps- Tongue will swell up  Deep Congested Cough- At Cardiology- [place]       There were no vitals filed for this visit.    \"Have you been to the ER, urgent care clinic since your last visit?  Hospitalized since your last visit?\"    NO    “Have you seen or consulted any other health care providers outside of [facility] since your last visit?”    Yes, Cardiology and Neurology and Pain Management         “Have you had a pap smear?”    NO    Date of last Cervical Cancer screen (HPV or PAP): 2019

## 2024-08-30 NOTE — PROGRESS NOTES
[facility]    HISTORY OF PRESENT ILLNESS  Claire Servin  is a 53 y.o. y.o. female here for acute visit.    Burning sensation in her mouth x 1 month  -hx of ulcers  -describes welts, white spots and swelling tongue  -notices her tongue is much more red  -endorses dry mouth sensation, occasionally, drinks a lot of water    Hypothyroidism  -euthyrox 137    Depression/panic attack/PTSD  -xanax 1mg, ativan, adderall 15  -Dr. Lira  -gabapentin, amitriptline, duloxetine, seroquel  -noticing memory issues, possibly linked to benzos vs vascular dementia vs depression    COPD  -has had a worse cough x 1 week  -following pulmonologist Dr. Leonard  -taking albuterol prn  -taking advair every day  -endorses fatigue  -has never had covid previously      Mr#: [id number]      Past Medical History:   Diagnosis Date    ADHD (attention deficit hyperactivity disorder)     Over a year maybe    Anxiety     Breast cyst     Cerebrovascular disease 6 months ago?    Mini stroke    Depression     Emphysema lung (HCC)     1 year and a half it was mentioned by ER Doc.    Emphysema of lung (HCC)     Fibromyalgia     Hyperlipidemia     Hypothyroidism     Ovarian cyst     Renal mass        Past Surgical History:   Procedure Laterality Date    APPENDECTOMY      BREAST SURGERY      GYN      Cyst, Uterine Ablation    IMPLANT BREAST SILICONE/EQ Bilateral     Approximately 1999    TUBAL LIGATION         Family History   Problem Relation Age of Onset    Heart Disease Father     Alcohol Abuse Father     Coronary Art Dis Father     Early Death Father     Hearing Loss Father     High Blood Pressure Father     Breast Cancer Maternal Aunt     Cancer Paternal Aunt         breast    Heart Disease Mother     Dementia Mother     Coronary Art Dis Mother     Depression Mother     Early Death Mother     High Blood Pressure Mother     High Cholesterol Mother         MS    Mental Illness Mother     Obesity Mother     Stroke Mother     Heart  Gastroesophageal reflux disease    Vitamin D deficiency    Panic attack    PTSD (post-traumatic stress disorder)    Chronic fatigue    PECK (dyspnea on exertion)    Lymphocytic colitis    Hyperlipidemia    Obesity (BMI 30-39.9)    Current moderate episode of major depressive disorder (HCC)    BARBER (generalized anxiety disorder)    Nephrolithiasis         Current Outpatient Medications:     vitamin D (ERGOCALCIFEROL) 1.25 MG (47893 UT) CAPS capsule, Take 1 capsule by mouth every 14 days, Disp: 12 capsule, Rfl: 1    LORazepam (ATIVAN) 1 MG tablet, Take 2 tablets by mouth nightly., Disp: , Rfl:     tiZANidine (ZANAFLEX) 4 MG tablet, Take 1 tablet by mouth every 6 hours as needed, Disp: , Rfl:     QUEtiapine (SEROQUEL) 25 MG tablet, TAKE 1/2 TO 4 TABLETS (25MG) BY MOUTH AT BEDTIME AS NEEDED FOR SLEEP, Disp: , Rfl:     EUTHYROX 137 MCG tablet, TAKE 1 TABLET BY MOUTH IN THE MORNING FOR 90 DAYS, Disp: 90 tablet, Rfl: 1    atorvastatin (LIPITOR) 40 MG tablet, Take 1 tablet by mouth once daily, Disp: 90 tablet, Rfl: 1    ASPIRIN LOW DOSE 81 MG EC tablet, , Disp: , Rfl:     albuterol sulfate HFA (PROVENTIL;VENTOLIN;PROAIR) 108 (90 Base) MCG/ACT inhaler, Albuterol inhaler, 2 puffs up to every 4 hours if needed for wheezing, cough, shortness of breath, Disp: , Rfl:     ALPRAZolam (XANAX) 1 MG tablet, TAKE 2 & 1 2 (TWO & ONE HALF) TABLETS BY MOUTH ONCE DAILY AS NEEDED FOR ANXIETY, Disp: , Rfl:     amitriptyline (ELAVIL) 100 MG tablet, Take 2 tablets by mouth, Disp: , Rfl:     amphetamine-dextroamphetamine (ADDERALL XR) 15 MG extended release capsule, Take 1 capsule by mouth 2 times daily., Disp: , Rfl:     DULoxetine (CYMBALTA) 60 MG extended release capsule, Take 1 capsule by mouth daily, Disp: , Rfl:     famotidine (PEPCID) 40 MG tablet, Take 1 tablet by mouth once daily, Disp: , Rfl:     gabapentin (NEURONTIN) 300 MG capsule, Take 1 capsule by mouth 2 times daily., Disp: , Rfl:     nicotine (NICODERM CQ) 14 MG/24HR, Place 1

## 2024-09-02 LAB — VITAMIN B-12: 444 PG/ML (ref 211–911)

## 2024-09-05 LAB — THIAMINE BLOOD: 130 NMOL/L (ref 78–185)

## 2024-09-10 ENCOUNTER — PATIENT MESSAGE (OUTPATIENT)
Dept: FAMILY MEDICINE CLINIC | Facility: CLINIC | Age: 54
End: 2024-09-10

## 2024-09-10 DIAGNOSIS — E66.9 CLASS 1 OBESITY WITHOUT SERIOUS COMORBIDITY WITH BODY MASS INDEX (BMI) OF 30.0 TO 30.9 IN ADULT, UNSPECIFIED OBESITY TYPE: ICD-10-CM

## 2024-09-10 DIAGNOSIS — E03.9 ACQUIRED HYPOTHYROIDISM: Primary | ICD-10-CM

## 2024-11-20 ENCOUNTER — TELEPHONE (OUTPATIENT)
Dept: FAMILY MEDICINE CLINIC | Facility: CLINIC | Age: 54
End: 2024-11-20

## 2024-11-20 NOTE — TELEPHONE ENCOUNTER
Patient was resulted a TSH lab which stated her T3 low, T4 low but overall thyroid was normal.    She would like Dr. Lopez to order more labs for her whole endocrine system and specific thyroid labs.

## 2024-11-20 NOTE — TELEPHONE ENCOUNTER
Patient stated that she was referred to endo and the only thing she was tested for that Dr Lopez put down was for TSH. Patient was looking to be tested for more bong the TSH, T43 low T3 low and TSH normal

## 2024-11-29 RX ORDER — FLUTICASONE PROPIONATE AND SALMETEROL 50; 250 UG/1; UG/1
1 POWDER RESPIRATORY (INHALATION) 2 TIMES DAILY
COMMUNITY
Start: 2024-11-04

## 2024-12-02 ENCOUNTER — OFFICE VISIT (OUTPATIENT)
Dept: FAMILY MEDICINE CLINIC | Facility: CLINIC | Age: 54
End: 2024-12-02
Payer: MEDICAID

## 2024-12-02 VITALS
HEIGHT: 64 IN | HEART RATE: 95 BPM | WEIGHT: 181 LBS | RESPIRATION RATE: 16 BRPM | TEMPERATURE: 98.1 F | SYSTOLIC BLOOD PRESSURE: 138 MMHG | DIASTOLIC BLOOD PRESSURE: 84 MMHG | OXYGEN SATURATION: 100 % | BODY MASS INDEX: 30.9 KG/M2

## 2024-12-02 DIAGNOSIS — E78.5 HYPERLIPIDEMIA, UNSPECIFIED HYPERLIPIDEMIA TYPE: ICD-10-CM

## 2024-12-02 DIAGNOSIS — E03.9 ACQUIRED HYPOTHYROIDISM: ICD-10-CM

## 2024-12-02 DIAGNOSIS — F32.1 CURRENT MODERATE EPISODE OF MAJOR DEPRESSIVE DISORDER, UNSPECIFIED WHETHER RECURRENT (HCC): Primary | ICD-10-CM

## 2024-12-02 PROCEDURE — 99214 OFFICE O/P EST MOD 30 MIN: CPT | Performed by: STUDENT IN AN ORGANIZED HEALTH CARE EDUCATION/TRAINING PROGRAM

## 2024-12-02 RX ORDER — ATORVASTATIN CALCIUM 40 MG/1
40 TABLET, FILM COATED ORAL DAILY
Qty: 90 TABLET | Refills: 1 | Status: SHIPPED | OUTPATIENT
Start: 2024-12-02

## 2024-12-02 RX ORDER — EZETIMIBE 10 MG/1
10 TABLET ORAL DAILY
COMMUNITY
Start: 2024-11-29

## 2024-12-02 ASSESSMENT — ENCOUNTER SYMPTOMS
SHORTNESS OF BREATH: 0
SINUS PAIN: 0
CHEST TIGHTNESS: 0

## 2024-12-02 NOTE — PROGRESS NOTES
[facility]    HISTORY OF PRESENT ILLNESS  Claire Servin  is a 54 y.o. y.o. female here for FU.    Hypothyroidism  -was taking euthyrox 137, but dose changed because levels were slightly abnormal. Doesn't remember dose  -following endocrinology per pt's request  -feels she needs adrenal work up    Depression/panic attack/PTSD  -xanax 1mg, ativan, adderall 15  -gabapentin, amitriptline, duloxetine  -Dr. Lira psychiatry   -noticing memory issues, possibly linked to benzos vs vascular dementia vs depression  -following neurology for memory    Chest pain  -following cardiology Dr. Gen Fraziereztemibe 10, atorvastatin 40  -needs refill    Mr#: [id number]      Past Medical History:   Diagnosis Date    ADHD (attention deficit hyperactivity disorder)     Over a year maybe    Anxiety     Breast cyst     Cerebrovascular disease 6 months ago?    Mini stroke    Depression     Emphysema lung (HCC)     1 year and a half it was mentioned by ER Doc.    Emphysema of lung (HCC)     Fibromyalgia     Hyperlipidemia     Hypothyroidism     Ovarian cyst     Renal mass        Past Surgical History:   Procedure Laterality Date    APPENDECTOMY      BREAST SURGERY      GYN      Cyst, Uterine Ablation    IMPLANT BREAST SILICONE/EQ Bilateral     Approximately 1999    TUBAL LIGATION         Family History   Problem Relation Age of Onset    Heart Disease Father     Alcohol Abuse Father     Coronary Art Dis Father     Early Death Father     Hearing Loss Father     High Blood Pressure Father     Breast Cancer Maternal Aunt     Cancer Paternal Aunt         breast    Heart Disease Mother     Dementia Mother     Coronary Art Dis Mother     Depression Mother     Early Death Mother     High Blood Pressure Mother     High Cholesterol Mother         MS    Mental Illness Mother     Obesity Mother     Stroke Mother     Heart Disease Sister     Mult Sclerosis Sister     Arrhythmia Sister     Atrial Fibrillation Sister     Diabetes Sister

## 2024-12-02 NOTE — PROGRESS NOTES
Claire Servin is a 54 y.o. female (: 1970) presenting to address:    Chief Complaint   Patient presents with    Follow-up     Referral for Endocrinology- Thyroid- Full Panel  Memory is really Bad  Hands constantly look pruned- Circulation?         Vitals:    24 1454   BP: 138/84   Pulse: 95   Resp: 16   Temp: 98.1 °F (36.7 °C)   SpO2: 100%       \"Have you been to the ER, urgent care clinic since your last visit?  Hospitalized since your last visit?\"    NO    “Have you seen or consulted any other health care providers outside of [facility] since your last visit?”    Yes, Endocrinology, Neurology         “Have you had a pap smear?”    NO    Date of last Cervical Cancer screen (HPV or PAP): 2019